# Patient Record
Sex: MALE | Race: OTHER | Employment: FULL TIME | URBAN - METROPOLITAN AREA
[De-identification: names, ages, dates, MRNs, and addresses within clinical notes are randomized per-mention and may not be internally consistent; named-entity substitution may affect disease eponyms.]

---

## 2017-01-06 ENCOUNTER — ANESTHESIA EVENT (OUTPATIENT)
Dept: SURGERY | Age: 57
DRG: 406 | End: 2017-01-06
Payer: COMMERCIAL

## 2017-01-06 ENCOUNTER — ANESTHESIA (OUTPATIENT)
Dept: SURGERY | Age: 57
DRG: 406 | End: 2017-01-06
Payer: COMMERCIAL

## 2017-01-06 ENCOUNTER — SURGERY (OUTPATIENT)
Age: 57
End: 2017-01-06

## 2017-01-06 PROBLEM — R16.0 LIVER MASS: Status: ACTIVE | Noted: 2017-01-06

## 2017-01-06 PROCEDURE — 74011250636 HC RX REV CODE- 250/636: Performed by: SURGERY

## 2017-01-06 PROCEDURE — 77030020061 HC IV BLD WRMR ADMIN SET 3M -B: Performed by: ANESTHESIOLOGY

## 2017-01-06 PROCEDURE — 77030008684 HC TU ET CUF COVD -B: Performed by: ANESTHESIOLOGY

## 2017-01-06 PROCEDURE — 77030008771 HC TU NG SALEM SUMP -A: Performed by: ANESTHESIOLOGY

## 2017-01-06 PROCEDURE — 77030013079 HC BLNKT BAIR HGGR 3M -A: Performed by: ANESTHESIOLOGY

## 2017-01-06 PROCEDURE — 74011250636 HC RX REV CODE- 250/636

## 2017-01-06 PROCEDURE — 77030026438 HC STYL ET INTUB CARD -A: Performed by: ANESTHESIOLOGY

## 2017-01-06 PROCEDURE — P9045 ALBUMIN (HUMAN), 5%, 250 ML: HCPCS

## 2017-01-06 PROCEDURE — 74011000250 HC RX REV CODE- 250

## 2017-01-06 PROCEDURE — 77030019908 HC STETH ESOPH SIMS -A: Performed by: ANESTHESIOLOGY

## 2017-01-06 RX ORDER — SODIUM CHLORIDE 9 MG/ML
INJECTION, SOLUTION INTRAVENOUS
Status: DISCONTINUED | OUTPATIENT
Start: 2017-01-06 | End: 2017-01-06 | Stop reason: HOSPADM

## 2017-01-06 RX ORDER — PHENYLEPHRINE HCL IN 0.9% NACL 0.4MG/10ML
SYRINGE (ML) INTRAVENOUS AS NEEDED
Status: DISCONTINUED | OUTPATIENT
Start: 2017-01-06 | End: 2017-01-06 | Stop reason: HOSPADM

## 2017-01-06 RX ORDER — GLYCOPYRROLATE 0.2 MG/ML
INJECTION INTRAMUSCULAR; INTRAVENOUS AS NEEDED
Status: DISCONTINUED | OUTPATIENT
Start: 2017-01-06 | End: 2017-01-06 | Stop reason: HOSPADM

## 2017-01-06 RX ORDER — BUPIVACAINE HYDROCHLORIDE 2.5 MG/ML
INJECTION, SOLUTION EPIDURAL; INFILTRATION; INTRACAUDAL AS NEEDED
Status: DISCONTINUED | OUTPATIENT
Start: 2017-01-06 | End: 2017-01-06 | Stop reason: HOSPADM

## 2017-01-06 RX ORDER — ONDANSETRON 2 MG/ML
INJECTION INTRAMUSCULAR; INTRAVENOUS AS NEEDED
Status: DISCONTINUED | OUTPATIENT
Start: 2017-01-06 | End: 2017-01-06 | Stop reason: HOSPADM

## 2017-01-06 RX ORDER — ROCURONIUM BROMIDE 10 MG/ML
INJECTION, SOLUTION INTRAVENOUS AS NEEDED
Status: DISCONTINUED | OUTPATIENT
Start: 2017-01-06 | End: 2017-01-06 | Stop reason: HOSPADM

## 2017-01-06 RX ORDER — SODIUM CHLORIDE, SODIUM LACTATE, POTASSIUM CHLORIDE, CALCIUM CHLORIDE 600; 310; 30; 20 MG/100ML; MG/100ML; MG/100ML; MG/100ML
INJECTION, SOLUTION INTRAVENOUS
Status: DISCONTINUED | OUTPATIENT
Start: 2017-01-06 | End: 2017-01-06 | Stop reason: HOSPADM

## 2017-01-06 RX ORDER — MIDAZOLAM HYDROCHLORIDE 1 MG/ML
INJECTION, SOLUTION INTRAMUSCULAR; INTRAVENOUS AS NEEDED
Status: DISCONTINUED | OUTPATIENT
Start: 2017-01-06 | End: 2017-01-06 | Stop reason: HOSPADM

## 2017-01-06 RX ORDER — MIDAZOLAM HYDROCHLORIDE 1 MG/ML
INJECTION, SOLUTION INTRAMUSCULAR; INTRAVENOUS AS NEEDED
Status: DISCONTINUED | OUTPATIENT
Start: 2017-01-06 | End: 2017-01-06

## 2017-01-06 RX ORDER — PROPOFOL 10 MG/ML
INJECTION, EMULSION INTRAVENOUS AS NEEDED
Status: DISCONTINUED | OUTPATIENT
Start: 2017-01-06 | End: 2017-01-06 | Stop reason: HOSPADM

## 2017-01-06 RX ORDER — LIDOCAINE HYDROCHLORIDE 20 MG/ML
INJECTION, SOLUTION EPIDURAL; INFILTRATION; INTRACAUDAL; PERINEURAL AS NEEDED
Status: DISCONTINUED | OUTPATIENT
Start: 2017-01-06 | End: 2017-01-06 | Stop reason: HOSPADM

## 2017-01-06 RX ORDER — FENTANYL CITRATE 50 UG/ML
INJECTION, SOLUTION INTRAMUSCULAR; INTRAVENOUS AS NEEDED
Status: DISCONTINUED | OUTPATIENT
Start: 2017-01-06 | End: 2017-01-06 | Stop reason: HOSPADM

## 2017-01-06 RX ORDER — NEOSTIGMINE METHYLSULFATE 1 MG/ML
INJECTION INTRAVENOUS AS NEEDED
Status: DISCONTINUED | OUTPATIENT
Start: 2017-01-06 | End: 2017-01-06 | Stop reason: HOSPADM

## 2017-01-06 RX ORDER — ALBUMIN HUMAN 50 G/1000ML
SOLUTION INTRAVENOUS AS NEEDED
Status: DISCONTINUED | OUTPATIENT
Start: 2017-01-06 | End: 2017-01-06 | Stop reason: HOSPADM

## 2017-01-06 RX ADMIN — FENTANYL CITRATE 100 MCG: 50 INJECTION, SOLUTION INTRAMUSCULAR; INTRAVENOUS at 08:56

## 2017-01-06 RX ADMIN — ROCURONIUM BROMIDE 45 MG: 10 INJECTION, SOLUTION INTRAVENOUS at 08:57

## 2017-01-06 RX ADMIN — ALBUMIN HUMAN 250 ML: 50 SOLUTION INTRAVENOUS at 09:39

## 2017-01-06 RX ADMIN — THROMBIN, TOPICAL (BOVINE) 20000 UNITS: KIT at 10:00

## 2017-01-06 RX ADMIN — BUPIVACAINE HYDROCHLORIDE 1.5 ML: 2.5 INJECTION, SOLUTION EPIDURAL; INFILTRATION; INTRACAUDAL at 10:33

## 2017-01-06 RX ADMIN — FENTANYL CITRATE 50 MCG: 50 INJECTION, SOLUTION INTRAMUSCULAR; INTRAVENOUS at 08:50

## 2017-01-06 RX ADMIN — MIDAZOLAM HYDROCHLORIDE 2 MG: 1 INJECTION, SOLUTION INTRAMUSCULAR; INTRAVENOUS at 08:50

## 2017-01-06 RX ADMIN — LIDOCAINE HYDROCHLORIDE 60 MG: 20 INJECTION, SOLUTION EPIDURAL; INFILTRATION; INTRACAUDAL; PERINEURAL at 08:56

## 2017-01-06 RX ADMIN — BUPIVACAINE HYDROCHLORIDE 2 ML: 2.5 INJECTION, SOLUTION EPIDURAL; INFILTRATION; INTRACAUDAL at 10:20

## 2017-01-06 RX ADMIN — SODIUM CHLORIDE, SODIUM LACTATE, POTASSIUM CHLORIDE, CALCIUM CHLORIDE: 600; 310; 30; 20 INJECTION, SOLUTION INTRAVENOUS at 08:47

## 2017-01-06 RX ADMIN — ONDANSETRON 4 MG: 2 INJECTION INTRAMUSCULAR; INTRAVENOUS at 10:15

## 2017-01-06 RX ADMIN — Medication 80 MCG: at 09:57

## 2017-01-06 RX ADMIN — ROCURONIUM BROMIDE 10 MG: 10 INJECTION, SOLUTION INTRAVENOUS at 09:50

## 2017-01-06 RX ADMIN — GELATIN ABSORBABLE SPONGE SIZE 100 1 EACH: MISC at 09:58

## 2017-01-06 RX ADMIN — CEFAZOLIN 2 G: 1 INJECTION, POWDER, FOR SOLUTION INTRAMUSCULAR; INTRAVENOUS; PARENTERAL at 09:11

## 2017-01-06 RX ADMIN — SODIUM CHLORIDE: 9 INJECTION, SOLUTION INTRAVENOUS at 09:18

## 2017-01-06 RX ADMIN — PROPOFOL 150 MG: 10 INJECTION, EMULSION INTRAVENOUS at 08:56

## 2017-01-06 RX ADMIN — ROCURONIUM BROMIDE 5 MG: 10 INJECTION, SOLUTION INTRAVENOUS at 08:56

## 2017-01-06 RX ADMIN — PROPOFOL 50 MG: 10 INJECTION, EMULSION INTRAVENOUS at 10:23

## 2017-01-06 RX ADMIN — NEOSTIGMINE METHYLSULFATE 4 MG: 1 INJECTION INTRAVENOUS at 10:30

## 2017-01-06 RX ADMIN — BUPIVACAINE HYDROCHLORIDE 2.5 ML: 2.5 INJECTION, SOLUTION EPIDURAL; INFILTRATION; INTRACAUDAL at 09:02

## 2017-01-06 RX ADMIN — GLYCOPYRROLATE 0.7 MG: 0.2 INJECTION INTRAMUSCULAR; INTRAVENOUS at 10:30

## 2017-01-06 NOTE — ANESTHESIA PROCEDURE NOTES
Epidural Block    Start time: 1/6/2017 8:37 AM  End time: 1/6/2017 8:48 AM  Performed by: BRAEDEN Bella  Authorized by: BRAEDEN Bella     Pre-Procedure  Indication: at surgeon's request, post-op pain management and procedure for pain    Preanesthetic Checklist: patient identified, risks and benefits discussed, anesthesia consent, site marked, patient being monitored, timeout performed and anesthesia consent    Timeout Time: 08:37        Epidural:   Patient position:  Left lateral decubitus  Prep region:  Lumbar  Prep: Chlorhexidine    Location:  T9-10    Needle and Epidural Catheter:   Needle Type:  Tuohy  Needle Gauge:  17 G  Injection Technique:  Loss of resistance using air  Attempts:  1  Catheter Size:  19 G  Events: no blood with aspiration, no cerebrospinal fluid with aspiration, no paresthesia and negative aspiration test    Test Dose:  Bupivacaine 0.25% and negative    Assessment:   Catheter Secured:  Tegaderm and tape  Insertion:  Uncomplicated  Patient tolerance:  Patient tolerated the procedure well with no immediate complications

## 2017-01-06 NOTE — ANESTHESIA PREPROCEDURE EVALUATION
Anesthetic History   No history of anesthetic complications            Review of Systems / Medical History  Patient summary reviewed, nursing notes reviewed and pertinent labs reviewed    Pulmonary  Within defined limits                 Neuro/Psych   Within defined limits           Cardiovascular  Within defined limits  Hypertension                   GI/Hepatic/Renal  Within defined limits         Liver disease     Endo/Other  Within defined limits      Arthritis     Other Findings              Physical Exam    Airway  Mallampati: II  TM Distance: > 6 cm  Neck ROM: normal range of motion   Mouth opening: Normal     Cardiovascular  Regular rate and rhythm,  S1 and S2 normal,  no murmur, click, rub, or gallop             Dental  No notable dental hx       Pulmonary  Breath sounds clear to auscultation               Abdominal  GI exam deferred       Other Findings            Anesthetic Plan    ASA: 2  Anesthesia type: general      Post-op pain plan if not by surgeon: indwelling epidural catheter    Induction: Intravenous  Anesthetic plan and risks discussed with: Patient

## 2017-01-10 NOTE — ANESTHESIA POSTPROCEDURE EVALUATION
Post-Anesthesia Evaluation and Assessment    Patient: Corrie Snider MRN: [id number]  SSN: [id number]    YOB: 1960  Age: 61 y.o. Sex: male       Cardiovascular Function/Vital Signs  Visit Vitals    /75 (BP 1 Location: Right arm, BP Patient Position: At rest)    Pulse 60    Temp 37.1 °C (98.7 °F)    Resp 18    Ht 5' 7\" (1.702 m)    Wt 64 kg (141 lb 1.5 oz)    SpO2 95%    BMI 22.1 kg/m2       Patient is status post general, epidural anesthesia for Procedure(s):  PARTIAL RIGHT HEPATIC LIVER LOBECTOMY. Nausea/Vomiting: None    Postoperative hydration reviewed and adequate. Pain:  Pain Scale 1: Numeric (0 - 10) (01/09/17 1154)  Pain Intensity 1: 0 (01/09/17 1154)   Managed    Neurological Status:   Neuro (WDL): Within Defined Limits (01/06/17 1120)   At baseline    Mental Status and Level of Consciousness: Arousable    Pulmonary Status:   O2 Device: Room air (01/09/17 1154)   Adequate oxygenation and airway patent    Complications related to anesthesia: None    Post-anesthesia assessment completed.  No concerns    Signed By: Tram Shaikh MD     January 10, 2017

## 2017-01-12 PROBLEM — C22.0 HEPATOMA (HCC): Status: ACTIVE | Noted: 2017-01-06

## 2017-01-16 ENCOUNTER — OFFICE VISIT (OUTPATIENT)
Dept: SURGERY | Age: 57
End: 2017-01-16

## 2017-01-16 VITALS
TEMPERATURE: 98.8 F | SYSTOLIC BLOOD PRESSURE: 138 MMHG | WEIGHT: 140 LBS | BODY MASS INDEX: 21.97 KG/M2 | HEIGHT: 67 IN | DIASTOLIC BLOOD PRESSURE: 78 MMHG | RESPIRATION RATE: 16 BRPM | HEART RATE: 58 BPM | OXYGEN SATURATION: 97 %

## 2017-01-16 DIAGNOSIS — Z09 POSTOPERATIVE EXAMINATION: Primary | ICD-10-CM

## 2017-01-16 NOTE — MR AVS SNAPSHOT
Visit Information Date & Time Provider Department Dept. Phone Encounter #  
 1/16/2017  1:50 PM [place]row Nikhil, [address] [facility] [phone] [id number] Upcoming Health Maintenance Date Due Pneumococcal 19-64 Highest Risk (1 of 3 - PCV13) 1/2/1978 DTaP/Tdap/Td series (1 - Tdap) 1/2/1980 FOBT Q 1 YEAR AGE 50-75 1/2/2009 INFLUENZA AGE 9 TO ADULT 8/1/2016 Allergies as of 1/16/2017  Review Complete On: 1/16/2017 By: Venancio Tejeda MD  
  
 Severity Noted Reaction Type Reactions Venom-honey Bee High 04/08/2014    Anaphylaxis Current Immunizations  Reviewed on 1/9/2017 No immunizations on file. Not reviewed this visit You Were Diagnosed With   
  
 Codes Comments Postoperative examination    -  Primary ICD-10-CM: F03 ICD-9-CM: V67.00 Vitals BP Pulse Temp Resp Height(growth percentile) Weight(growth percentile) 138/78 (BP 1 Location: Right arm, BP Patient Position: Sitting) (!) 58 98.8 °F (37.1 °C) (Oral) 16 5' 7\" (1.702 m) 140 lb (63.5 kg) SpO2 BMI Smoking Status 97% 21.93 kg/m2 Current Every Day Smoker BMI and BSA Data Body Mass Index Body Surface Area  
 21.93 kg/m 2 1.73 m 2 Preferred Pharmacy Pharmacy Name Phone [facility] [address] [place] [address], [address] [place] [address] Your Updated Medication List  
  
   
This list is accurate as of: 1/16/17  3:35 PM.  Always use your most recent med list.  
  
  
  
  
 oxyCODONE-acetaminophen 5-325 mg per tablet Commonly known as:  PERCOCET Take 1-2 Tabs by mouth every four (4) hours as needed. Max Daily Amount: 12 Tabs. polyethylene glycol 17 gram packet Commonly known as:  Beola [place]fford Take 1 Packet by mouth daily as needed. For constipation  
  
 tenofovir 300 mg tablet Commonly known as:  Shabbir Vaughan Take 1 Tab by mouth daily. Introducing [facility] HEALTH SERVICES! [facility] introduces avocadostore patient portal. Now you can access parts of your medical record, email your doctor's office, and request medication refills online. 1. In your internet browser, go to [email or web address] 2. Click on the First Time User? Click Here link in the Sign In box. You will see the New Member Sign Up page. 3. Enter your avocadostore Access Code exactly as it appears below. You will not need to use this code after youve completed the sign-up process. If you do not sign up before the expiration date, you must request a new code. · avocadostore Access Code: [email or web address] Expires: 1/23/2017 12:53 PM 
 
4. Enter the last four digits of your Social Security Number (xxxx) and Date of Birth (mm/dd/yyyy) as indicated and click Submit. You will be taken to the next sign-up page. 5. Create a avocadostore ID. This will be your avocadostore login ID and cannot be changed, so think of one that is secure and easy to remember. 6. Create a avoc[employer]ostore password. You can change your password at any time. 7. Enter your Password Reset Question and Answer. This can be used at a later time if you forget your password. 8. Enter your e-mail address. You will receive e-mail notification when new information is available in [address] 9. Click Sign Up. You can now view and download portions of your medical record. 10. Click the Download Summary menu link to download a portable copy of your medical information. If you have questions, please visit the Frequently Asked Questions section of the avocadostore website. Remember, avocadostore is NOT to be used for urgent needs. For medical emergencies, dial 911. Now available from your iPhone and Android! Please provide this summary of care documentation to your next provider. Your primary care clinician is listed as Latesha Sy. If you have any questions after today's visit, please call [phone].

## 2017-01-16 NOTE — PROGRESS NOTES
Surgery History and Physical    Subjective:      Jose Chen is a 61 y.o.  male who presents for postop care following a partial right hepatic lobectomy on 1/6/17. His pathology report revealed hepatocellular carcinoma, surgical margins are free of tumor, and adjacent liver parenchyma with chronic hepatitis, with bridging fibrosis,(Grade 2, Stage 3), consistent with Hepatitis C infection. He reports doing extremely well and notes walking 5 miles the day after surgery. However, he does mention his right outer thigh feels \"half numb\" and an electric sensation. Chief Complaint   Patient presents with    Surgical Follow-up     1-6-17 partial right hepatectomy       Patient Active Problem List    Diagnosis Date Noted    Hepatoma [facility]) 01/06/2017    Pulmonary nodule 06/10/2016    Chronic hepatitis B ([facility] [address] 04/22/2016    Liver mass, right lobe 04/22/2016     Past Medical History   Diagnosis Date    Arthritis     Hepatitis B carrier     Hepatoma ([facility] [address] 1/6/2017    Hypertension      PT DENIES    Liver disease      HEPATITIS B    Liver mass, right lobe 4/22/2016    Pulmonary nodule 06/2016     right middle lobe, recheck 11/16 stable at 2 mm, recheck again in 6 mo    Ulnar nerve compression      right      Past Surgical History   Procedure Laterality Date    Hx prostatectomy  1979    Hx heent  1997     JAW SURGERY-A. [place] Hx orthopaedic Right 2014     ELBOW    Hx other surgical  01/06/2017     partial right [id number]. Limmie Pears      Social History   Substance Use Topics    Smoking status: Current Every Day Smoker     Packs/day: 0.50     Years: 20.00     Types: Cigarettes    Smokeless tobacco: Never Used    Alcohol use No      Comment: quit 11/2016      Family History   Problem Relation Age of Onset    Anesth Problems Neg Hx       Prior to Admission medications    Medication Sig Start Date End Date Taking?  Authorizing Provider   tenofovir (VIREAD) 300 mg tablet Take 1 Tab by mouth daily. 6/28/16  Yes KEY Rudolph   oxyCODONE-acetaminophen (PERCOCET) 5-325 mg per tablet Take 1-2 Tabs by mouth every four (4) hours as needed. Max Daily Amount: 12 Tabs. 1/9/17   Dinorah Vivas NP   polyethylene glycol (MIRALAX) 17 gram packet Take 1 Packet by mouth daily as needed. For constipation 1/9/17   Dinorah Vivas NP     Allergies   Allergen Reactions    Venom-Honey Bee Anaphylaxis         Review of Systems:    A comprehensive review of systems was negative except for that written in the History of Present Illness. Objective:     Visit Vitals    /78 (BP 1 Location: Right arm, BP Patient Position: Sitting)    Pulse (!) 58    Temp 98.8 °F (37.1 °C) (Oral)    Resp 16    Ht 5' 7\" (1.702 m)    Wt 140 lb (63.5 kg)    SpO2 97%    BMI 21.93 kg/m2       Physical Exam:  General appearance: alert, cooperative, no distress, appears stated age  Head: Normocephalic, without obvious abnormality, atraumatic  Abdomen: soft, non-tender. Bowel sounds normal. No masses,  no organomegaly, incision healed well with no evidence of infection   Neurologic: Grossly normal      Assessment:       ICD-10-CM ICD-9-CM    1. Postoperative examination Z09 V67.00        Plan:     Mr. Altagracia Pedraza will be returning back to work tomorrow with the condition of light duty for a week. Follow up prn and call for questions. Will also see Dr. Michelle Acevedo    Written by landon Christian, as dictated by [facility]. Kishan Snowden MD.    Total face to face time with patient: 10 minutes. Greater than 50% of the time was spent in counseling. Signed By: Evangelina Snowden MD    January 16, 2017

## 2017-01-16 NOTE — PROGRESS NOTES
1. Have you been to the ER, urgent care clinic since your last visit? Hospitalized since your last visit? No    2. Have you seen or consulted any other health care providers outside of the [address] [place] [address] since your last visit? Include any pap smears or colon screening.  No

## 2017-02-03 ENCOUNTER — TELEPHONE (OUTPATIENT)
Dept: SURGERY | Age: 57
End: 2017-02-03

## 2017-02-03 NOTE — TELEPHONE ENCOUNTER
Patient called back and said he was still having the pain that he had at his last appt. No better, no worse. Appt.  Has been made for eval.

## 2017-02-03 NOTE — TELEPHONE ENCOUNTER
Patient is in a lot of pain and wants to know if this is normal or does he need to come in to see the doctor. I already scheduled him an appointment.

## 2017-02-10 ENCOUNTER — OFFICE VISIT (OUTPATIENT)
Dept: HEMATOLOGY | Age: 57
End: 2017-02-10

## 2017-02-10 VITALS
OXYGEN SATURATION: 97 % | HEART RATE: 58 BPM | RESPIRATION RATE: 18 BRPM | TEMPERATURE: 98.4 F | WEIGHT: 143.4 LBS | DIASTOLIC BLOOD PRESSURE: 75 MMHG | SYSTOLIC BLOOD PRESSURE: 143 MMHG | BODY MASS INDEX: 22.51 KG/M2 | HEIGHT: 67 IN

## 2017-02-10 DIAGNOSIS — B18.1 CHRONIC HEPATITIS B (HCC): Primary | ICD-10-CM

## 2017-02-10 RX ORDER — OXYCODONE AND ACETAMINOPHEN 5; 325 MG/1; MG/1
1-2 TABLET ORAL
Qty: 40 TAB | Refills: 0 | Status: SHIPPED | OUTPATIENT
Start: 2017-02-10 | End: 2018-03-21

## 2017-02-10 RX ORDER — TENOFOVIR DISOPROXIL FUMARATE 300 MG/1
300 TABLET, FILM COATED ORAL DAILY
Qty: 28 TAB | Refills: 11 | Status: SHIPPED | OUTPATIENT
Start: 2017-02-10 | End: 2018-01-29 | Stop reason: SDUPTHER

## 2017-02-10 NOTE — PROGRESS NOTES
93 Donna Everett MD, [address], [facility]     April Johann Arenas, MORALES Diego MD, [address], MD Evangelina Purcell NP Constancia Gravely, NP        [address] [facility], [address] [address]  [address].     [phone]     FAX: [address] [place] [address], [address] [place] [address], [address]     [phone]     FAX: [phone]     Patient Care Team:  Damaris Wagner MD as PCP - General (Family Practice)  Samira Haney MD (Cardiology)  Tunde Abbott MD (General Surgery)    Patient Active Problem List   Diagnosis Code    Chronic hepatitis B ([facility] [address] B18.1    Liver mass, right lobe R16.0    Pulmonary nodule R91.1    Hepatocellular carcinoma ([address] C22.0       Camden Mcdonald returns [facility] the [facility] for management of [address] due to chronic HBV. The active problem list, all pertinent past medical history, medications, radiologic findings and laboratory findings related to the liver disorder were reviewed with the patient. The patient is a 61 y.o.  male from [place] who was first noted to have abnormalities in liver transaminases in 3/2016. Serologic studies demonstrated he was positive for HBV and ASMA. His ALT and HBV DNA were elevated and Viread 300 mg daily was started (June 2016). He has tolerated this medication well with no new physical complaints and has shown significant suppression of HBV at last office visit. Ultrasound of the liver was performed in 3/2016. The results of the imaging demonstrated a normal appearing liver. There was a 1.8 cm lesion in the right lobe. CT scan of the liver was performed in 5/2016. This demonstrated the liver lesion was a hemangioma. A new pulmonary 2 cm nodule was observed.   Patient had routine interval US of liver in 11/2016 which demonstrated interval increase of size of lesion. CT scan of 11/2016 confirmed increase in size and characteristics of enhancement and washout concerning for malignancy. Given the patient has no underlying cirrhosis, resection of this lesion was thought to be best approach. He underwent resection of the segment 8 lesion in early 1/2017 and tolerated this surgery well. He was up and resuming normal activities within a week. Pathology confirmed moderately differentiated [address] with clear margins in the setting of bridging fibrosis. Patient has had some increased \"pulling sensation\" and pain inferior to the incision site, especially with deep breath or coughing. He has had to continue with pain medication on an intermittent basis at the end of a work day to manage pain in order to sleep. No associated fever, radiation of pain or association with bowel movement. A liver biopsy has not been performed. He has had past Fibroscan. The result was 11.8 kPa which correlates with fibrosis stage 3 out of 4. This suggests that the patient has bridging fibrosis. The patient has no symptoms which could be attributed to the liver disorder. The patient completes all daily activities without any functional limitations. The patient has not experienced fatigue, pain in the right side over the liver, problems concentrating, swelling of the abdomen, swelling of the lower extremities, hematemesis, hematochezia. ALLERGIES  Allergies   Allergen Reactions    Venom-Honey Bee Anaphylaxis       MEDICATIONS  Current Outpatient Prescriptions   Medication Sig    tenofovir (VIREAD) 300 mg tablet Take 1 Tab by mouth daily.  oxyCODONE-acetaminophen (PERCOCET) 5-325 mg per tablet Take 1-2 Tabs by mouth every four (4) hours as needed. Max Daily Amount: 12 Tabs.  polyethylene glycol (MIRALAX) 17 gram packet Take 1 Packet by mouth daily as needed. For constipation     No current facility-administered medications for this visit.         SYSTEM REVIEW NOT RELATED TO LIVER DISEASE OR REVIEWED ABOVE:  Constitution systems: Negative for fever, chills, weight gain, weight loss. Eyes: Negative for visual changes. ENT: Negative for sore throat, painful swallowing. Respiratory: Negative for cough, hemoptysis, SOB. Cardiology: Negative for chest pain, palpitations. GI:  Negative for constipation or diarrhea. Some sharp/pulling pain inferior to incision, persists ~1 month post-op. : Negative for urinary frequency, dysuria, hematuria, nocturia. Skin: Negative for rash. Diminished sensitivity of the skin overlying the right anterior thigh since surgery. Hematology: Negative for easy bruising, blood clots. Musculo-skeletal: Negative for back pain, muscle pain, weakness. Neurologic: Negative for headaches, dizziness, vertigo, memory problems not related to HE. Psychology: Negative for anxiety, depression. FAMILY HISTORY:  The father  of lung cancer. The patient has no knowledge of the mother's medical condition. There is no family history of liver disease. SOCIAL HISTORY:  The patient is . The patient has 5 children, and 3 grandchildren. The patient currently smokes half pack of tobacco daily. The patient has previously consumed alcohol in excess. He now consumes 1-2 alcoholic beverages daily and up to 4 on each weekend day. This represents a significant reduction for him. The patient currently works full time as a . PHYSICAL EXAMINATION:  Visit Vitals    /75 (BP 1 Location: Left arm, BP Patient Position: Sitting)    Pulse (!) 58    Temp 98.4 °F (36.9 °C) (Oral)    Resp 18    Ht 5' 7\" (1.702 m)    Wt 143 lb 6.4 oz (65 kg)    SpO2 97%    BMI 22.46 kg/m2     General: No acute distress. Eyes: Sclera anicteric. ENT: No oral lesions. Thyroid normal.  Nodes: No adenopathy. Skin: No spider angiomata. No jaundice. No palmar erythema. Respiratory: Lungs clear to auscultation.    Cardiovascular: Regular heart rate. No murmurs. No JVD. Abdomen: 9-10 cm RUQ incisional scar, well-approximated without keloid or hernia. No surrounding erythema. Tender with palpation along the inferior border of the liver, which is prominent. Small, reducible umbilical hernia. Spleen not palpable. No obvious ascites. Extremities: No edema. No muscle wasting. No gross arthritic changes. Neurologic: Alert and oriented. Cranial nerves grossly intact. No asterixis.     LABORATORY STUDIES:  [address] & Units 1/8/2017 1/7/2017 1/6/2017   WBC 4.1 - 11.1 K/uL 10.0 12.8 (H) 7.6   ANC 1.8 - 8.0 K/UL  10.4 (H) 5.5   HGB 12.1 - 17.0 g/dL 12.2 12.8 12.1    - 400 K/uL 107 (L) 117 (L) 124 (L)   INR 0.8 - 1.2      AST 15 - 37 U/L 188 (H) 282 (H)    ALT 12 - 78 U/L 130 (H) 126 (H)    Alk Phos 45 - 117 U/L 68 63    Bili, Total 0.2 - 1.0 MG/DL 0.9 1.1 (H)    Bili, Direct 0.00 - 0.40 mg/dL      Albumin 3.5 - 5.0 g/dL 3.0 (L) 2.9 (L)    BUN 6 - 20 MG/DL 9 9    Creat 0.70 - 1.30 MG/DL 0.93 0.86    Na 136 - 145 mmol/L 137 138    K 3.5 - 5.1 mmol/L 3.9 3.8    Cl 97 - 108 mmol/L 102 104    CO2 21 - 32 mmol/L 27 23    Glucose 65 - 100 mg/dL 105 (H) 140 (H)    Magnesium 1.6 - 2.4 mg/dL 2.0       [facility] Latest Ref Rng 10/25/2016 6/10/2016 4/22/2016   WBC 3.4 - 10.8 x10E3/uL   6.9   ANC 1.4 - 7.0 x10E3/uL   4.2   HGB 12.6 - 17.7 g/dL   15.6    - 379 x10E3/uL   190   INR 0.8 - 1.2   1.1   AST 0 - 40 IU/L 54 (H) 47 (H) 192 (H)   ALT 0 - 44 IU/L 35 30 196 (H)   Alk Phos 39 - 117 IU/L 65 91 119 (H)   Bili, Total 0.0 - 1.2 mg/dL 0.4 0.4 1.2   Bili, Direct 0.00 - 0.40 mg/dL 0.18 0.20 0.53 (H)   Albumin 3.5 - 5.5 g/dL 4.5 4.3 3.9   BUN 6 - 24 mg/dL  13 14   Creat 0.76 - 1.27 mg/dL  0.84 0.72 (L)   Na 134 - 144 mmol/L  144 142   K 3.5 - 5.2 mmol/L  4.9 4.5   Cl 97 - 108 mmol/L  106 104   CO2 18 - 29 mmol/L  23 25   Glucose 65 - 99 mg/dL  103 (H) 93   Virology Latest Ref Rng 10/25/2016 6/10/2016    HBV DNA IU/mL 60 52510    Additional lab values drawn at today's office visit are pending at the time of documentation. SEROLOGIES:  Serologies Latest Ref Rng 6/10/2016 4/22/2016   Hep A Ab, Total Negative  Positive (A)   Hep B Surface Ag Negative  Positive (A)   Hep B Core Ab, Total Negative  Positive (A)   Hep B Surface AB QL   Non Reactive   Hep C Ab 0.0 - 0.9 s/co ratio  0.1   Hep D AB Not Detected Not Detected    Ferritin 30 - 400 ng/mL  359   Iron % Saturation 15 - 55 %  76 (HH)   AKIN, IFA   Negative   ASMCA 0 - 19 Units  83 (H)   Alpha-1 antitrypsin level 90 - 200 mg/dL  176   6/2016. HIV Ab is negative. HBeAg is negative, HBeAb is positive. LIVER HISTOLOGY:  6/2016. FibroScan performed at [address] EkPa was 11.8. IQR/med 26%. The results suggested a fibrosis level of F3.  1/2017. Liver resection. 2.2 cm HCC, moderately differentiated with free margins. Adjacent liver shows chronic hepatitis with bridging fibrosis (grade 2, stage 3)    ENDOSCOPIC PROCEDURES:  Not available or performed    RADIOLOGY:  3/2016. Ultrasound of liver. Normal appearing liver. Single hyperecholic mass in the right lobe measuring 1.5 cm. Consistent with hemangioma. 5/2016. CT scan abdomen with and without IV contrast. 1.9 cm hemangioma in the right hepatic lobe. No additional liver lesion. 11/2016. Ultrasound of liver. Increase in size of the right lobe hepatic mass. CT scan or MRI recommended. 11/2016. CT scan abdomen. Interval enlargement of hypervascular segment 8 liverlesion now 2.5 x 1.8 x 3.1 cm. In the setting of hepatitis B, despite lack of CT evidence of cirrhosis, hepatocellular carcinoma is more likely than an unusual presentation of a benign lesion such as atypical hemangioma. OTHER TESTING:  Not available or performed    ASSESSMENT AND PLAN:  Chronic HBV with bridging fibrosis.      Liver transaminases are elevated recently due to resection, these had nearly normalized pre-operatively. Liver function is normal. The platelet count is normal.  Based upon laboratory studies and imaging the patient does not appear to have cirrhosis - recent surgical liver biopsy demonstrates bridging fibosis. Will monitor these values, tumor marker and HBV levels in response to recent surgical resection of the liver. HBV treatment. Currently on Viread 300 mg daily with excellent tolerance. He is responding well to this treatment with a significant decline is his HBV DNA to almost undetected levels at last assessment in 10/2016. He will continue this long term unless patient shows future evidence of HBV Surface Ag loss and gain of surface antibody    The patient has been shown to have a small 2mm pulmonary nodule on the CT scan in 11/2016. Will continue to monitor, this may require dedicated study if increase in size or change in characteristics. [facility] [address] Patient underwent successful resection of the 2.2 cm tumor of segment 8 of the liver ~5 weeks ago. He has done well post-operatively, but continues to have some pain with cough and deep inspiration inferior to surgical margin. Suspect this is adhesional pain, but will obtain CT scan to assess for other source. Patient would ordinarily have this done at the 3 month post-surgical time point, but I will obtain within the next several weeks given his symptoms. I have renewed patient's pain medication as well. The patient was directed to continue all current medications at the current dosages. There are no contraindications for the patient to take any medications that are necessary for treatment of other medical issues. The patient was advised to remain abstinent from alcohol as noted above. Vaccination for viral hepatitis A is not needed. The patient has serologic evidence of prior exposure or vaccination with immunity. All of the above issues were discussed with the patient. All questions were answered.   The patient expressed a clear understanding of the above. Follow-up [address] in 2 months following additional imaging studies.       Ana Ortiz PA-C  [facility] [place] [facility] [address] [place] [address], [address] [address]  [address].  [phone]

## 2017-02-10 NOTE — MR AVS SNAPSHOT
Visit Information Date & Time Provider Department Dept. Phone Encounter #  
 2/10/2017  8:30 AM [facility] [address] [place] [address] [id number] Follow-up Instructions Return in about 2 months (around 4/10/2017). Upcoming Health Maintenance Date Due Pneumococcal 19-64 Highest Risk (1 of 3 - PCV13) 1/2/1978 DTaP/Tdap/Td series (1 - Tdap) 1/2/1980 FOBT Q 1 YEAR AGE 50-75 1/2/2009 INFLUENZA AGE 9 TO ADULT 8/1/2016 Allergies as of 2/10/2017  Review Complete On: 2/10/2017 By: Cem Razo Severity Noted Reaction Type Reactions Venom-honey Bee High 04/08/2014    Anaphylaxis Current Immunizations  Reviewed on 1/9/2017 No immunizations on file. Not reviewed this visit You Were Diagnosed With   
  
 Codes Comments Chronic hepatitis B ([facility]    -  Primary ICD-10-CM: B18.1 ICD-9-CM: 070.32 Vitals BP Pulse Temp Resp Height(growth percentile) Weight(growth percentile) 143/75 (BP 1 Location: Left arm, BP Patient Position: Sitting) (!) 58 98.4 °F (36.9 °C) (Oral) 18 5' 7\" (1.702 m) 143 lb 6.4 oz (65 kg) SpO2 BMI Smoking Status 97% 22.46 kg/m2 Current Every Day Smoker BMI and BSA Data Body Mass Index Body Surface Area  
 22.46 kg/m 2 1.75 m 2 Preferred Pharmacy Pharmacy Name Phone [facility] [address] [place] [address] Your Updated Medication List  
  
   
This list is accurate as of: 2/10/17  8:58 AM.  Always use your most recent med list.  
  
  
  
  
 oxyCODONE-acetaminophen 5-325 mg per tablet Commonly known as:  PERCOCET Take 1-2 Tabs by mouth every four (4) hours as needed. Max Daily Amount: 12 Tabs. polyethylene glycol 17 gram packet Commonly known as:  Ring[place] Armor Take 1 Packet by mouth daily as needed. For constipation  
  
 tenofovir 300 mg tablet Commonly known as:  Vonna Papa Take 1 Tab by mouth daily. Prescriptions Printed Refills  
 oxyCODONE-acetaminophen (PERCOCET) 5-325 mg per tablet 0 Sig: Take 1-2 Tabs by mouth every four (4) hours as needed. Max Daily Amount: 12 Tabs. Class: Print Route: Oral  
  
Prescriptions Sent to Pharmacy Refills  
 tenofovir (VIREAD) 300 mg tablet 11 Sig: Take 1 Tab by mouth daily. Class: Normal  
 Pharmacy: [facility] [address] [place] [address] [address]  #: [phone] Route: Oral  
  
We Performed the Following AFP WITH AFP-L3% [[id number] Custom] CBC W/O DIFF [47229 CPT(R)] HEPATIC FUNCTION PANEL (6) [[id number] Custom] HEPATITIS B, QT, PCR [93769 CPT(R)] METABOLIC PANEL, BASIC [67443 CPT(R)] Follow-up Instructions Return in about 2 months (around 4/10/2017). To-Do List   
 03/06/2017 Imaging:  CT ABD W WO CONT Referral Information Referral ID Referred By Referred To  
  
 [id number] Erich Maciel B Not Available Visits Status Start Date End Date 1 New Request 2/10/17 2/10/18 If your referral has a status of pending review or denied, additional information will be sent to support the outcome of this decision. Introducing \[facility] [place] [facility]\"" & HEALTH SERVICES! Christal Cortez introduces [email or web address] patient portal. Now you can access parts of your medical record, email your doctor's office, and request medication refills online. 1. In your internet browser, go to [email or web address] 2. Click on the First Time User? Click Here link in the Sign In box. You will see the New Member Sign Up page. 3. Enter your [email or web address] Access Code exactly as it appears below. You will not need to use this code after youve completed the sign-up process. If you do not sign up before the expiration date, you must request a new code. · [email or web address] Access Code: [email or web address] Expires: 5/11/2017  8:58 AM 
 
4.  Enter the last four digits of your Social Security Number (xxxx) and Date of Birth (mm/dd/yyyy) as indicated and click Submit. You will be taken to the next sign-up page. 5. Create a 1bib ID. This will be your 1bib login ID and cannot be changed, so think of one that is secure and easy to remember. 6. Create a 1bib password. You can change your password at any time. 7. Enter your Password Reset Question and Answer. This can be used at a later time if you forget your password. 8. Enter your e-mail address. You will receive e-mail notification when new information is available in [address] 9. Click Sign Up. You can now view and download portions of your medical record. 10. Click the Download Summary menu link to download a portable copy of your medical information. If you have questions, please visit the Frequently Asked Questions section of the 1bib website. Remember, 1bib is NOT to be used for urgent needs. For medical emergencies, dial 911. Now available from your iPhone and Android! Please provide this summary of care documentation to your next provider. Your primary care clinician is listed as Latesha Sy. If you have any questions after today's visit, please call [phone].

## 2017-02-11 LAB
ALBUMIN SERPL-MCNC: 4 G/DL (ref 3.5–5.5)
ALP SERPL-CCNC: 84 IU/L (ref 39–117)
ALT SERPL-CCNC: 17 IU/L (ref 0–44)
AST SERPL-CCNC: 23 IU/L (ref 0–40)
BILIRUB DIRECT SERPL-MCNC: 0.11 MG/DL (ref 0–0.4)
BILIRUB SERPL-MCNC: 0.3 MG/DL (ref 0–1.2)
BUN SERPL-MCNC: 12 MG/DL (ref 6–24)
BUN/CREAT SERPL: 14 (ref 9–20)
CALCIUM SERPL-MCNC: 9 MG/DL (ref 8.7–10.2)
CHLORIDE SERPL-SCNC: 103 MMOL/L (ref 96–106)
CO2 SERPL-SCNC: 24 MMOL/L (ref 18–29)
CREAT SERPL-MCNC: 0.87 MG/DL (ref 0.76–1.27)
ERYTHROCYTE [DISTWIDTH] IN BLOOD BY AUTOMATED COUNT: 13.5 % (ref 12.3–15.4)
GLUCOSE SERPL-MCNC: 115 MG/DL (ref 65–99)
HBV DNA SERPL NAA+PROBE-ACNC: <10 IU/ML
HBV DNA SERPL NAA+PROBE-LOG IU: NORMAL LOG10IU/ML
HCT VFR BLD AUTO: 38.4 % (ref 37.5–51)
HGB BLD-MCNC: 13 G/DL (ref 12.6–17.7)
MCH RBC QN AUTO: 32.9 PG (ref 26.6–33)
MCHC RBC AUTO-ENTMCNC: 33.9 G/DL (ref 31.5–35.7)
MCV RBC AUTO: 97 FL (ref 79–97)
PLATELET # BLD AUTO: 176 X10E3/UL (ref 150–379)
POTASSIUM SERPL-SCNC: 3.9 MMOL/L (ref 3.5–5.2)
RBC # BLD AUTO: 3.95 X10E6/UL (ref 4.14–5.8)
REF LAB TEST REF RANGE: NORMAL
SODIUM SERPL-SCNC: 142 MMOL/L (ref 134–144)
WBC # BLD AUTO: 7.8 X10E3/UL (ref 3.4–10.8)

## 2017-02-13 LAB
AFP L3 MFR SERPL: NORMAL % (ref 0–9.9)
AFP SERPL-MCNC: 1.4 NG/ML (ref 0–8)

## 2017-03-06 ENCOUNTER — HOSPITAL ENCOUNTER (OUTPATIENT)
Dept: CT IMAGING | Age: 57
Discharge: HOME | End: 2017-03-06
Attending: PHYSICIAN ASSISTANT
Payer: COMMERCIAL

## 2017-03-06 DIAGNOSIS — B18.1 CHRONIC HEPATITIS B (HCC): ICD-10-CM

## 2017-03-06 PROCEDURE — 74170 CT ABD WO CNTRST FLWD CNTRST: CPT

## 2017-03-06 PROCEDURE — 74011636320 HC RX REV CODE- 636/320: Performed by: PHYSICIAN ASSISTANT

## 2017-03-06 RX ADMIN — IOPAMIDOL 100 ML: 755 INJECTION, SOLUTION INTRAVENOUS at 09:13

## 2017-03-07 ENCOUNTER — TELEPHONE (OUTPATIENT)
Dept: HEMATOLOGY | Age: 57
End: 2017-03-07

## 2017-03-07 NOTE — TELEPHONE ENCOUNTER
Patient called and stated that he would like a call back form the PA to discuss his CT done 3/6/2017 please contact the pt at the phone number listed.

## 2017-03-08 ENCOUNTER — PATIENT OUTREACH (OUTPATIENT)
Dept: HEMATOLOGY | Age: 57
End: 2017-03-08

## 2017-03-08 DIAGNOSIS — T88.8XXA FLUID COLLECTION AT SURGICAL SITE, INITIAL ENCOUNTER: Primary | ICD-10-CM

## 2017-03-08 DIAGNOSIS — Z90.49 H/O RESECTION OF LIVER: ICD-10-CM

## 2017-03-13 ENCOUNTER — HOSPITAL ENCOUNTER (OUTPATIENT)
Dept: CT IMAGING | Age: 57
Discharge: HOME | End: 2017-03-13
Attending: PHYSICIAN ASSISTANT
Payer: COMMERCIAL

## 2017-03-13 ENCOUNTER — HOSPITAL ENCOUNTER (OUTPATIENT)
Dept: GENERAL RADIOLOGY | Age: 57
Discharge: HOME | End: 2017-03-13
Attending: PHYSICIAN ASSISTANT
Payer: COMMERCIAL

## 2017-03-13 VITALS
BODY MASS INDEX: 23.3 KG/M2 | DIASTOLIC BLOOD PRESSURE: 78 MMHG | SYSTOLIC BLOOD PRESSURE: 135 MMHG | TEMPERATURE: 98.2 F | WEIGHT: 145 LBS | HEART RATE: 53 BPM | OXYGEN SATURATION: 100 % | RESPIRATION RATE: 12 BRPM | HEIGHT: 66 IN

## 2017-03-13 DIAGNOSIS — T88.8XXA FLUID COLLECTION AT SURGICAL SITE, INITIAL ENCOUNTER: ICD-10-CM

## 2017-03-13 DIAGNOSIS — Z90.49 H/O RESECTION OF LIVER: ICD-10-CM

## 2017-03-13 PROCEDURE — 87075 CULTR BACTERIA EXCEPT BLOOD: CPT | Performed by: PHYSICIAN ASSISTANT

## 2017-03-13 PROCEDURE — 87205 SMEAR GRAM STAIN: CPT | Performed by: PHYSICIAN ASSISTANT

## 2017-03-13 PROCEDURE — 77030018781

## 2017-03-13 PROCEDURE — 74011000250 HC RX REV CODE- 250: Performed by: PHYSICIAN ASSISTANT

## 2017-03-13 PROCEDURE — 10022 CT FINE NDL ASPIR W IMAGE: CPT

## 2017-03-13 PROCEDURE — 71010 XR CHEST SNGL V: CPT

## 2017-03-13 PROCEDURE — C1729 CATH, DRAINAGE: HCPCS

## 2017-03-13 PROCEDURE — 74011250636 HC RX REV CODE- 250/636: Performed by: RADIOLOGY

## 2017-03-13 RX ORDER — MIDAZOLAM HYDROCHLORIDE 1 MG/ML
1 INJECTION, SOLUTION INTRAMUSCULAR; INTRAVENOUS
Status: DISCONTINUED | OUTPATIENT
Start: 2017-03-13 | End: 2017-03-13 | Stop reason: ALTCHOICE

## 2017-03-13 RX ORDER — CEFAZOLIN SODIUM IN 0.9 % NACL 2 G/50 ML
2 INTRAVENOUS SOLUTION, PIGGYBACK (ML) INTRAVENOUS ONCE
Status: DISCONTINUED | OUTPATIENT
Start: 2017-03-13 | End: 2017-03-13

## 2017-03-13 RX ORDER — FENTANYL CITRATE 50 UG/ML
25 INJECTION, SOLUTION INTRAMUSCULAR; INTRAVENOUS
Status: DISCONTINUED | OUTPATIENT
Start: 2017-03-13 | End: 2017-03-13 | Stop reason: ALTCHOICE

## 2017-03-13 RX ORDER — SODIUM CHLORIDE 9 MG/ML
25 INJECTION, SOLUTION INTRAVENOUS ONCE
Status: COMPLETED | OUTPATIENT
Start: 2017-03-13 | End: 2017-03-13

## 2017-03-13 RX ADMIN — SODIUM BICARBONATE 1 ML: 0.2 INJECTION, SOLUTION INTRAVENOUS at 09:40

## 2017-03-13 RX ADMIN — FENTANYL CITRATE 25 MCG: 50 INJECTION, SOLUTION INTRAMUSCULAR; INTRAVENOUS at 09:40

## 2017-03-13 RX ADMIN — FENTANYL CITRATE 25 MCG: 50 INJECTION, SOLUTION INTRAMUSCULAR; INTRAVENOUS at 09:34

## 2017-03-13 RX ADMIN — SODIUM CHLORIDE 25 ML/HR: 900 INJECTION, SOLUTION INTRAVENOUS at 09:27

## 2017-03-13 RX ADMIN — MIDAZOLAM HYDROCHLORIDE 1 MG: 1 INJECTION, SOLUTION INTRAMUSCULAR; INTRAVENOUS at 09:32

## 2017-03-13 RX ADMIN — FENTANYL CITRATE 25 MCG: 50 INJECTION, SOLUTION INTRAMUSCULAR; INTRAVENOUS at 09:54

## 2017-03-13 RX ADMIN — MIDAZOLAM HYDROCHLORIDE 1 MG: 1 INJECTION, SOLUTION INTRAMUSCULAR; INTRAVENOUS at 09:40

## 2017-03-13 RX ADMIN — FENTANYL CITRATE 25 MCG: 50 INJECTION, SOLUTION INTRAMUSCULAR; INTRAVENOUS at 09:22

## 2017-03-13 NOTE — IP AVS SNAPSHOT
[address] [address] 
[phone] Patient: Jose Cabrera MRN: [id number] VWI:[birth year] You are allergic to the following Allergen Reactions Venom-Honey Bee Anaphylaxis Recent Documentation Height Weight BMI Smoking Status 1.676 m 65.8 kg 23.4 kg/m2 Current Every Day Smoker Emergency Contacts Name Discharge Info Relation Home Work Mobile 213 Andrey Merchant CAREGIVER [3] Child [2] [phone] Gill Whitney  Child [2] [phone] About your hospitalization You were admitted on:  March 13, 2017 You last received care in the:  [facility] RAD CT You were discharged on:  March 13, 2017 Unit phone number:  [phone] Why you were hospitalized Your primary diagnosis was:  Not on File Providers Seen During Your Hospitalizations Provider Role Specialty Primary office phone KEY Clement Attending Provider Physician Assistant [phone] Your Primary Care Physician (PCP) Primary Care Physician Office Phone [facility] [phone] Follow-up Information None Your Appointments Tuesday April 25, 2017  2:30 PM EDT [facility] [address] [place] [address] [facility] [address] [place] [address] [phone]  
[phone] Current Discharge Medication List  
  
ASK your doctor about these medications Dose & Instructions Dispensing Information Comments Morning Noon Evening Bedtime  
 oxyCODONE-acetaminophen 5-325 mg per tablet Commonly known as:  PERCOCET Your last dose was: Your next dose is: Other:  _________ Dose:  1-2 Tab Take 1-2 Tabs by mouth every four (4) hours as needed. Max Daily Amount: 12 Tabs. Quantity:  40 Tab Refills:  0 polyethylene glycol 17 gram packet Commonly known as:  Janny Smith Your last dose was: Your next dose is: Other:  _________ Dose:  17 g Take 1 Packet by mouth daily as needed. For constipation Quantity:  30 Packet Refills:  0  
     
   
   
   
  
 tenofovir 300 mg tablet Commonly known as:  Jocelyneelleantionette Canavan Your last dose was: Your next dose is: Other:  _________ Dose:  300 mg Take 1 Tab by mouth daily. Quantity:  28 Tab Refills:  11 Discharge Instructions Side effects of sedation medications and other medications used today have been reviewed. Notify us of nausea, itching, hives, dizziness, or anything else out of the ordinary. Should you experience any of these significant changes, please call [phone] between the hours of 7:30 am and 10 pm or [phone] after hours. After hours, ask the  to page the [address] [place] [address] Technologist, and describe the problem to the technologist.  
 
Sedation for a Medical Procedure: After Your Visit  Instructions. Sedatives are used to relax you for a procedure. You may or may not be awake during the procedure. Common side effects of sedation medications include:    
 
            Drowsiness, dizziness, euphoria, sleepiness or confusion. I 
            Unsteady gait. Loss of fine muscle control and delayed reaction time. Visual disturbances, difficulty focusing and blurred vision. Impaired memory recall. Follow-up care is a key component for your health and safety. Be sure to make and go to all medical appointments. Call your doctor if you are having problems. It's also a good idea to keep a list of your allergies, medicines with doses and test results on hand Home care following your sedation procedure:  
You may experience some of these side effects or you may not be aware of subtle changes in your behavior or reaction time. Because you received these medications, we are giving you the following instructions: Activity For your safety, you should not drive until the medicine wears off and you can think clearly and react easily. Do not drive for 24 hours. Rest when you feel tired. Getting enough sleep will help you recover. Diet You can eat your normal diet. If your stomach is upset, try bland, low-fat foods like plain rice, broiled chicken, toast, and yogurt. Drink plenty of fluids unless your doctor advised you to limit your fluids. Do not consume alcoholic beverages for 24 hours. Instructions Do not make important personal, business, or legal decisions for 24 hours. Move slowly and carefully, do not make sudden position changes. Be alert for dizziness or lightheadedness and move accordingly. Have a responsible person assist you. Do not drive for 24 hours. Do not operate equipment for 24 hours. [employer] mowers, power tools, kitchen appliances, etc.) Discharge medications: 
Resume prior to test medications as prescribed by your personal physician. Monitor the needle stick site on your right abdomen for signs of infection, redness, swelling, bleeding or drainage and fever. Call you physician immediately if you notice these symptoms. Rest today. Limit your activities until the soreness has resolved. There is a bandaid at the site. Keep this clean and dry. You may shower tomorrow. Call 911 any time you think you may need emergency care. For example:  
             Call if you passed out (lost consciousness). The medicine is not wearing off and you cannot think clearly. Watch closely for changes in your health, and be sure to contact your doctor if you have any problems or concerns. Where can you learn more? Go to DealExplorer.be Enter B602 in the search box to learn more about \"Sedation for a Medical Procedure: After Your Visit. \" Discharge Orders None General Information Please provide this summary of care documentation to your next provider. Patient Signature:  ____________________________________________________________ Date:  ____________________________________________________________  
  
[facility] Provider Signature:  ____________________________________________________________ Date:  ____________________________________________________________

## 2017-03-13 NOTE — DISCHARGE INSTRUCTIONS
Side effects of sedation medications and other medications used today have been reviewed. Notify us of nausea, itching, hives, dizziness, or anything else out of the ordinary. Should you experience any of these significant changes, please call [phone] between the hours of 7:30 am and 10 pm or [phone] after hours. After hours, ask the  to page the [address] Technologist, and describe the problem to the technologist.     Sedation for a Medical Procedure: After Your Visit  Instructions. Sedatives are used to relax you for a procedure. You may or may not be awake during the procedure. Common side effects of sedation medications include:                   Drowsiness, dizziness, euphoria, sleepiness or confusion. I              Unsteady gait. Loss of fine muscle control and delayed reaction time. Visual disturbances, difficulty focusing and blurred vision. Impaired memory recall. Follow-up care is a key component for your health and safety. Be sure to make and go to all medical appointments. Call your doctor if you are having problems. It's also a good idea to keep a list of your allergies, medicines with doses and test results on hand    Home care following your sedation procedure: You may experience some of these side effects or you may not be aware of subtle changes in your behavior or reaction time. Because you received these medications, we are giving you the following instructions: Activity   For your safety, you should not drive until the medicine wears off and you can think clearly and react easily. Do not drive for 24 hours. Rest when you feel tired. Getting enough sleep will help you recover. Diet    You can eat your normal diet. If your stomach is upset, try bland, low-fat foods like plain rice, broiled chicken, toast, and yogurt. Drink plenty of fluids unless your doctor advised you to limit your fluids.   Do not consume alcoholic beverages for 24 hours. Instructions  Do not make important personal, business, or legal decisions for 24 hours. Move slowly and carefully, do not make sudden position changes. Be alert for dizziness or lightheadedness and move accordingly. Have a responsible person assist you. Do not drive for 24 hours. Do not operate equipment for 24 hours. [employer] mowers, power tools, kitchen appliances, etc.)    Discharge medications:  Resume prior to test medications as prescribed by your personal physician. Monitor the needle stick site on your right abdomen for signs of infection, redness, swelling, bleeding or drainage and fever. Call you physician immediately if you notice these symptoms. Rest today. Limit your activities until the soreness has resolved. There is a bandaid at the site. Keep this clean and dry. You may shower tomorrow. Call 911 any time you think you may need emergency care. For example:                Call if you passed out (lost consciousness). The medicine is not wearing off and you cannot think clearly. Watch closely for changes in your health, and be sure to contact your doctor if you have any problems or concerns. Where can you learn more? Go to Medigus.be   Enter G817 in the search box to learn more about \"Sedation for a Medical Procedure: After Your Visit. \"

## 2017-03-13 NOTE — H&P
Radiology History and Physical    Patient: Naye Castillo 61 y.o. male     Referring Physician: Conchita Karimi. Chief Complaint: No chief complaint on file. History of Present Illness: Post liver resection fluid collection. History:    Past Medical History:   Diagnosis Date    Arthritis     Hepatitis B carrier     Hepatoma ([address] 1/6/2017    Hypertension     PT DENIES    Liver disease     HEPATITIS B    Liver mass, right lobe 4/22/2016    Pulmonary nodule 06/2016    right middle lobe, recheck 11/16 stable at 2 mm, recheck again in 6 mo    Ulnar nerve compression     right     Family History   Problem Relation Age of Onset    Anesth Problems Neg Hx      Social History     Social History    Marital status:      Spouse name: N/A    Number of children: N/A    Years of education: N/A     Occupational History    Not on file. Social History Main Topics    Smoking status: Current Every Day Smoker     Packs/day: 0.50     Years: 20.00     Types: Cigarettes    Smokeless tobacco: Never Used    Alcohol use No      Comment: quit 11/2016    Drug use: No    Sexual activity: Yes     Partners: Female     Other Topics Concern    Not on file     Social History Narrative       Allergies: Allergies   Allergen Reactions    Venom-Honey Bee Anaphylaxis       Current Medications:  Current Outpatient Prescriptions   Medication Sig    tenofovir (VIREAD) 300 mg tablet Take 1 Tab by mouth daily.  oxyCODONE-acetaminophen (PERCOCET) 5-325 mg per tablet Take 1-2 Tabs by mouth every four (4) hours as needed. Max Daily Amount: 12 Tabs.  polyethylene glycol (MIRALAX) 17 gram packet Take 1 Packet by mouth daily as needed.  For constipation     Current Facility-Administered Medications   Medication Dose Route Frequency    midazolam (VERSED) injection 1 mg  1 mg IntraVENous RAD PRN    fentaNYL citrate (PF) injection 25 mcg  25 mcg IntraVENous RAD PRN    ceFAZolin in 0.9% NS (ANCEF) IVPB soln 2 g  2 g IntraVENous ONCE    0.9% sodium chloride infusion  25 mL/hr IntraVENous ONCE        Physical Exam:  Blood pressure 157/69, pulse (!) 47, temperature 98.2 °F (36.8 °C), resp. rate 13, height 5' 6\" (1.676 m), weight 65.8 kg (145 lb), SpO2 98 %. GENERAL: alert, cooperative, no distress, appears stated age, LUNG: clear to auscultation bilaterally, HEART: regular rate and rhythm      Alerts:    Hospital Problems  Date Reviewed: 3/13/2017    None          Laboratory:    No results for input(s): HGB, HCT, WBC, PLT, INR, BUN, CREA, K, CRCLT, HGBEXT, HCTEXT, PLTEXT in the last 72 hours. No lab exists for component: PTT, PT, INREXT      Plan of Care/Planned Procedure:  Risks, benefits, and alternatives reviewed with patient and he agrees to proceed with the procedure. Full dictated report to follow.

## 2017-03-13 NOTE — IP AVS SNAPSHOT
Current Discharge Medication List  
  
ASK your doctor about these medications Dose & Instructions Dispensing Information Comments Morning Noon Evening Bedtime  
 oxyCODONE-acetaminophen 5-325 mg per tablet Commonly known as:  PERCOCET Your next dose is: Today Tomorrow Comments:  __________ Dose:  1-2 Tab Take 1-2 Tabs by mouth every four (4) hours as needed. Max Daily Amount: 12 Tabs. Quantity:  40 Tab Refills:  0  
     
   
   
   
  
 polyethylene glycol 17 gram packet Commonly known as:  Kian Krishna Your next dose is: Today Tomorrow Comments:  __________ Dose:  17 g Take 1 Packet by mouth daily as needed. For constipation Quantity:  30 Packet Refills:  0  
     
   
   
   
  
 tenofovir 300 mg tablet Commonly known as:  Shantel Loveless Your next dose is: Today Tomorrow Comments:  __________ Dose:  300 mg Take 1 Tab by mouth daily. Quantity:  28 Tab Refills:  11

## 2017-03-13 NOTE — PROGRESS NOTES
Rcvd. In angio holding prior to abdominal (liver) fluid collection drainage. Assessment as noted. Son at bedside and speaks [place] and [place]. Patient speaks both also. States he understands English better than he can speak it. Both deny the need for . Dr. Shannon Reynoso in to evaluate patient and obtain consent for procedure. Patient and son verbalize understanding of procedure and consent signed by patient. Drainage completed in CT. Approx 1ml red fluid obtained for cultures which were taken to lab. Returned to angio holding. Taking po fluids. Denies any discomfort. Bandaid at site. Discharge instructions reviewed with patient and son. Both verbalize understanding of same. Copy given to patient. Post procedure chest x-ray ordered by Dr. Shannon Reynoso completed immediately following procedure and results reviewed. Saline lock removed with cath tip intact. Able to dress self. To auto via wheelchair.

## 2017-03-14 ENCOUNTER — TELEPHONE (OUTPATIENT)
Dept: SURGERY | Age: 57
End: 2017-03-14

## 2017-03-14 ENCOUNTER — PATIENT OUTREACH (OUTPATIENT)
Dept: HEMATOLOGY | Age: 57
End: 2017-03-14

## 2017-03-14 NOTE — PROGRESS NOTES
Spoke to patient's son, Janae Akers, regarding results of CT aspiration yesterday and plan to f/u with Dr. Luis Thompson per Tree Goode PA-C. Notified of appointment scheduled on 3/27 at 1:20 pm. Janae Akers expressed frustration regarding inability to drain fluid collection and another appointment with Dr. Luis Thompson. Explained to Janae Akers that the CT indicated a fluid collection was present although results from aspiration indicate hematoma. Further explained that this is a surgical complication which is why f/u with Dr. Luis Thompson is necessary. Janae Akers asked if Dr. Luis Thompson could call him to discuss so he doesn't \"take another day off of work for nothing. \" Encouraged Gautamkiran Hausersulaiman to contact Dr. Osmany Estrada office and discuss.

## 2017-03-14 NOTE — TELEPHONE ENCOUNTER
Had ct guided aspiration which showed what looked to be congealed blood. Cultures negative so far. Still has a lot of pain, and might even be a litle worse. Suggested a heating pad and pain pills for now. They will call in several weeks to let me know how he is doing.  Will call sooner if any problems

## 2017-03-16 ENCOUNTER — TELEPHONE (OUTPATIENT)
Dept: SURGERY | Age: 57
End: 2017-03-16

## 2017-03-16 PROBLEM — S36.112A LIVER HEMATOMA: Status: ACTIVE | Noted: 2017-03-16

## 2017-03-16 LAB
BACTERIA SPEC CULT: ABNORMAL
SERVICE CMNT-IMP: ABNORMAL

## 2017-03-16 RX ORDER — AMOXICILLIN AND CLAVULANATE POTASSIUM 875; 125 MG/1; MG/1
1 TABLET, FILM COATED ORAL EVERY 12 HOURS
Qty: 28 TAB | Refills: 0 | Status: SHIPPED | OUTPATIENT
Start: 2017-03-16 | End: 2017-03-30

## 2017-03-16 NOTE — TELEPHONE ENCOUNTER
Left message with son and also asked him to call me. Cultures are growing an anaerobic diphtheroid. Will treat with amoxacillin.

## 2017-03-17 LAB
BACTERIA SPEC CULT: NORMAL
GRAM STN SPEC: NORMAL
GRAM STN SPEC: NORMAL
SERVICE CMNT-IMP: NORMAL

## 2017-03-28 ENCOUNTER — TELEPHONE (OUTPATIENT)
Dept: SURGERY | Age: 57
End: 2017-03-28

## 2017-03-28 NOTE — TELEPHONE ENCOUNTER
Budd Bourdon called back and stated patient has two days left of the two week antibiotics. He states patient is no better, still in pain 'around the liver surgery site' pain level 7, and no real change since the antibiotics. I will route this to Dr. Siri Vargas for him to call and discuss further.

## 2017-03-29 ENCOUNTER — TELEPHONE (OUTPATIENT)
Dept: SURGERY | Age: 57
End: 2017-03-29

## 2017-03-29 NOTE — TELEPHONE ENCOUNTER
Spoke with son Aishwarya Rosario who tells me his father's pain is no better. I told him I would suggest another CT to see if the hematoma in the operative site is bigger, suggesting a drainage might be beneficial.  He will speak with this dad and let me know.

## 2017-04-10 ENCOUNTER — TELEPHONE (OUTPATIENT)
Dept: SURGERY | Age: 57
End: 2017-04-10

## 2017-04-10 DIAGNOSIS — Z85.05: Primary | ICD-10-CM

## 2017-04-11 NOTE — TELEPHONE ENCOUNTER
Spoke with son Aishwarya Rosario. Father is still having pain, sometimes worse than others. No fevers, but is losing weight. I think another scan is in order as blanc he and his father. Maybe the old blood has liquefied to the point it could be aspirated or drained,\. Will arrnge CT of the liver.

## 2017-04-17 ENCOUNTER — TELEPHONE (OUTPATIENT)
Dept: SURGERY | Age: 57
End: 2017-04-17

## 2017-04-17 NOTE — TELEPHONE ENCOUNTER
Dr. Luma Balderas spoke with Dr. Shadi Tineo with [employer] peer to peer for CT. Dr. Luma Balderas got [facility] #[phone].

## 2017-04-18 ENCOUNTER — HOSPITAL ENCOUNTER (OUTPATIENT)
Dept: CT IMAGING | Age: 57
Discharge: HOME | End: 2017-04-18
Attending: SURGERY
Payer: COMMERCIAL

## 2017-04-18 DIAGNOSIS — Z85.05: ICD-10-CM

## 2017-04-18 PROCEDURE — 74170 CT ABD WO CNTRST FLWD CNTRST: CPT

## 2017-04-18 PROCEDURE — 74011636320 HC RX REV CODE- 636/320: Performed by: SURGERY

## 2017-04-18 RX ADMIN — IOPAMIDOL 100 ML: 755 INJECTION, SOLUTION INTRAVENOUS at 08:24

## 2017-04-19 ENCOUNTER — TELEPHONE (OUTPATIENT)
Dept: SURGERY | Age: 57
End: 2017-04-19

## 2017-04-21 NOTE — PROGRESS NOTES
Patient still having pain, although fluid collection resolved. Pt to see Dr Carolyn Grande again next week per son.

## 2017-04-21 NOTE — TELEPHONE ENCOUNTER
Told son the fluid collection is gone.   Will see  Andrei Corinnes in the office to see if there is any other explanation for his abdominal pain

## 2017-05-02 ENCOUNTER — TELEPHONE (OUTPATIENT)
Dept: SURGERY | Age: 57
End: 2017-05-02

## 2017-05-02 ENCOUNTER — OFFICE VISIT (OUTPATIENT)
Dept: HEMATOLOGY | Age: 57
End: 2017-05-02

## 2017-05-02 VITALS
WEIGHT: 142.2 LBS | SYSTOLIC BLOOD PRESSURE: 158 MMHG | BODY MASS INDEX: 22.95 KG/M2 | OXYGEN SATURATION: 97 % | HEART RATE: 51 BPM | TEMPERATURE: 98.7 F | DIASTOLIC BLOOD PRESSURE: 78 MMHG

## 2017-05-02 DIAGNOSIS — C22.0 HEPATOCELLULAR CARCINOMA (HCC): Primary | ICD-10-CM

## 2017-05-02 DIAGNOSIS — B18.1 CHRONIC HEPATITIS B (HCC): ICD-10-CM

## 2017-05-02 NOTE — MR AVS SNAPSHOT
Visit Information Date & Time Provider Department Dept. Phone Encounter #  
 5/2/2017  2:30 PM Donal [facility] [id number] Your Appointments 8/14/2017  2:30 PM  
Follow Up with KEY Cabrera [facility] ([address]) Appt Note: follow up [address] [facility] [phone] [facility] [address]  
59 [facility] [address] Upcoming Health Maintenance Date Due Pneumococcal 19-64 Highest Risk (1 of 3 - PCV13) 1/2/1978 DTaP/Tdap/Td series (1 - Tdap) 1/2/1980 FOBT Q 1 YEAR AGE 50-75 1/2/2009 INFLUENZA AGE 9 TO ADULT 8/1/2017 Allergies as of 5/2/2017  Review Complete On: 5/2/2017 By: Za Steele Severity Noted Reaction Type Reactions Venom-honey Bee High 04/08/2014    Anaphylaxis Current Immunizations  Reviewed on 1/9/2017 No immunizations on file. Not reviewed this visit You Were Diagnosed With   
  
 Codes Comments Hepatocellular carcinoma ([facility] [address]    -  Primary ICD-10-CM: C22.0 ICD-9-CM: 155.0 Chronic hepatitis B ([facility] [address]     ICD-10-CM: B18.1 ICD-9-CM: 070.32 Vitals BP Pulse Temp Weight(growth percentile) SpO2 BMI  
 158/78 (!) 51 98.7 °F (37.1 °C) (Tympanic) 142 lb 3.2 oz (64.5 kg) 97% 22.95 kg/m2 Smoking Status Current Every Day Smoker BMI and BSA Data Body Mass Index Body Surface Area  
 22.95 kg/m 2 1.73 m 2 Preferred Pharmacy Pharmacy Name Phone [facility] [address] [place], [address] [facility] [address] Your Updated Medication List  
  
   
This list is accurate as of: 5/2/17  2:55 PM.  Always use your most recent med list.  
  
  
  
  
 oxyCODONE-acetaminophen 5-325 mg per tablet Commonly known as:  PERCOCET Take 1-2 Tabs by mouth every four (4) hours as needed. Max Daily Amount: 12 Tabs. polyethylene glycol 17 gram packet Commonly known as:  Poli Collins Take 1 Packet by mouth daily as needed. For constipation  
  
 tenofovir 300 mg tablet Commonly known as:  Demario Morales Take 1 Tab by mouth daily. We Performed the Following AFP WITH AFP-L3% [[id number] Custom] CBC W/O DIFF [52395 CPT(R)] HEP B SURFACE AB [id number] CPT(R)] HEP B SURFACE AG [id number] CPT(R)] HEPATIC FUNCTION PANEL (6) [[id number] Custom] HEPATITIS B, QT, PCR [36095 CPT(R)] METABOLIC PANEL, BASIC [19839 CPT(R)] To-Do List   
 08/02/2017 Imaging:  CT ABD W WO CONT Referral Information Referral ID Referred By Referred To  
  
 [id number] Danilo Bryce RAMIREZ Not Available Visits Status Start Date End Date 1 New Request 5/2/17 5/2/18 If your referral has a status of pending review or denied, additional information will be sent to support the outcome of this decision. Introducing [facility] & HEALTH SERVICES! [facility] introduces [employer] patient portal. Now you can access parts of your medical record, email your doctor's office, and request medication refills online. 1. In your internet browser, go to [email or web address] 2. Click on the First Time User? Click Here link in the Sign In box. You will see the New Member Sign Up page. 3. Enter your 12Society Access Code exactly as it appears below. You will not need to use this code after youve completed the sign-up process. If you do not sign up before the expiration date, you must request a new code. · 12Society Access Code: [email or web address] Expires: 5/11/2017  9:58 AM 
 
4. Enter the last four digits of your Social Security Number (xxxx) and Date of Birth (mm/dd/yyyy) as indicated and click Submit. You will be taken to the next sign-up page. 5. Create a 12Society ID. This will be your 12Society login ID and cannot be changed, so think of one that is secure and easy to remember. 6. Create a ServiceMax password. You can change your password at any time. 7. Enter your Password Reset Question and Answer. This can be used at a later time if you forget your password. 8. Enter your e-mail address. You will receive e-mail notification when new information is available in [address] 9. Click Sign Up. You can now view and download portions of your medical record. 10. Click the Download Summary menu link to download a portable copy of your medical information. If you have questions, please visit the Frequently Asked Questions section of the ServiceMax website. Remember, ServiceMax is NOT to be used for urgent needs. For medical emergencies, dial 911. Now available from your iPhone and Android! Please provide this summary of care documentation to your next provider. Your primary care clinician is listed as Latesha Sy. If you have any questions after today's visit, please call [phone].

## 2017-05-02 NOTE — TELEPHONE ENCOUNTER
Herminia Ledesma called about the patient's condition of surgical complications and was trying to reach Dr. Rajesh Montero.

## 2017-05-02 NOTE — TELEPHONE ENCOUNTER
Spoke with MAICOL Noyola on hospital floor. Stated patient and son wanted to speak to Dr. Laura Kirk related to plan of care. Patient continues to have increase pain. Nurse called Dr. Laura Kirk left message on patient for him to contact.

## 2017-05-02 NOTE — PROGRESS NOTES
93 Donna Everett MD, MAICOL Olson PA-C Ramona Chihuahua, MD, [address], MD Evangelina Kilgore NP Cathaleen Borer, NP        [address] [facility], [address] [address]     [phone]     FAX: [address] [place] [address], [address] [facility], [address]     [phone]     FAX: [phone]     Patient Care Team:  Viviana Smiley MD as PCP - General (Family Practice)  Pamala Bumpers, MD (Cardiology)  Marifer Monzon MD (General Surgery)  Dearbijan Gonzalez RN as Nurse Navigator    Patient Active Problem List   Diagnosis Code    Chronic hepatitis B ([facility] [address] B18.1    Liver mass, right lobe R16.0    Pulmonary nodule R91.1    Hepatocellular carcinoma ([address] C22.0    Liver hematoma, infected S36.112A       Simin Ralph returns to the [address] for management of [address] due to chronic HBV. The active problem list, all pertinent past medical history, medications, radiologic findings and laboratory findings related to the liver disorder were reviewed with the patient. The patient is a 61 y.o.  male from [place] who was first noted to have abnormalities in liver transaminases in 3/2016. Serologic studies demonstrated he was positive for HBV and ASMA. His ALT and HBV DNA were elevated and Viread 300 mg daily was started (June 2016). He has tolerated this medication well without new physical complaints and has shown significant suppression of HBV at last office visit. Ultrasound of the liver was performed in 3/2016. The results of the imaging demonstrated a normal appearing liver. There was a 1.8 cm lesion in the right lobe. CT scan of the liver was performed in 5/2016. This demonstrated the liver lesion was a hemangioma. A new pulmonary 2 cm nodule was observed.   Patient had routine interval US of liver in 11/2016 which demonstrated interval increase of size of lesion. CT scan of 11/2016 confirmed increase in size and characteristics of enhancement and washout concerning for malignancy. Given the patient has no underlying cirrhosis, resection of this lesion was thought to be best approach. He underwent resection of the segment 8 lesion in early 1/2017 and tolerated this surgery well. He was up and resuming normal activities within a week. Pathology confirmed moderately differentiated [address] with clear margins in the setting of bridging fibrosis. Patient has continued to have \"pulling sensation\" and pain inferior to the incision site, especially with deep breath/coughing and in particular at rest.  He has had to continue with pain medication on a daily basis in order to sleep. No associated fever, radiation of pain or association with bowel movement. Repeat imaging had shown a small fluid collection at the site of reported pain and an aspiration of this fluid had revealed anaerobic bacteria. Patient was treated by Dr Nikhil Zapata with a 2 week course of antibiotic and reports essentially no change in his pain symptoms. He has had recent repeat imaging with CT scan showing no evidence of recurrent liver lesion and resolution of the fluid collection. Patient is voicing frustration at the duration of the pain symptoms and remains concerned something else is going on to cause the pain. Despite ongoing abdominal pain, the patient completes all daily activities without any functional limitations. The patient has not experienced fatigue, problems concentrating, swelling of the abdomen, swelling of the lower extremities, hematemesis, hematochezia. ALLERGIES  Allergies   Allergen Reactions    Venom-Honey Bee Anaphylaxis       MEDICATIONS  Current Outpatient Prescriptions   Medication Sig    tenofovir (VIREAD) 300 mg tablet Take 1 Tab by mouth daily.     oxyCODONE-acetaminophen (PERCOCET) 5-325 mg per tablet Take 1-2 Tabs by mouth every four (4) hours as needed. Max Daily Amount: 12 Tabs.  polyethylene glycol (MIRALAX) 17 gram packet Take 1 Packet by mouth daily as needed. For constipation     No current facility-administered medications for this visit. SYSTEM REVIEW NOT RELATED TO LIVER DISEASE OR REVIEWED ABOVE:  Constitution systems: Negative for fever, chills, weight gain, weight loss. Eyes: Negative for visual changes. ENT: Negative for sore throat, painful swallowing. Respiratory: Negative for cough, hemoptysis, SOB. Cardiology: Negative for chest pain, palpitations. GI:  Negative for constipation or diarrhea. Some sharp/pulling pain inferior to incision, persists ~3 month post-op. : Negative for urinary frequency, dysuria, hematuria, nocturia. Skin: Negative for rash. Diminished sensitivity of the skin overlying the right anterior thigh since surgery. Hematology: Negative for easy bruising, blood clots. Musculo-skeletal: Negative for back pain, muscle pain, weakness. Neurologic: Negative for headaches, dizziness, vertigo, memory problems not related to HE. Psychology: Negative for anxiety, depression. FAMILY HISTORY:  The father  of lung cancer. The patient has no knowledge of the mother's medical condition. There is no family history of liver disease. SOCIAL HISTORY:  The patient is . The patient has 5 children, and 3 grandchildren. The patient currently smokes half pack of tobacco daily. The patient has previously consumed alcohol in excess. He now consumes 1-2 alcoholic beverages daily and up to 4 on each weekend day. This represents a significant reduction for him. The patient currently works full time as a .       PHYSICAL EXAMINATION:  Visit Vitals    /78    Pulse (!) 51    Temp 98.7 °F (37.1 °C) (Tympanic)    Wt 142 lb 3.2 oz (64.5 kg)    SpO2 97%    BMI 22.95 kg/m2     General: No acute distress. Eyes: Sclera anicteric. ENT: No oral lesions. Thyroid normal.  Nodes: No adenopathy. Skin: No spider angiomata. No jaundice. No palmar erythema. Respiratory: Lungs clear to auscultation. Cardiovascular: Regular heart rate. No murmurs. No JVD. Abdomen: 9-10 cm RUQ incisional scar, well-approximated without keloid or hernia. No surrounding erythema. Tender with palpation along the inferior border of the liver, which is prominent. Small, reducible umbilical hernia. Spleen not palpable. No obvious ascites. Extremities: No edema. No muscle wasting. No gross arthritic changes. Neurologic: Alert and oriented. Cranial nerves grossly intact. No asterixis. LABORATORY STUDIES:  Liver [facility] Ref Rng & Units 2/10/2017 1/8/2017 1/7/2017   WBC 3.4 - 10.8 x10E3/uL 7.8 10.0 12.8 (H)   ANC 1.8 - 8.0 K/UL   10.4 (H)   HGB 12.6 - 17.7 g/dL 13.0 12.2 12.8    - 379 x10E3/uL 176 107 (L) 117 (L)   INR 0.8 - 1.2      AST 0 - 40 IU/L 23 188 (H) 282 (H)   ALT 0 - 44 IU/L 17 130 (H) 126 (H)   Alk Phos 39 - 117 IU/L 84 68 63   Bili, Total 0.0 - 1.2 mg/dL 0.3 0.9 1.1 (H)   Bili, Direct 0.00 - 0.40 mg/dL 0.11     Albumin 3.5 - 5.5 g/dL 4.0 3.0 (L) 2.9 (L)   BUN 6 - 24 mg/dL 12 9 9   Creat 0.76 - 1.27 mg/dL 0.87 0.93 0.86   Na 134 - 144 mmol/L 142 137 138   K 3.5 - 5.2 mmol/L 3.9 3.9 3.8   Cl 96 - 106 mmol/L 103 102 104   CO2 18 - 29 mmol/L 24 27 23   Glucose 65 - 99 mg/dL 115 (H) 105 (H) 140 (H)   Magnesium 1.6 - 2.4 mg/dL  2.0      Virology Latest Ref Rng & Units 2/10/2017   HBV DNA IU/mL <10   Additional lab values drawn at today's office visit are pending at the time of documentation.     SEROLOGIES:  Serologies Latest Ref Rng 6/10/2016 4/22/2016   Hep A Ab, Total Negative  Positive (A)   Hep B Surface Ag Negative  Positive (A)   Hep B Core Ab, Total Negative  Positive (A)   Hep B Surface AB QL   Non Reactive   Hep C Ab 0.0 - 0.9 s/co ratio  0.1   Hep D AB Not Detected Not Detected    Ferritin 30 - 400 ng/mL  359   Iron % Saturation 15 - 55 %  76 (HH)   AKIN, IFA   Negative   ASMCA 0 - 19 Units  83 (H)   Alpha-1 antitrypsin level 90 - 200 mg/dL  176   6/2016. HIV Ab is negative. HBeAg is negative, HBeAb is positive. LIVER HISTOLOGY:  6/2016. FibroScan performed at [address] EkPa was 11.8. IQR/med 26%. The results suggested a fibrosis level of F3.  1/2017. Liver resection. 2.2 cm HCC, moderately differentiated with free margins. Adjacent liver shows chronic hepatitis with bridging fibrosis (grade 2, stage 3)    ENDOSCOPIC PROCEDURES:  Not available or performed    RADIOLOGY:  3/2016. Ultrasound of liver. Normal appearing liver. Single hyperecholic mass in the right lobe measuring 1.5 cm. Consistent with hemangioma. 5/2016. CT scan abdomen with and without IV contrast. 1.9 cm hemangioma in the right hepatic lobe. No additional liver lesion. 11/2016. Ultrasound of liver. Increase in size of the right lobe hepatic mass. CT scan or MRI recommended. 11/2016. CT scan abdomen. Interval enlargement of hypervascular segment 8 liverlesion now 2.5 x 1.8 x 3.1 cm. In the setting of hepatitis B, despite lack of CT evidence of cirrhosis, hepatocellular carcinoma is more likely than an unusual presentation of a benign lesion such as atypical hemangioma. 3/2017. CT of abdomen. No residual enhancing tumor  Gas within the right lobe resection site is unexpected 2 months postoperatively. Surgical packing material, abscess, and less likely retained postoperative gas, are possible. 4/2017. CT abdomen. No CT evidence of locally recurrent hepatocellular carcinoma, metastatic disease, or new lesions within cirrhotic appearing liver. Resolution of fluid collection. OTHER TESTING:  Not available or performed    ASSESSMENT AND PLAN:  Chronic HBV with bridging fibrosis. Liver transaminases normalized following resection for [address]  No evidence of local recurrence at the site of resection on 4/2017 imaging. Will plan to continue with imaging studies every 3 months through one year post-resection. Continued post-operative pain. Apparent resolution of fluid collection. I have contacted Dr Peggy Pablo office for additional direction. Exam is positive for point tenderness at the inferior margin of the incision. Patient is currently continuing on Percocet on a daily basis. Based upon laboratory studies and imaging the patient does not appear to have cirrhosis - recent surgical liver biopsy demonstrates bridging fibosis. Will monitor these values, tumor marker and HBV levels in response to recent surgical resection of the liver. HBV treatment. Currently on Viread 300 mg daily with excellent tolerance. He is responding well to this treatment with a significant decline is his HBV DNA to undetected levels at last assessment in 2/2017. He will continue this long term unless patient shows future evidence of HBV Surface Ag loss and gain of surface antibody, this will be assessed today. The patient has been shown to have a small 2mm pulmonary nodule on the CT scan in 11/2016. Will continue to monitor, this may require dedicated study if increase in size or change in characteristics. The patient was directed to continue all current medications at the current dosages. There are no contraindications for the patient to take any medications that are necessary for treatment of other medical issues. The patient was advised to remain abstinent from alcohol as noted above. Vaccination for viral hepatitis A is not needed. The patient has serologic evidence of prior exposure or vaccination with immunity. All of the above issues were discussed with the patient. All questions were answered. The patient expressed a clear understanding of the above. [facility] 87 in 3 months following additional imaging studies.       Jairo Cuadra PA-C  Liver [address], [address] [facility], [address]  [phone]

## 2017-05-03 LAB
AFP L3 MFR SERPL: NORMAL % (ref 0–9.9)
AFP SERPL-MCNC: 1.4 NG/ML (ref 0–8)
ALBUMIN SERPL-MCNC: 4.4 G/DL (ref 3.5–5.5)
ALP SERPL-CCNC: 84 IU/L (ref 39–117)
ALT SERPL-CCNC: 16 IU/L (ref 0–44)
AST SERPL-CCNC: 26 IU/L (ref 0–40)
BILIRUB DIRECT SERPL-MCNC: 0.07 MG/DL (ref 0–0.4)
BILIRUB SERPL-MCNC: 0.2 MG/DL (ref 0–1.2)
BUN SERPL-MCNC: 12 MG/DL (ref 6–24)
BUN/CREAT SERPL: 13 (ref 9–20)
CALCIUM SERPL-MCNC: 9.1 MG/DL (ref 8.7–10.2)
CHLORIDE SERPL-SCNC: 101 MMOL/L (ref 96–106)
CO2 SERPL-SCNC: 23 MMOL/L (ref 18–29)
CREAT SERPL-MCNC: 0.91 MG/DL (ref 0.76–1.27)
ERYTHROCYTE [DISTWIDTH] IN BLOOD BY AUTOMATED COUNT: 14.6 % (ref 12.3–15.4)
GLUCOSE SERPL-MCNC: 90 MG/DL (ref 65–99)
HBV DNA SERPL NAA+PROBE-ACNC: <10 IU/ML
HBV DNA SERPL NAA+PROBE-LOG IU: NORMAL LOG10IU/ML
HBV SURFACE AB SER QL: NON REACTIVE
HBV SURFACE AG SERPL QL IA: POSITIVE
HCT VFR BLD AUTO: 40.6 % (ref 37.5–51)
HGB BLD-MCNC: 13.4 G/DL (ref 12.6–17.7)
MCH RBC QN AUTO: 30.7 PG (ref 26.6–33)
MCHC RBC AUTO-ENTMCNC: 33 G/DL (ref 31.5–35.7)
MCV RBC AUTO: 93 FL (ref 79–97)
PLATELET # BLD AUTO: 174 X10E3/UL (ref 150–379)
POTASSIUM SERPL-SCNC: 4.4 MMOL/L (ref 3.5–5.2)
RBC # BLD AUTO: 4.37 X10E6/UL (ref 4.14–5.8)
REF LAB TEST REF RANGE: NORMAL
SODIUM SERPL-SCNC: 139 MMOL/L (ref 134–144)
WBC # BLD AUTO: 5.3 X10E3/UL (ref 3.4–10.8)

## 2017-05-04 NOTE — PROGRESS NOTES
Pt notified of stable lab findings and continued negative HBV with suppressive medication. Continue with medications as prescribed.

## 2017-05-08 ENCOUNTER — TELEPHONE (OUTPATIENT)
Dept: SURGERY | Age: 57
End: 2017-05-08

## 2017-05-10 ENCOUNTER — OFFICE VISIT (OUTPATIENT)
Dept: SURGERY | Age: 57
End: 2017-05-10

## 2017-05-10 VITALS
OXYGEN SATURATION: 98 % | BODY MASS INDEX: 22.82 KG/M2 | HEART RATE: 52 BPM | RESPIRATION RATE: 14 BRPM | HEIGHT: 66 IN | WEIGHT: 142 LBS | TEMPERATURE: 98.4 F | SYSTOLIC BLOOD PRESSURE: 134 MMHG | DIASTOLIC BLOOD PRESSURE: 70 MMHG

## 2017-05-10 DIAGNOSIS — L76.82 INCISIONAL PAIN: Primary | ICD-10-CM

## 2017-05-10 RX ORDER — HYDROMORPHONE HYDROCHLORIDE 2 MG/1
TABLET ORAL
Qty: 30 TAB | Refills: 0 | Status: SHIPPED | OUTPATIENT
Start: 2017-05-10 | End: 2018-03-21

## 2017-05-10 NOTE — MR AVS SNAPSHOT
Visit Information Date & Time Provider Department Dept. Phone Encounter #  
 5/10/2017  1:50 PM Nilton Kilpatrick, [address] [phone] [id number] Your Appointments 8/14/2017  2:30 PM  
Follow Up with [facility] [address] ([facility]) Appt Note: follow up [address] [facility] [phone] [facility] [address]  
59 [facility] [address] Upcoming Health Maintenance Date Due Pneumococcal 19-64 Highest Risk (1 of 3 - PCV13) 1/2/1978 DTaP/Tdap/Td series (1 - Tdap) 1/2/1980 FOBT Q 1 YEAR AGE 50-75 1/2/2009 INFLUENZA AGE 9 TO ADULT 8/1/2017 Allergies as of 5/10/2017  Review Complete On: 5/10/2017 By: Nilton Kilpatrick MD  
  
 Severity Noted Reaction Type Reactions Venom-honey Bee High 04/08/2014    Anaphylaxis Current Immunizations  Reviewed on 1/9/2017 No immunizations on file. Not reviewed this visit You Were Diagnosed With   
  
 Codes Comments Incisional pain    -  Primary ICD-10-CM: R20.8 ICD-9-CM: 902. 0 Vitals BP Pulse Temp Resp Height(growth percentile) Weight(growth percentile) 134/70 (BP 1 Location: Right arm, BP Patient Position: Sitting) (!) 52 98.4 °F (36.9 °C) (Oral) 14 5' 6\" (1.676 m) 142 lb (64.4 kg) SpO2 BMI Smoking Status 98% 22.92 kg/m2 Current Every Day Smoker BMI and BSA Data Body Mass Index Body Surface Area  
 22.92 kg/m 2 1.73 m 2 Preferred Pharmacy Pharmacy Name Phone [facility] [address] [place], [address] [facility] [address] Your Updated Medication List  
  
   
This list is accurate as of: 5/10/17  2:46 PM.  Always use your most recent med list.  
  
  
  
  
 HYDROmorphone 2 mg tablet Commonly known as:  DILAUDID One every 6 to 8 hours as needed for pain  
  
 oxyCODONE-acetaminophen 5-325 mg per tablet Commonly known as:  PERCOCET Take 1-2 Tabs by mouth every four (4) hours as needed. Max Daily Amount: 12 Tabs. polyethylene glycol 17 gram packet Commonly known as:  Joelle Limb Take 1 Packet by mouth daily as needed. For constipation  
  
 tenofovir 300 mg tablet Commonly known as:  Rosemary Gimenezos Take 1 Tab by mouth daily. Prescriptions Printed Refills HYDROmorphone (DILAUDID) 2 mg tablet 0 Sig: One every 6 to 8 hours as needed for pain  
 Class: Print To-Do List   
 07/03/2017 8:00 AM  
(Arrive by 7:45 AM) Appointment with [facility] 1 at [facility] [address] ([phone]) CONTRAST STUDY: 1. The patient should not eat solid food four hours before the appointment but should be encouraged to drink clear liquids. 2.  If you have to drink oral contrast, please pick it up any weekday prior to your appointment, if you cannot please check in 2 hrs before appt time. 3.  The patient will require IV access for contrast administration. 4.  The patient should not take Ibuprofen (Advil, Motrin, etc.) and Naproxen Sodium (Aleve, etc.)  on the day of the exam. Stopping non-steroidal anti-inflammatory agents (NSAIDs) like Ibuprofen decreases the risk of kidney damage from the x-ray contrast (dye). 5.  Bring any non [facility] facility films/images pertaining to the area of interest with you on the day of appointment. 6.  Bring current lab work if available (within last 90 days CMP) ***If scheduled at [facility] Simmons is not available, labs will need to be done before appointment*** 7. Check in at registration at least 30 minutes before appt time unless you were instructed to do otherwise. Please arrive 15 minutes prior to appointment to register Introducing [facility] & HEALTH SERVICES! Jun Mo introduces [employer] patient portal. Now you can access parts of your medical record, email your doctor's office, and request medication refills online. 1. In your internet browser, go to [email or web address] 2. Click on the First Time User? Click Here link in the Sign In box. You will see the New Member Sign Up page. 3. Enter your [employer] Access Code exactly as it appears below. You will not need to use this code after youve completed the sign-up process. If you do not sign up before the expiration date, you must request a new code. · [employer] Access Code: [email or web address] Expires: 5/11/2017  9:58 AM 
 
4. Enter the last four digits of your Social Security Number (xxxx) and Date of Birth (mm/dd/yyyy) as indicated and click Submit. You will be taken to the next sign-up page. 5. Create a Silico Corpt ID. This will be your [employer] login ID and cannot be changed, so think of one that is secure and easy to remember. 6. Create a [employer] password. You can change your password at any time. 7. Enter your Password Reset Question and Answer. This can be used at a later time if you forget your password. 8. Enter your e-mail address. You will receive e-mail notification when new information is available in [address] 9. Click Sign Up. You can now view and download portions of your medical record. 10. Click the Download Summary menu link to download a portable copy of your medical information. If you have questions, please visit the Frequently Asked Questions section of the [employer] website. Remember, [employer] is NOT to be used for urgent needs. For medical emergencies, dial 911. Now available from your iPhone and Android! Please provide this summary of care documentation to your next provider. Your primary care clinician is listed as Latesha Sy. If you have any questions after today's visit, please call [phone].

## 2017-05-10 NOTE — PROGRESS NOTES
Surgery History and Physical    Subjective:      Aditi Engel is a 61 y.o.  male who presents with incisional pain following PARTIAL RIGHT HEPATIC LIVER LOBECTOMY on 01/09/2017. The pt's son reports that the pt is experiencing pain and a burning sensation when the incision is rubbed or palpated. While at work, the pt denies experiencing any pain however while driving back home from [facility] takes about an hour--he will feel pain 9-10/10. He especially feels pain at night as he is going to bed and will take a PERCOCET to help manage things but it is not working as well as it originally did. The son also reports that the pt has been breaking out with a diffuse and inflamed, dorsal rash when he gets out of the shower; they have tried to change shampoos/body washes to curtail this but to no avail. The pt also complains of being temperature sensitive and as a result, does not take hot showers--they are mostly warm. PMHx includes leg numbness which has improved now. 04/18/2017 ABD CT IMPRESSION  No CT evidence of locally recurrent hepatocellular carcinoma,  metastatic disease, or new lesions within cirrhotic appearing liver.     Chief Complaint   Patient presents with    Abdominal Pain     'at surgery site'       Patient Active Problem List    Diagnosis Date Noted    Liver hematoma, infected 03/16/2017    Hepatocellular carcinoma ([address] 01/06/2017    Pulmonary nodule 06/10/2016    Chronic hepatitis B ([address] 04/22/2016    Liver mass, right lobe 04/22/2016     Past Medical History:   Diagnosis Date    Arthritis     Hepatitis B carrier ([address]     Hepatoma ([address] 1/6/2017    Hypertension     PT DENIES    Liver disease     HEPATITIS B    Liver mass, right lobe 4/22/2016    Pulmonary nodule 06/2016    right middle lobe, recheck 11/16 stable at 2 mm, recheck again in 6 mo    Ulnar nerve compression     right      Past Surgical History:   Procedure Laterality Date    HX [address] SURGERY-A. A.    HX ORTHOPAEDIC Right 2014    ELBOW    HX OTHER SURGICAL  01/06/2017    partial right [id number]. [facility]    HX PROSTATECTOMY  1979      Social History   Substance Use Topics    Smoking status: Current Every Day Smoker     Packs/day: 0.50     Years: 20.00     Types: Cigarettes    Smokeless tobacco: Never Used    Alcohol use No      Comment: quit 11/2016      Family History   Problem Relation Age of Onset    Anesth Problems Neg Hx       Prior to Admission medications    Medication Sig Start Date End Date Taking? Authorizing Provider   HYDROmorphone (DILAUDID) 2 mg tablet One every 6 to 8 hours as needed for pain 5/10/17  Yes Nikita Salas MD   tenofovir (VIREAD) 300 mg tablet Take 1 Tab by mouth daily. 2/10/17  Yes KEY Cho   oxyCODONE-acetaminophen (PERCOCET) 5-325 mg per tablet Take 1-2 Tabs by mouth every four (4) hours as needed. Max Daily Amount: 12 Tabs. 2/10/17  Yes KEY Cho   polyethylene glycol (MIRALAX) 17 gram packet Take 1 Packet by mouth daily as needed. For constipation 1/9/17   Jama Bashir NP     Allergies   Allergen Reactions    Venom-Honey Bee Anaphylaxis         Review of Systems:    A comprehensive review of systems was negative except for that written in the History of Present Illness. Objective:     Visit Vitals    /70 (BP 1 Location: Right arm, BP Patient Position: Sitting)    Pulse (!) 52    Temp 98.4 °F (36.9 °C) (Oral)    Resp 14    Ht 5' 6\" (1.676 m)    Wt 142 lb (64.4 kg)    SpO2 98%    BMI 22.92 kg/m2       Physical Exam:  General appearance: alert, cooperative, no distress, appears stated age  Head: Normocephalic, without obvious abnormality, atraumatic  Skin: Skin color, texture, turgor normal. No rashes or lesions  Abdomen: Incision has healed well; point tenderness in the midpoint of the incision located at the RUQ. Neurologic: Grossly normal      Assessment:       ICD-10-CM ICD-9-CM    1.  Incisional pain R20.8 782.0        Plan:     Incision are was locally numbed with 15mL of 1% xylocaine--this procedure was performed in the office. The pt will call tomorrow to apprise us with any changes to the area following the localized numbing. His pain Rx will also be changed from Percocet to Dilaudid to see if pain management is improved. Written by landon Palma, as dictated by Anisha Sampson MD.    Total face to face time with patient: 15 minutes. Greater than 50% of the time was spent in counseling. Signed By: Anisha Sampson MD    May 10, 2017

## 2017-05-10 NOTE — PROGRESS NOTES
1. Have you been to the ER, urgent care clinic since your last visit? Hospitalized since your last visit? No    2. Have you seen or consulted any other health care providers outside of the [address] [place] [address] since your last visit? Include any pap smears or colon screening.  No    Patient had recent CT

## 2017-05-11 ENCOUNTER — TELEPHONE (OUTPATIENT)
Dept: SURGERY | Age: 57
End: 2017-05-11

## 2017-05-11 NOTE — TELEPHONE ENCOUNTER
Area of the incision I injected yesterday is a little swollen and very tender. Arley Dixon tells me it is not red. Will try a heating pad for now. Will try the Dilaudid tonight to see if he can get some relief to be able to sleep.

## 2017-05-11 NOTE — TELEPHONE ENCOUNTER
Please call patient back he said that where dr Aristeo Lofton at is now in pain. Said that he wants Dr. Jil Espino to call back.

## 2017-07-31 ENCOUNTER — HOSPITAL ENCOUNTER (EMERGENCY)
Age: 57
Discharge: HOME | End: 2017-07-31
Attending: EMERGENCY MEDICINE
Payer: COMMERCIAL

## 2017-07-31 ENCOUNTER — APPOINTMENT (OUTPATIENT)
Dept: CT IMAGING | Age: 57
End: 2017-07-31
Attending: EMERGENCY MEDICINE
Payer: COMMERCIAL

## 2017-07-31 VITALS
TEMPERATURE: 98.2 F | BODY MASS INDEX: 21.21 KG/M2 | OXYGEN SATURATION: 97 % | HEIGHT: 66 IN | SYSTOLIC BLOOD PRESSURE: 161 MMHG | WEIGHT: 132 LBS | RESPIRATION RATE: 14 BRPM | DIASTOLIC BLOOD PRESSURE: 73 MMHG | HEART RATE: 56 BPM

## 2017-07-31 DIAGNOSIS — H53.8 BLURRED VISION: Primary | ICD-10-CM

## 2017-07-31 LAB
ALBUMIN SERPL BCP-MCNC: 4.3 G/DL (ref 3.5–5)
ALBUMIN/GLOB SERPL: 0.9 {RATIO} (ref 1.1–2.2)
ALP SERPL-CCNC: 101 U/L (ref 45–117)
ALT SERPL-CCNC: 32 U/L (ref 12–78)
ANION GAP BLD CALC-SCNC: 6 MMOL/L (ref 5–15)
AST SERPL W P-5'-P-CCNC: 41 U/L (ref 15–37)
BASOPHILS # BLD AUTO: 0 K/UL (ref 0–0.1)
BASOPHILS # BLD: 1 % (ref 0–1)
BILIRUB SERPL-MCNC: 0.5 MG/DL (ref 0.2–1)
BUN SERPL-MCNC: 6 MG/DL (ref 6–20)
BUN/CREAT SERPL: 6 (ref 12–20)
CALCIUM SERPL-MCNC: 9.1 MG/DL (ref 8.5–10.1)
CHLORIDE SERPL-SCNC: 97 MMOL/L (ref 97–108)
CK SERPL-CCNC: 203 U/L (ref 39–308)
CO2 SERPL-SCNC: 28 MMOL/L (ref 21–32)
CREAT SERPL-MCNC: 0.94 MG/DL (ref 0.7–1.3)
EOSINOPHIL # BLD: 0.1 K/UL (ref 0–0.4)
EOSINOPHIL NFR BLD: 1 % (ref 0–7)
ERYTHROCYTE [DISTWIDTH] IN BLOOD BY AUTOMATED COUNT: 15.4 % (ref 11.5–14.5)
GLOBULIN SER CALC-MCNC: 5 G/DL (ref 2–4)
GLUCOSE BLD STRIP.AUTO-MCNC: 106 MG/DL (ref 65–100)
GLUCOSE SERPL-MCNC: 98 MG/DL (ref 65–100)
HCT VFR BLD AUTO: 48.9 % (ref 36.6–50.3)
HGB BLD-MCNC: 17 G/DL (ref 12.1–17)
INR BLD: 1 (ref 0.9–1.2)
LYMPHOCYTES # BLD AUTO: 45 % (ref 12–49)
LYMPHOCYTES # BLD: 2.6 K/UL (ref 0.8–3.5)
MCH RBC QN AUTO: 32.5 PG (ref 26–34)
MCHC RBC AUTO-ENTMCNC: 34.8 G/DL (ref 30–36.5)
MCV RBC AUTO: 93.5 FL (ref 80–99)
MONOCYTES # BLD: 0.4 K/UL (ref 0–1)
MONOCYTES NFR BLD AUTO: 7 % (ref 5–13)
NEUTS SEG # BLD: 2.7 K/UL (ref 1.8–8)
NEUTS SEG NFR BLD AUTO: 46 % (ref 32–75)
PLATELET # BLD AUTO: 211 K/UL (ref 150–400)
POTASSIUM SERPL-SCNC: 3.9 MMOL/L (ref 3.5–5.1)
PROT SERPL-MCNC: 9.3 G/DL (ref 6.4–8.2)
RBC # BLD AUTO: 5.23 M/UL (ref 4.1–5.7)
SERVICE CMNT-IMP: ABNORMAL
SODIUM SERPL-SCNC: 131 MMOL/L (ref 136–145)
TROPONIN I SERPL-MCNC: <0.04 NG/ML
WBC # BLD AUTO: 5.7 K/UL (ref 4.1–11.1)

## 2017-07-31 PROCEDURE — 80053 COMPREHEN METABOLIC PANEL: CPT | Performed by: EMERGENCY MEDICINE

## 2017-07-31 PROCEDURE — 82550 ASSAY OF CK (CPK): CPT | Performed by: EMERGENCY MEDICINE

## 2017-07-31 PROCEDURE — 85025 COMPLETE CBC W/AUTO DIFF WBC: CPT | Performed by: EMERGENCY MEDICINE

## 2017-07-31 PROCEDURE — 85610 PROTHROMBIN TIME: CPT

## 2017-07-31 PROCEDURE — 99285 EMERGENCY DEPT VISIT HI MDM: CPT

## 2017-07-31 PROCEDURE — 82962 GLUCOSE BLOOD TEST: CPT

## 2017-07-31 PROCEDURE — 36415 COLL VENOUS BLD VENIPUNCTURE: CPT | Performed by: EMERGENCY MEDICINE

## 2017-07-31 PROCEDURE — 84484 ASSAY OF TROPONIN QUANT: CPT | Performed by: EMERGENCY MEDICINE

## 2017-07-31 PROCEDURE — 70450 CT HEAD/BRAIN W/O DYE: CPT

## 2017-07-31 NOTE — ED PROVIDER NOTES
HPI Comments: 61 y.o. male with past medical history significant for HTN(denies), Hep B, liver mass, and hepatoma  who presents from home with chief complaint of blurred vision. Pt does not speak english but son is able to translate from [place]. Son states that 7 hours ago they were driving to work when Pt had to pull over because Pt was complaining of blurred vision in both eyes. Son started driving and Pt kept having on and off episodes of the blurred vision. Son denies Pt having any change in speech or gait. He only wears glasses to read. Pt had a tumor removed from his liver 6 months ago and has not drank alcohol since then until the last couple days. There are no other acute medical concerns at this time. Social hx: Smokes tobacco. Drinks alcohol  PCP: Taryn Multani MD    Note written by Sherita Pierce, as dictated by Lupe Damon MD 10:39 AM      The history is provided by the patient. No  was used. Past Medical History:   Diagnosis Date    Arthritis     [facility]     Hepatoma ([facility] 1/6/2017    Hypertension     PT DENIES    Liver disease     HEPATITIS B    Liver mass, right lobe 4/22/2016    Pulmonary nodule 06/2016    right middle lobe, recheck 11/16 stable at 2 mm, recheck again in 6 mo    Ulnar nerve compression     right       Past Surgical History:   Procedure Laterality Date     [address]    HX ORTHOPAEDIC Right 2014    ELBOW    HX OTHER SURGICAL  01/06/2017    partial right [id number]. [place] [facility]    HX PROSTATECTOMY  1979         Family History:   Problem Relation Age of Onset    Anesth Problems Neg Hx        Social History     Social History    Marital status:      Spouse name: N/A    Number of children: N/A    Years of education: N/A     Occupational History    Not on file.      Social History Main Topics    Smoking status: Current Every Day Smoker     Packs/day: 0.50     Years: 20. 00     Types: Cigarettes    Smokeless tobacco: Never Used    Alcohol use No      Comment: quit 11/2016    Drug use: No    Sexual activity: Yes     Partners: Female     Other Topics Concern    Not on file     Social History Narrative         ALLERGIES: Venom-honey bee    Review of Systems   Constitutional: Negative for appetite change, chills and fever. HENT: Negative for rhinorrhea, sore throat and trouble swallowing. Eyes: Positive for pain and visual disturbance. Negative for photophobia. Respiratory: Negative for cough and shortness of breath. Cardiovascular: Negative for chest pain and palpitations. Gastrointestinal: Negative for abdominal pain, nausea and vomiting. Genitourinary: Negative for dysuria, frequency and hematuria. Musculoskeletal: Negative for arthralgias and gait problem. Neurological: Negative for dizziness, syncope, speech difficulty and weakness. Psychiatric/Behavioral: Negative for behavioral problems. The patient is not nervous/anxious. All other systems reviewed and are negative. Vitals:    07/31/17 1004   BP: 158/88   Pulse: 62   Resp: 16   Temp: 98 °F (36.7 °C)   SpO2: 98%   Weight: 59.9 kg (132 lb)   Height: 5' 6\" (1.676 m)            Physical Exam   Constitutional: He appears well-developed and well-nourished. HENT:   Head: Normocephalic and atraumatic. Mouth/Throat: Oropharynx is clear and moist.   Eyes: EOM are normal. Pupils are equal, round, and reactive to light. Neck: Normal range of motion. Neck supple. Cardiovascular: Normal rate, regular rhythm, normal heart sounds and intact distal pulses. Exam reveals no gallop and no friction rub. No murmur heard. Pulmonary/Chest: Effort normal. No respiratory distress. He has no wheezes. He has no rales. Abdominal: Soft. There is no tenderness. There is no rebound. Musculoskeletal: Normal range of motion. He exhibits no tenderness. Neurological: He is alert. No cranial nerve deficit. Motor; symmetric. No asterixis   Skin: No erythema. Psychiatric: He has a normal mood and affect. His behavior is normal.   Nursing note and vitals reviewed. Note written by Sherita Wolf, as dictated by Obdulia Menard MD 10:40 AM      [facility]  ED Course       Procedures    PROGRESS NOTE:  11:37 AM  Pt's vision is better now and it is only slightly blurry now.  Sees Dr Diana Ferraro for Hep B

## 2017-07-31 NOTE — PROGRESS NOTES
Spiritual Care Assessment/Progress Notes    Virginia Concepcion [id number]  [id number]    1960  61 y.o.  male    Patient Telephone Number: [phone] (home)   Taoist Affiliation: No preference   Language: English   Extended Emergency Contact Information  Primary Emergency Contact: Oh Lockhart  Address: [address], [address] [place] [address] Phone: [phone]  Mobile Phone: [phone]  Relation: Child  Secondary Emergency Contact: [address] [place] [address] Street Phone: [phone]  Relation: Child   Patient Active Problem List    Diagnosis Date Noted    Liver hematoma, infected 03/16/2017    Hepatocellular carcinoma ([facility] 01/06/2017    Pulmonary nodule 06/10/2016    Chronic hepatitis B ([facility] 04/22/2016    Liver mass, right lobe 04/22/2016        Date: 7/31/2017       Level of Taoist/Spiritual Activity:  []         Involved in darrell tradition/spiritual practice    []         Not involved in darrell tradition/spiritual practice  []         Spiritually oriented    []         Claims no spiritual orientation    []         seeking spiritual identity  []         Feels alienated from Latter day practice/tradition  []         Feels angry about Latter day practice/tradition  [x]         Spirituality/Latter day tradition is a resource for coping at this time.   []         Not able to assess due to medical condition    Services Provided Today:  []         crisis intervention    []         reading Scriptures  [x]         spiritual assessment    []         prayer  []         empathic listening/emotional support  []         rites and rituals (cite in comments)  []         life review     []         Latter day support  []         theological development   []         advocacy  []         ethical dialog     []         blessing  []         bereavement support    [x]         support to family  []         anticipatory grief support   []         help with AMD  []         spiritual guidance    [] meditation      Spiritual Care Needs  []         Emotional Support  []         Spiritual/Gnosticist Care  []         Loss/Adjustment  []         Advocacy/Referral                /Ethics  [x]         No needs expressed at               this time  []         Other: (note in               comments)  [address]  []         Follow up visits with               pt/family  []         Provide materials  []         Schedule sacraments  []         Contact Community               Clergy  [x]         Follow up as needed  []         Other: (note in               comments)     Initial spiritual assessment in ER29 in response to code stroke. Son Marvin Mo was present in room and explained the circumstances of his hospitalization. Father is from [place] [facility] and is not fluent in [email or web address]. Marvin Mo was gracious in demeanor---politely declined spiritual care at this time. Assured of  availability as needed. [facility]'s Staff  (Armond Dumont Patient Care Specialist)   Paging Service [phone])

## 2017-07-31 NOTE — DISCHARGE INSTRUCTIONS
We hope that we have addressed all of your medical concerns. The examination and treatment you received in the Emergency Department were for an emergent problem and were not intended as complete care. It is important that you follow up with your healthcare provider(s) for ongoing care. If your symptoms worsen or do not improve as expected, and you are unable to reach your usual health care provider(s), you should return to the Emergency Department. Today's healthcare is undergoing tremendous change, and patient satisfaction surveys are one of the many tools to assess the quality of medical care. You may receive a survey from the [facility] regarding your experience in the Emergency Department. I hope that your experience has been completely positive, particularly the medical care that I provided. As such, please participate in the survey; anything less than excellent does not meet my expectations or intentions. [facility] and [facility] participate in nationally recognized quality of care measures. If your blood pressure is greater than 120/80, as reported below, we urge that you seek medical care to address the potential of high blood pressure, commonly known as hypertension. Hypertension can be hereditary or can be caused by certain medical conditions, pain, stress, or \"white coat syndrome. \"       Please make an appointment with your health care provider(s) for follow up of your Emergency Department visit. VITALS:   Patient Vitals for the past 8 hrs:   Temp Pulse Resp BP SpO2   07/31/17 1200 - 64 15 149/79 97 %   07/31/17 1115 - (!) 49 15 146/77 93 %   07/31/17 1045 - (!) 55 18 165/81 95 %   07/31/17 1004 98 °F (36.7 °C) 62 16 158/88 98 %          Thank you for allowing us to provide you with medical care today. We realize that you have many choices for your emergency care needs.   Please choose us in the future for any continued health care needs. Milly Jenkins MD    [address] [facility].   Office: [phone]            Recent Results (from the past 24 hour(s))   GLUCOSE, POC    Collection Time: 07/31/17 10:13 AM   Result Value Ref Range    Glucose (POC) 106 (H) 65 - 100 mg/dL    Performed by Allie Foreman    CBC WITH AUTOMATED DIFF    Collection Time: 07/31/17 10:20 AM   Result Value Ref Range    WBC 5.7 4.1 - 11.1 K/uL    RBC 5.23 4. 10 - 5.70 M/uL    HGB 17.0 12.1 - 17.0 g/dL    HCT 48.9 36.6 - 50.3 %    MCV 93.5 80.0 - 99.0 FL    MCH 32.5 26.0 - 34.0 PG    MCHC 34.8 30.0 - 36.5 g/dL    RDW 15.4 (H) 11.5 - 14.5 %    PLATELET 164 557 - 324 K/uL    NEUTROPHILS 46 32 - 75 %    LYMPHOCYTES 45 12 - 49 %    MONOCYTES 7 5 - 13 %    EOSINOPHILS 1 0 - 7 %    BASOPHILS 1 0 - 1 %    ABS. NEUTROPHILS 2.7 1.8 - 8.0 K/UL    ABS. LYMPHOCYTES 2.6 0.8 - 3.5 K/UL    ABS. MONOCYTES 0.4 0.0 - 1.0 K/UL    ABS. EOSINOPHILS 0.1 0.0 - 0.4 K/UL    ABS. BASOPHILS 0.0 0.0 - 0.1 K/UL   METABOLIC PANEL, COMPREHENSIVE    Collection Time: 07/31/17 10:20 AM   Result Value Ref Range    Sodium 131 (L) 136 - 145 mmol/L    Potassium 3.9 3.5 - 5.1 mmol/L    Chloride 97 97 - 108 mmol/L    CO2 28 21 - 32 mmol/L    Anion gap 6 5 - 15 mmol/L    Glucose 98 65 - 100 mg/dL    BUN 6 6 - 20 MG/DL    Creatinine 0.94 0.70 - 1.30 MG/DL    BUN/Creatinine ratio 6 (L) 12 - 20      GFR est AA >60 >60 ml/min/1.73m2    GFR est non-AA >60 >60 ml/min/1.73m2    Calcium 9.1 8.5 - 10.1 MG/DL    Bilirubin, total 0.5 0.2 - 1.0 MG/DL    ALT (SGPT) 32 12 - 78 U/L    AST (SGOT) 41 (H) 15 - 37 U/L    Alk.  phosphatase 101 45 - 117 U/L    Protein, total 9.3 (H) 6.4 - 8.2 g/dL    Albumin 4.3 3.5 - 5.0 g/dL    Globulin 5.0 (H) 2.0 - 4.0 g/dL    A-G Ratio 0.9 (L) 1.1 - 2.2     TROPONIN I    Collection Time: 07/31/17 10:20 AM   Result Value Ref Range    Troponin-I, Qt. <0.04 <0.05 ng/mL   CK W/ REFLX CKMB    Collection Time: 07/31/17 10:20 AM   Result Value Ref Range     39 - 308 U/L       Ct Code Neuro Head Wo Contrast    Result Date: 7/31/2017  EXAM:  CT CODE NEURO HEAD WO CONTRAST INDICATION:   Blurred vision since this morning. COMPARISON: None. CONTRAST:  None. TECHNIQUE:  Unenhanced CT of the head was performed using 5 mm images. Brain and bone windows were generated. Coronal and sagittal reformatted images were then obtained. CT dose reduction was achieved through the use of a standardized protocol tailored for this examination and automatic exposure control for dose modulation. FINDINGS: The ventricles and sulci are normal in size, shape and configuration and midline. There is no intracranial hemorrhage, extra-axial collection, mass, mass effect or midline shift. The basilar cisterns are open. No acute infarct is identified. The bone windows demonstrate no abnormalities. The visualized portions of the paranasal sinuses and mastoid air cells are clear. IMPRESSION: Normal ventricular size. No intracranial bleed or shift of the midline.

## 2017-09-28 ENCOUNTER — OFFICE VISIT (OUTPATIENT)
Dept: HEMATOLOGY | Age: 57
End: 2017-09-28

## 2017-09-28 VITALS
TEMPERATURE: 97.5 F | SYSTOLIC BLOOD PRESSURE: 156 MMHG | DIASTOLIC BLOOD PRESSURE: 79 MMHG | HEART RATE: 58 BPM | OXYGEN SATURATION: 96 % | WEIGHT: 140 LBS | BODY MASS INDEX: 22.6 KG/M2

## 2017-09-28 DIAGNOSIS — B18.1 CHRONIC HEPATITIS B (HCC): Primary | ICD-10-CM

## 2017-09-28 DIAGNOSIS — C22.0 HEPATOCELLULAR CARCINOMA (HCC): ICD-10-CM

## 2017-09-28 NOTE — PROGRESS NOTES
93 Donna Everett MD, Say Arthur, MORALES Monteiro MD, [address], MD Evangelina Mora NP Willie Grip, NP        [address] [facility], [address] [address]     [phone]     FAX: [address] [place] [address] Drive, [address] [facility], [address]     [phone]     FAX: [phone]     Patient Care Team:  Aimee Reed MD as PCP - General (Family Practice)  Shaka Sultana MD (Cardiology)  Stevo Teran MD (General Surgery)  Ree Drummond RN as Nurse Navigator    Patient Active Problem List   Diagnosis Code    Chronic hepatitis B ([facility] [address] B18.1    Liver mass, right lobe R16.0    Pulmonary nodule R91.1    Hepatocellular carcinoma ([address] C22.0    Liver hematoma, infected S36.112A       Homero Lott returns to the [address] for management of [address] due to chronic HBV. The active problem list, all pertinent past medical history, medications, radiologic findings and laboratory findings related to the liver disorder were reviewed with the patient. The patient is a 61 y.o.  male from [place] who was first noted to have abnormalities in liver transaminases in 3/2016. Serologic studies demonstrated he was positive for HBV and ASMA. His ALT and HBV DNA were elevated and Viread 300 mg daily was started (June 2016). He has tolerated this medication well without new physical complaints and has shown significant suppression of HBV at last office visit. Ultrasound of the liver was performed in 3/2016. The results of the imaging demonstrated a normal appearing liver. There was a 1.8 cm lesion in the right lobe. CT scan of the liver was performed in 5/2016. This demonstrated the liver lesion was a hemangioma. A new pulmonary 2 cm nodule was observed.   Patient had routine interval US of liver in 11/2016 which demonstrated interval increase of size of lesion. CT scan of 11/2016 confirmed increase in size and characteristics of enhancement and washout concerning for malignancy. Given the patient has no underlying cirrhosis, resection of this lesion was thought to be best approach. He underwent resection of the segment 8 lesion in early 1/2017 and tolerated this surgery well. Pathology confirmed moderately differentiated [address] with clear margins in the setting of bridging fibrosis. Patient has continued to have \"pulling sensation\" and pain inferior to the incision site, especially with deep breath/coughing and in particular at rest.  He was seen in follow-up with surgery and no new intervention was initiated. Patient had been taking pain medication on a daily basis in order to sleep but discontinued this as of several months ago. He has built up a tolerance and is doing reasonably well at this time. He continues to take his Viread without issues and no new physical complaints at this time. Despite ongoing abdominal pain, the patient completes all daily activities without any functional limitations. The patient has not experienced fatigue, problems concentrating, swelling of the abdomen, swelling of the lower extremities, hematemesis, hematochezia. ALLERGIES  Allergies   Allergen Reactions    Venom-Honey Bee Anaphylaxis       MEDICATIONS  Current Outpatient Prescriptions   Medication Sig    HYDROmorphone (DILAUDID) 2 mg tablet One every 6 to 8 hours as needed for pain    tenofovir (VIREAD) 300 mg tablet Take 1 Tab by mouth daily.  oxyCODONE-acetaminophen (PERCOCET) 5-325 mg per tablet Take 1-2 Tabs by mouth every four (4) hours as needed. Max Daily Amount: 12 Tabs.  polyethylene glycol (MIRALAX) 17 gram packet Take 1 Packet by mouth daily as needed. For constipation     No current facility-administered medications for this visit.         SYSTEM REVIEW NOT RELATED TO LIVER DISEASE OR REVIEWED ABOVE:  Constitution systems: Negative for fever, chills, weight gain, weight loss. Eyes: Negative for visual changes. ENT: Negative for sore throat, painful swallowing. Respiratory: Negative for cough, hemoptysis, SOB. Cardiology: Negative for chest pain, palpitations. GI:  Negative for constipation or diarrhea. Some sharp/pulling pain inferior to incision, persists ~9 month post-op. : Negative for urinary frequency, dysuria, hematuria, nocturia. Skin: Negative for rash. Diminished sensitivity of the skin overlying the right anterior thigh since surgery. Hematology: Negative for easy bruising, blood clots. Musculo-skeletal: Negative for back pain, muscle pain, weakness. Neurologic: Negative for headaches, dizziness, vertigo, memory problems not related to HE. Psychology: Negative for anxiety, depression. FAMILY HISTORY:  The father  of lung cancer. The patient has no knowledge of the mother's medical condition. There is no family history of liver disease. SOCIAL HISTORY:  The patient is . The patient has 5 children, and 3 grandchildren. The patient currently smokes half pack of tobacco daily. The patient has previously consumed alcohol in excess. He now consumes 1-2 alcoholic beverages daily and up to 4 on each weekend day. This represents a significant reduction for him. The patient currently works full time as a . PHYSICAL EXAMINATION:  Visit Vitals    /79    Pulse (!) 58    Temp 97.5 °F (36.4 °C) (Tympanic)    Wt 140 lb (63.5 kg)    SpO2 96%    BMI 22.6 kg/m2     General: No acute distress. Eyes: Sclera anicteric. ENT: No oral lesions. Thyroid normal.  Nodes: No adenopathy. Skin: No spider angiomata. No jaundice. No palmar erythema. Respiratory: Lungs clear to auscultation. Cardiovascular: Regular heart rate. No murmurs. No JVD.   Abdomen: 9-10 cm RUQ incisional scar, well-approximated without keloid or hernia. No surrounding erythema. Tender with palpation along the inferior border of the liver, which is prominent. Small, reducible umbilical hernia. Spleen not palpable. No obvious ascites. Extremities: No edema. No muscle wasting. No gross arthritic changes. Neurologic: Alert and oriented. Cranial nerves grossly intact. No asterixis. LABORATORY STUDIES:  [facility] Ref Rng & Units 7/31/2017 5/2/2017 2/10/2017   WBC 4.1 - 11.1 K/uL 5.7 5.3 7.8   ANC 1.8 - 8.0 K/UL 2.7     HGB 12.1 - 17.0 g/dL 17.0 13.4 13.0    - 400 K/uL 211 174 176   INR <1.2   1.0     AST 15 - 37 U/L 41 (H) 26 23   ALT 12 - 78 U/L 32 16 17   Alk Phos 45 - 117 U/L 101 84 84   Bili, Total 0.2 - 1.0 MG/DL 0.5 0.2 0.3   Bili, Direct 0.00 - 0.40 mg/dL  0.07 0.11   Albumin 3.5 - 5.0 g/dL 4.3 4.4 4.0   BUN 6 - 20 MG/DL 6 12 12   Creat 0.70 - 1.30 MG/DL 0.94 0.91 0.87   Na 136 - 145 mmol/L 131 (L) 139 142   K 3.5 - 5.1 mmol/L 3.9 4.4 3.9   Cl 97 - 108 mmol/L 97 101 103   CO2 21 - 32 mmol/L 28 23 24   Glucose 65 - 100 mg/dL 98 90 115 (H)   Magnesium 1.6 - 2.4 mg/dL        Cancer Screening Latest Ref Rng & Units 5/2/2017 2/10/2017 4/22/2016   AFP, Serum 0.0 - 8.0 ng/mL 1.4 1.4 17.2 (H)   AFP-L3% 0.0 - 9.9 % Comment Comment 8.9     Virology Latest Ref Rng & Units 5/2/2017 2/10/2017   HBV DNA IU/mL <10 <10   Additional lab values drawn at today's office visit are pending at the time of documentation. SEROLOGIES:  Serologies Latest Ref Rng 6/10/2016 4/22/2016   Hep A Ab, Total Negative  Positive (A)   Hep B Surface Ag Negative  Positive (A)   Hep B Core Ab, Total Negative  Positive (A)   Hep B Surface AB QL   Non Reactive   Hep C Ab 0.0 - 0.9 s/co ratio  0.1   Hep D AB Not Detected Not Detected    Ferritin 30 - 400 ng/mL  359   Iron % Saturation 15 - 55 %  76 (HH)   AKIN, IFA   Negative   ASMCA 0 - 19 Units  83 (H)   Alpha-1 antitrypsin level 90 - 200 mg/dL  176   6/2016.   HIV Ab is negative. HBeAg is negative, HBeAb is positive. LIVER HISTOLOGY:  6/2016. [facility]. EkPa was 11.8. IQR/med 26%. The results suggested a fibrosis level of F3.  1/2017. Liver resection. 2.2 cm HCC, moderately differentiated with free margins. Adjacent liver shows chronic hepatitis with bridging fibrosis (grade 2, stage 3)    ENDOSCOPIC PROCEDURES:  Not available or performed    RADIOLOGY:  3/2016. Ultrasound of liver. Normal appearing liver. Single hyperecholic mass in the right lobe measuring 1.5 cm. Consistent with hemangioma. 5/2016. CT scan abdomen with and without IV contrast. 1.9 cm hemangioma in the right hepatic lobe. No additional liver lesion. 11/2016. Ultrasound of liver. Increase in size of the right lobe hepatic mass. CT scan or MRI recommended. 11/2016. CT scan abdomen. Interval enlargement of hypervascular segment 8 liverlesion now 2.5 x 1.8 x 3.1 cm. In the setting of hepatitis B, despite lack of CT evidence of cirrhosis, hepatocellular carcinoma is more likely than an unusual presentation of a benign lesion such as atypical hemangioma. 3/2017. CT of abdomen. No residual enhancing tumor  Gas within the right lobe resection site is unexpected 2 months postoperatively. Surgical packing material, abscess, and less likely retained postoperative gas, are possible. 4/2017. CT abdomen. No CT evidence of locally recurrent hepatocellular carcinoma, metastatic disease, or new lesions within cirrhotic appearing liver. Resolution of fluid collection. OTHER TESTING:  Not available or performed    ASSESSMENT AND PLAN:  Chronic HBV with bridging fibrosis. Liver transaminases normalized following resection for [address] No evidence of local recurrence at the site of resection on 4/2017 imaging. Will plan to continue with imaging studies every 3 months through one year post-resection.   He is overdue for imaging and this will be scheduled at this time. Patient is also due for repeat imaging of the chest to follow-up on a pulmonary nodule. Continued post-operative pain. Apparent resolution of fluid collection. Patient reports no change in pattern and is able to tolerate the current level. Based upon laboratory studies and imaging the patient does not appear to have cirrhosis - recent surgical liver biopsy demonstrates bridging fibosis. Will monitor these values, tumor marker and HBV levels in response to recent surgical resection of the liver. HBV treatment. Currently on Viread 300 mg daily with excellent tolerance. He is responding well to this treatment with a significant decline is his HBV DNA to undetected levels as of 2/2017. He will continue this long term unless patient shows future evidence of HBV Surface Ag loss and gain of surface antibody. The patient has been shown to have a small 2 mm pulmonary nodule on the CT scan in 11/2016. Will continue to monitor, this may require dedicated study if increase in size or change in characteristics. The patient was directed to continue all current medications at the current dosages. There are no contraindications for the patient to take any medications that are necessary for treatment of other medical issues. The patient was advised to remain abstinent from alcohol as noted above. Vaccination for viral hepatitis A is not needed. The patient has serologic evidence of prior exposure or vaccination with immunity. All of the above issues were discussed with the patient. All questions were answered. The patient expressed a clear understanding of the above.     [address] [address] in 3 months following additional imaging studies in the meantime      Qi Genao PA-C  [facility] [place] of [address] [place] [address] [address], [address] [address]  [phone]

## 2017-09-28 NOTE — MR AVS SNAPSHOT
Visit Information Date & Time Provider Department Dept. Phone Encounter #  
 9/28/2017  1:30 PM Edgard Sales, [address] [address] [id number] Your Appointments 1/29/2018  2:00 PM  
Follow Up with KEY [address] ([address]) Appt Note: Follow up [facility] [phone] [facility] [address]  
[facility] Isai [address] Upcoming Health Maintenance Date Due Pneumococcal 19-64 Highest Risk (1 of 3 - PCV13) 1/2/1978 DTaP/Tdap/Td series (1 - Tdap) 1/2/1980 FOBT Q 1 YEAR AGE 50-75 1/2/2009 INFLUENZA AGE 9 TO ADULT 8/1/2017 Allergies as of 9/28/2017  Review Complete On: 9/28/2017 By: Deb Schroeder Severity Noted Reaction Type Reactions Venom-honey Bee High 04/08/2014    Anaphylaxis Current Immunizations  Reviewed on 1/9/2017 No immunizations on file. Not reviewed this visit You Were Diagnosed With   
  
 Codes Comments Chronic hepatitis B ([facility]    -  Primary ICD-10-CM: B18.1 ICD-9-CM: 070.32 Vitals BP Pulse Temp Weight(growth percentile) SpO2 BMI  
 156/79 (!) 58 97.5 °F (36.4 °C) (Tympanic) 140 lb (63.5 kg) 96% 22.6 kg/m2 Smoking Status Current Every Day Smoker Vitals History BMI and BSA Data Body Mass Index Body Surface Area  
 22.6 kg/m 2 1.72 m 2 Preferred Pharmacy Pharmacy Name Phone [facility] [address], [address] [place] [address] Street  Your Updated Medication List  
  
   
This list is accurate as of: 9/28/17  1:37 PM.  Always use your most recent med list.  
  
  
  
  
 HYDROmorphone 2 mg tablet Commonly known as:  DILAUDID One every 6 to 8 hours as needed for pain  
  
 oxyCODONE-acetaminophen 5-325 mg per tablet Commonly known as:  PERCOCET Take 1-2 Tabs by mouth every four (4) hours as needed. Max Daily Amount: 12 Tabs. polyethylene glycol 17 gram packet Commonly known as:  Lawerance Amas Take 1 Packet by mouth daily as needed. For constipation  
  
 tenofovir DISOPROXIL FUMARATE 300 mg tablet Commonly known as:  Mp Rangel Take 1 Tab by mouth daily. We Performed the Following AFP WITH AFP-L3% [[id number] Custom] CBC W/O DIFF [09578 CPT(R)] HEPATIC FUNCTION PANEL (6) [[id number] Custom] HEPATITIS B, QT, PCR [42150 CPT(R)] METABOLIC PANEL, BASIC [67397 CPT(R)] PROTHROMBIN TIME + INR [96222 CPT(R)] Introducing [facility]! Pamela Nunez introduces [employer] patient portal. Now you can access parts of your medical record, email your doctor's office, and request medication refills online. 1. In your internet browser, go to [email or web address] 2. Click on the First Time User? Click Here link in the Sign In box. You will see the New Member Sign Up page. 3. Enter your [employer] Access Code exactly as it appears below. You will not need to use this code after youve completed the sign-up process. If you do not sign up before the expiration date, you must request a new code. · [employer] Access Code: [email or web address] Expires: 10/29/2017 12:11 PM 
 
4. Enter the last four digits of your Social Security Number (xxxx) and Date of Birth (mm/dd/yyyy) as indicated and click Submit. You will be taken to the next sign-up page. 5. Create a [email or web address] ID. This will be your Inst[employer] login ID and cannot be changed, so think of one that is secure and easy to remember. 6. Create a [employer] password. You can change your password at any time. 7. Enter your Password Reset Question and Answer. This can be used at a later time if you forget your password. 8. Enter your e-mail address. You will receive e-mail notification when new information is available in [address] 9. Click Sign Up. You can now view and download portions of your medical record. 10. Click the Download Summary menu link to download a portable copy of your medical information. If you have questions, please visit the Frequently Asked Questions section of the [employer] website. Remember, [employer] is NOT to be used for urgent needs. For medical emergencies, dial 911. Now available from your iPhone and Android! Please provide this summary of care documentation to your next provider. Your primary care clinician is listed as Latesha Sy. If you have any questions after today's visit, please call [phone].

## 2017-09-29 LAB
AFP L3 MFR SERPL: NORMAL % (ref 0–9.9)
AFP SERPL-MCNC: 2 NG/ML (ref 0–8)
ALBUMIN SERPL-MCNC: 4.8 G/DL (ref 3.5–5.5)
ALP SERPL-CCNC: 71 IU/L (ref 39–117)
ALT SERPL-CCNC: 24 IU/L (ref 0–44)
AST SERPL-CCNC: 39 IU/L (ref 0–40)
BILIRUB DIRECT SERPL-MCNC: 0.14 MG/DL (ref 0–0.4)
BILIRUB SERPL-MCNC: 0.3 MG/DL (ref 0–1.2)
BUN SERPL-MCNC: 10 MG/DL (ref 6–24)
BUN/CREAT SERPL: 8 (ref 9–20)
CALCIUM SERPL-MCNC: 9.7 MG/DL (ref 8.7–10.2)
CHLORIDE SERPL-SCNC: 99 MMOL/L (ref 96–106)
CO2 SERPL-SCNC: 28 MMOL/L (ref 18–29)
CREAT SERPL-MCNC: 1.18 MG/DL (ref 0.76–1.27)
ERYTHROCYTE [DISTWIDTH] IN BLOOD BY AUTOMATED COUNT: 14.8 % (ref 12.3–15.4)
GLUCOSE SERPL-MCNC: 91 MG/DL (ref 65–99)
HBV DNA SERPL NAA+PROBE-ACNC: <10 IU/ML
HBV DNA SERPL NAA+PROBE-LOG IU: NORMAL LOG10IU/ML
HCT VFR BLD AUTO: 45.1 % (ref 37.5–51)
HGB BLD-MCNC: 15.5 G/DL (ref 12.6–17.7)
MCH RBC QN AUTO: 33.5 PG (ref 26.6–33)
MCHC RBC AUTO-ENTMCNC: 34.4 G/DL (ref 31.5–35.7)
MCV RBC AUTO: 97 FL (ref 79–97)
PLATELET # BLD AUTO: 213 X10E3/UL (ref 150–379)
POTASSIUM SERPL-SCNC: 5.6 MMOL/L (ref 3.5–5.2)
RBC # BLD AUTO: 4.63 X10E6/UL (ref 4.14–5.8)
REF LAB TEST REF RANGE: NORMAL
SODIUM SERPL-SCNC: 141 MMOL/L (ref 134–144)
WBC # BLD AUTO: 7.7 X10E3/UL (ref 3.4–10.8)

## 2017-10-02 NOTE — PROGRESS NOTES
Pt notified of stable findings and continued negative HBV level. AFP also within normal limits. Follow-up in 1/2018 with CT of the abdomen in the meantime.

## 2018-01-29 ENCOUNTER — OFFICE VISIT (OUTPATIENT)
Dept: HEMATOLOGY | Age: 58
End: 2018-01-29

## 2018-01-29 VITALS
SYSTOLIC BLOOD PRESSURE: 181 MMHG | DIASTOLIC BLOOD PRESSURE: 98 MMHG | WEIGHT: 147 LBS | TEMPERATURE: 98 F | HEART RATE: 62 BPM | BODY MASS INDEX: 23.73 KG/M2 | OXYGEN SATURATION: 96 %

## 2018-01-29 DIAGNOSIS — R16.0 LIVER MASS, RIGHT LOBE: ICD-10-CM

## 2018-01-29 DIAGNOSIS — C22.0 HEPATOCELLULAR CARCINOMA (HCC): Primary | ICD-10-CM

## 2018-01-29 DIAGNOSIS — B18.1 CHRONIC HEPATITIS B (HCC): ICD-10-CM

## 2018-01-29 RX ORDER — TENOFOVIR DISOPROXIL FUMARATE 300 MG/1
300 TABLET, FILM COATED ORAL DAILY
Qty: 28 TAB | Refills: 11 | Status: SHIPPED | OUTPATIENT
Start: 2018-01-29 | End: 2019-02-22 | Stop reason: SDUPTHER

## 2018-01-29 NOTE — PROGRESS NOTES
93 Donna Everett MD, MAICOL Conway PA-C Renard Amos, MD, Dung villaseñor, MD Evangelina Shipman NP Justin Feller, NP        [address] [facility], [address] [address]  22.     [phone]     FAX: [address] [place] [address], [address]     [phone]     FAX: [phone]     Patient Care Team:  Bridget Nunez MD as PCP - General (Family Practice)  Hunter Swartz MD (Cardiology)  Maulik Gutierrez MD (General Surgery)  Prashant Luna RN as Nurse Navigator    Patient Active Problem List   Diagnosis Code    Chronic hepatitis B ([facility] B18.1    Liver mass, right lobe R16.0    Pulmonary nodule R91.1    Hepatocellular carcinoma ([address] C22.0    Liver hematoma, infected S36.112A       Eliseo Wang returns to the [address] for management of [address] due to chronic HBV. The active problem list, all pertinent past medical history, medications, radiologic findings and laboratory findings related to the liver disorder were reviewed with the patient. The patient is a 60 y.o.  male from [place] who was first noted to have abnormalities in liver transaminases in 3/2016. Serologic studies demonstrated he was positive for HBV and ASMA. His ALT and HBV DNA were elevated and Viread 300 mg daily was started (June 2016). He has tolerated this medication well without new physical complaints and has shown significant suppression of HBV at last office visit. Ultrasound of the liver was performed in 3/2016. The results of the imaging demonstrated a normal appearing liver. There was a 1.8 cm lesion in the right lobe. CT scan of the liver was performed in 5/2016. This demonstrated the liver lesion was a hemangioma. A new pulmonary 2 cm nodule was observed.   Patient had routine interval US of liver in 11/2016 which demonstrated interval increase of size of lesion. CT scan of 11/2016 confirmed increase in size and characteristics of enhancement and washout concerning for malignancy. Given the patient has no underlying cirrhosis, resection of this lesion was thought to be best approach. He underwent resection of the segment 8 lesion in early 1/2017 and tolerated this surgery well. Pathology confirmed moderately differentiated [address] with clear margins in the setting of bridging fibrosis. Patient has continued to have \"pulling sensation\" and pain inferior to the incision site, especially with deep breath/coughing and in particular at rest.  He was seen in follow-up with surgery and no new intervention was initiated. Patient had been taking pain medication on a daily basis in order to sleep but discontinued this as of approximately 6 months ago. He has built up a tolerance to this discomfort and is doing reasonably well at this time. He has no new complaints at this time. He continues to take his Viread without issues and no new physical complaints at this time. Despite ongoing abdominal pain, the patient completes all daily activities without any functional limitations. The patient has not experienced fatigue, problems concentrating, swelling of the abdomen, swelling of the lower extremities, hematemesis, hematochezia. ALLERGIES  Allergies   Allergen Reactions    Venom-Honey Bee Anaphylaxis       MEDICATIONS  Current Outpatient Prescriptions   Medication Sig    HYDROmorphone (DILAUDID) 2 mg tablet One every 6 to 8 hours as needed for pain    tenofovir (VIREAD) 300 mg tablet Take 1 Tab by mouth daily.  oxyCODONE-acetaminophen (PERCOCET) 5-325 mg per tablet Take 1-2 Tabs by mouth every four (4) hours as needed. Max Daily Amount: 12 Tabs.  polyethylene glycol (MIRALAX) 17 gram packet Take 1 Packet by mouth daily as needed.  For constipation     No current facility-administered medications for this visit. SYSTEM REVIEW NOT RELATED TO LIVER DISEASE OR REVIEWED ABOVE:  Constitution systems: Negative for fever, chills, weight gain, weight loss. Eyes: Negative for visual changes. ENT: Negative for sore throat, painful swallowing. Respiratory: Negative for cough, hemoptysis, SOB. Cardiology: Negative for chest pain, palpitations. GI:  Negative for constipation or diarrhea. Some sharp/pulling pain inferior to incision, persists ~12 month post-op. : Negative for urinary frequency, dysuria, hematuria, nocturia. Skin: Negative for rash. Diminished sensitivity of the skin overlying the right anterior thigh since surgery. Hematology: Negative for easy bruising, blood clots. Musculo-skeletal: Negative for back pain, muscle pain, weakness. Neurologic: Negative for headaches, dizziness, vertigo, memory problems not related to HE. Psychology: Negative for anxiety, depression. FAMILY HISTORY:  The father  of lung cancer. The patient has no knowledge of the mother's medical condition. There is no family history of liver disease. SOCIAL HISTORY:  The patient is . The patient has 5 children, and 3 grandchildren. The patient currently smokes less than half pack of tobacco daily. He is trying to cut back on cigarettes in general.  The patient has previously consumed alcohol in excess. He now consumes alcoholic beverages on weekends. This represents a significant reduction for him. The patient currently works full time as a . PHYSICAL EXAMINATION:  Visit Vitals    BP (!) 181/98 (BP 1 Location: Left arm, BP Patient Position: Sitting)    Pulse 62    Temp 98 °F (36.7 °C) (Tympanic)    Wt 147 lb (66.7 kg)    SpO2 96%    BMI 23.73 kg/m2   Manual recheck of /88  General: No acute distress. Eyes: Sclera anicteric. ENT: No oral lesions. Thyroid normal.  Nodes: No adenopathy.    Skin: No spider angiomata. No jaundice. No palmar erythema. Respiratory: Lungs clear to auscultation. Cardiovascular: Regular heart rate. No murmurs. No JVD. Abdomen: 9-10 cm RUQ incisional scar, well-approximated without keloid or hernia. No surrounding erythema. Tender with palpation along the inferior border of the liver, which is prominent. Small, reducible umbilical hernia. Spleen not palpable. No obvious ascites. Extremities: No edema. No muscle wasting. No gross arthritic changes. Neurologic: Alert and oriented. Cranial nerves grossly intact. No asterixis. LABORATORY STUDIES:  [place] [address] Units 9/28/2017 7/31/2017 5/2/2017   WBC 3.4 - 10.8 x10E3/uL 7.7 5.7 5.3   ANC 1.8 - 8.0 K/UL  2.7    HGB 12.6 - 17.7 g/dL 15.5 17.0 13.4    - 379 x10E3/uL 213 211 174   INR <1.2    1.0    AST 0 - 40 IU/L 39 41 (H) 26   ALT 0 - 44 IU/L 24 32 16   Alk Phos 39 - 117 IU/L 71 101 84   Bili, Total 0.0 - 1.2 mg/dL 0.3 0.5 0.2   Bili, Direct 0.00 - 0.40 mg/dL 0.14  0.07   Albumin 3.5 - 5.5 g/dL 4.8 4.3 4.4   BUN 6 - 24 mg/dL 10 6 12   Creat 0.76 - 1.27 mg/dL 1.18 0.94 0.91   Na 134 - 144 mmol/L 141 131 (L) 139   K 3.5 - 5.2 mmol/L 5.6 (H) 3.9 4.4   Cl 96 - 106 mmol/L 99 97 101   CO2 18 - 29 mmol/L 28 28 23   Glucose 65 - 99 mg/dL 91 98 90   Magnesium 1.6 - 2.4 mg/dL        Cancer Screening Latest Ref Rng & Units 9/28/2017 5/2/2017 2/10/2017   AFP, Serum 0.0 - 8.0 ng/mL 2.0 1.4 1.4   AFP-L3% 0.0 - 9.9 % Comment Comment Comment     Virology Latest Ref Rng & Units 9/28/2017 5/2/2017 2/10/2017   HBV DNA IU/mL <10 <10 <10   Additional lab values drawn at today's office visit are pending at the time of documentation.     SEROLOGIES:  Serologies Latest Ref Rng 6/10/2016 4/22/2016   Hep A Ab, Total Negative  Positive (A)   Hep B Surface Ag Negative  Positive (A)   Hep B Core Ab, Total Negative  Positive (A)   Hep B Surface AB QL   Non Reactive   Hep C Ab 0.0 - 0.9 s/co ratio  0.1   Hep D AB Not Detected Not Detected    Ferritin 30 - 400 ng/mL  359   Iron % Saturation 15 - 55 %  76 (HH)   AKIN, IFA   Negative   ASMCA 0 - 19 Units  83 (H)   Alpha-1 antitrypsin level 90 - 200 mg/dL  176   6/2016. HIV Ab is negative. HBeAg is negative, HBeAb is positive. LIVER HISTOLOGY:  6/2016. [facility] at [facility]. EkPa was 11.8. IQR/med 26%. The results suggested a fibrosis level of F3.  1/2017. Liver resection. 2.2 cm HCC, moderately differentiated with free margins. Adjacent liver shows chronic hepatitis with bridging fibrosis (grade 2, stage 3)    ENDOSCOPIC PROCEDURES:  Not available or performed    RADIOLOGY:  3/2016. Ultrasound of liver. Normal appearing liver. Single hyperecholic mass in the right lobe measuring 1.5 cm. Consistent with hemangioma. 5/2016. CT scan abdomen with and without IV contrast. 1.9 cm hemangioma in the right hepatic lobe. No additional liver lesion. 11/2016. Ultrasound of liver. Increase in size of the right lobe hepatic mass. CT scan or MRI recommended. 11/2016. CT scan abdomen. Interval enlargement of hypervascular segment 8 liverlesion now 2.5 x 1.8 x 3.1 cm. In the setting of hepatitis B, despite lack of CT evidence of cirrhosis, hepatocellular carcinoma is more likely than an unusual presentation of a benign lesion such as atypical hemangioma. 3/2017. CT of abdomen. No residual enhancing tumor  Gas within the right lobe resection site is unexpected 2 months postoperatively. Surgical packing material, abscess, and less likely retained postoperative gas, are possible. 4/2017. CT abdomen. No CT evidence of locally recurrent hepatocellular carcinoma, metastatic disease, or new lesions within cirrhotic appearing liver. Resolution of fluid collection. OTHER TESTING:  Not available or performed    ASSESSMENT AND PLAN:  Chronic HBV with bridging fibrosis. Liver transaminases normalized following resection for [address]  No evidence of local recurrence at the site of resection on 4/2017 imaging. Our plan has been to continue with imaging studies every 3 months through one year post-resection. He is overdue for imaging and this has been ordered again. Prior requests had not been scheduled. Patient has phone number to contact radiology scheduling if he doesn't hear from scheduling in the next week. Patient is also due for repeat imaging of the chest to follow-up on a pulmonary nodule. After this assessment, will continue with imaging studies every 6 months for the next 5 years. Continued post-operative pain. Apparent resolution of fluid collection. Patient reports no change in pattern and is able to tolerate the current level. Will reassess on upcoming imaging studies. Based upon laboratory studies and imaging the patient does not appear to have cirrhosis - recent surgical liver biopsy demonstrates bridging fibosis. Will monitor these values, tumor marker and HBV levels in response to recent surgical resection of the liver. HBV treatment. Currently on Viread 300 mg daily with excellent tolerance. He is responding well to this treatment with a significant decline is his HBV DNA to undetected levels as of 2/2017. He will continue this long term unless patient shows future evidence of HBV Surface Ag loss and gain of surface antibody. The patient has been shown to have a small 2 mm pulmonary nodule on the CT scan in 11/2016. Will continue to monitor, this may require dedicated study if increase in size or change in characteristics. Will include CT chest on upcoming study to evaluated pulmonary nodule and rule out liver mets. The patient was directed to continue all current medications at the current dosages. There are no contraindications for the patient to take any medications that are necessary for treatment of other medical issues. The patient was advised to remain abstinent from alcohol as noted above.     Vaccination for viral hepatitis A is not needed. The patient has serologic evidence of prior exposure or vaccination with immunity. All of the above issues were discussed with the patient. All questions were answered. The patient expressed a clear understanding of the above. [address] [address] in 6 months, CT of the abdomen and chest in the meantime.     Rere Cortes PA-C  [place] [address], [address] [address]  22.  [phone]

## 2018-01-29 NOTE — MR AVS SNAPSHOT
[address] [facility] [phone] [address] [place] [address] 
[phone] Patient: Paige Guzmán MRN: [id number] KXQ:[id number]/[id number] Visit Information Date & Time Provider Department Dept. Phone Encounter #  
 1/29/2018  2:00 PM Rosabla Whitmore, [address] [facility] [address] [place] [address] [id number] Follow-up Instructions Return in about 6 months (around 7/29/2018) for Kati Finley. Upcoming Health Maintenance Date Due Pneumococcal 19-64 Highest Risk (1 of 3 - PCV13) 1/2/1978 DTaP/Tdap/Td series (1 - Tdap) 1/2/1980 FOBT Q 1 YEAR AGE 50-75 1/2/2009 Influenza Age 5 to Adult 8/1/2017 Allergies as of 1/29/2018  Review Complete On: 1/29/2018 By: Pat Heart Severity Noted Reaction Type Reactions Venom-honey Bee High 04/08/2014    Anaphylaxis Current Immunizations  Reviewed on 1/9/2017 No immunizations on file. Not reviewed this visit You Were Diagnosed With   
  
 Codes Comments Hepatocellular carcinoma ([facility]    -  Primary ICD-10-CM: C22.0 ICD-9-CM: 155.0 Chronic hepatitis B ([facility]     ICD-10-CM: B18.1 ICD-9-CM: 070.32 Liver mass, right lobe     ICD-10-CM: R16.0 ICD-9-CM: 573.9 Vitals BP Pulse Temp Weight(growth percentile) SpO2 BMI  
 (!) 181/98 (BP 1 Location: Left arm, BP Patient Position: Sitting) 62 98 °F (36.7 °C) (Tympanic) 147 lb (66.7 kg) 96% 23.73 kg/m2 Smoking Status Current Every Day Smoker BMI and BSA Data Body Mass Index Body Surface Area  
 23.73 kg/m 2 1.76 m 2 Preferred Pharmacy Pharmacy Name Phone [facility], [address] [place] [address] Street  Your Updated Medication List  
  
   
This list is accurate as of: 1/29/18  2:33 PM.  Always use your most recent med list.  
  
  
  
  
 HYDROmorphone 2 mg tablet Commonly known as:  DILAUDID One every 6 to 8 hours as needed for pain oxyCODONE-acetaminophen 5-325 mg per tablet Commonly known as:  PERCOCET Take 1-2 Tabs by mouth every four (4) hours as needed. Max Daily Amount: 12 Tabs. polyethylene glycol 17 gram packet Commonly known as:  Liz Session Take 1 Packet by mouth daily as needed. For constipation  
  
 tenofovir DISOPROXIL FUMARATE 300 mg tablet Commonly known as:  Chilo Nath Take 1 Tab by mouth daily. Prescriptions Sent to Pharmacy Refills  
 tenofovir DISOPROXIL FUMARATE (VIREAD) 300 mg tablet 11 Sig: Take 1 Tab by mouth daily. Class: Normal  
 Pharmacy: [facility] [address] Ph #: [phone] Route: Oral  
  
We Performed the Following AFP WITH AFP-L3% [[id number] Custom] CBC W/O DIFF [20999 CPT(R)] HEPATIC FUNCTION PANEL (6) [[id number] Custom] HEPATITIS B, QT, PCR [71854 CPT(R)] METABOLIC PANEL, BASIC [40487 CPT(R)] PROTHROMBIN TIME + INR [31191 CPT(R)] Follow-up Instructions Return in about 6 months (around 7/29/2018) for Paolo Obrien. To-Do List   
 02/08/2018 Imaging:  CT CHEST W CONT AND ABD W WO CONT Referral Information Referral ID Referred By Referred To  
  
 [id number] Marbella RAMIREZ Not Available Visits Status Start Date End Date 1 New Request 1/29/18 1/29/19 If your referral has a status of pending review or denied, additional information will be sent to support the outcome of this decision. Introducing [facility]! [employer] introduces Ark patient portal. Now you can access parts of your medical record, email your doctor's office, and request medication refills online. 1. In your internet browser, go to [email or web address] 2. Click on the First Time User? Click Here link in the Sign In box. You will see the New Member Sign Up page. 3. Enter your Ark Access Code exactly as it appears below.  You will not need to use this code after youve completed the sign-up process. If you do not sign up before the expiration date, you must request a new code. · Nexvet Access Code: [email or web address] Expires: 4/29/2018  2:33 PM 
 
4. Enter the last four digits of your Social Security Number (xxxx) and Date of Birth (mm/dd/yyyy) as indicated and click Submit. You will be taken to the next sign-up page. 5. Create a Nexvet ID. This will be your Nexvet login ID and cannot be changed, so think of one that is secure and easy to remember. 6. Create a Nexvet password. You can change your password at any time. 7. Enter your Password Reset Question and Answer. This can be used at a later time if you forget your password. 8. Enter your e-mail address. You will receive e-mail notification when new information is available in [address] 9. Click Sign Up. You can now view and download portions of your medical record. 10. Click the Download Summary menu link to download a portable copy of your medical information. If you have questions, please visit the Frequently Asked Questions section of the Nexvet website. Remember, Nexvet is NOT to be used for urgent needs. For medical emergencies, dial 911. Now available from your iPhone and Android! Please provide this summary of care documentation to your next provider. Your primary care clinician is listed as Latesha Sy. If you have any questions after today's visit, please call [phone].

## 2018-01-29 NOTE — PROGRESS NOTES
1. Have you been to the ER, urgent care clinic since your last visit? Hospitalized since your last visit? No    2. Have you seen or consulted any other health care providers outside of the [address] [place] [address] since your last visit? Include any pap smears or colon screening.  No   Chief Complaint   Patient presents with    Follow-up     Visit Vitals    BP (!) 181/98 (BP 1 Location: Left arm, BP Patient Position: Sitting)    Pulse 62    Temp 98 °F (36.7 °C) (Tympanic)    Wt 147 lb (66.7 kg)    SpO2 96%    BMI 23.73 kg/m2     PHQ over the last two weeks 1/29/2018   Little interest or pleasure in doing things Not at all   Feeling down, depressed or hopeless Not at all   Total Score PHQ 2 0     Learning Assessment 1/29/2018   PRIMARY LEARNER Patient   HIGHEST LEVEL OF EDUCATION - PRIMARY LEARNER  -   BARRIERS PRIMARY LEARNER -   Marta [address] LEVEL OF EDUCATION -   [address] -    NEED -   LEARNER PREFERENCE PRIMARY LISTENING   LEARNER Mehdi 11 -   ANSWERED BY patient    RELATIONSHIP SELF   ASSESSMENT COMMENT -

## 2018-01-30 LAB
AFP L3 MFR SERPL: 58.6 % (ref 0–9.9)
AFP SERPL-MCNC: 6.4 NG/ML (ref 0–8)
ALBUMIN SERPL-MCNC: 4.6 G/DL (ref 3.5–5.5)
ALP SERPL-CCNC: 68 IU/L (ref 39–117)
ALT SERPL-CCNC: 21 IU/L (ref 0–44)
AST SERPL-CCNC: 43 IU/L (ref 0–40)
BILIRUB DIRECT SERPL-MCNC: 0.14 MG/DL (ref 0–0.4)
BILIRUB SERPL-MCNC: 0.3 MG/DL (ref 0–1.2)
BUN SERPL-MCNC: 10 MG/DL (ref 6–24)
BUN/CREAT SERPL: 11 (ref 9–20)
CALCIUM SERPL-MCNC: 9.1 MG/DL (ref 8.7–10.2)
CHLORIDE SERPL-SCNC: 95 MMOL/L (ref 96–106)
CO2 SERPL-SCNC: 24 MMOL/L (ref 18–29)
CREAT SERPL-MCNC: 0.89 MG/DL (ref 0.76–1.27)
ERYTHROCYTE [DISTWIDTH] IN BLOOD BY AUTOMATED COUNT: 14.4 % (ref 12.3–15.4)
GFR SERPLBLD CREATININE-BSD FMLA CKD-EPI: 108 ML/MIN/1.73
GFR SERPLBLD CREATININE-BSD FMLA CKD-EPI: 94 ML/MIN/1.73
GLUCOSE SERPL-MCNC: 95 MG/DL (ref 65–99)
HBV DNA SERPL NAA+PROBE-ACNC: <10 IU/ML
HBV DNA SERPL NAA+PROBE-LOG IU: NORMAL LOG10IU/ML
HCT VFR BLD AUTO: 43.6 % (ref 37.5–51)
HGB BLD-MCNC: 15 G/DL (ref 13–17.7)
INR PPP: 1.1 (ref 0.8–1.2)
MCH RBC QN AUTO: 34 PG (ref 26.6–33)
MCHC RBC AUTO-ENTMCNC: 34.4 G/DL (ref 31.5–35.7)
MCV RBC AUTO: 99 FL (ref 79–97)
PLATELET # BLD AUTO: 237 X10E3/UL (ref 150–379)
POTASSIUM SERPL-SCNC: 4.8 MMOL/L (ref 3.5–5.2)
PROTHROMBIN TIME: 11.2 SEC (ref 9.1–12)
RBC # BLD AUTO: 4.41 X10E6/UL (ref 4.14–5.8)
REF LAB TEST REF RANGE: NORMAL
SODIUM SERPL-SCNC: 137 MMOL/L (ref 134–144)
WBC # BLD AUTO: 7.1 X10E3/UL (ref 3.4–10.8)

## 2018-01-31 DIAGNOSIS — C22.0 HEPATOCELLULAR CARCINOMA (HCC): Primary | ICD-10-CM

## 2018-02-12 ENCOUNTER — HOSPITAL ENCOUNTER (OUTPATIENT)
Dept: CT IMAGING | Age: 58
Discharge: HOME | End: 2018-02-12
Attending: PHYSICIAN ASSISTANT
Payer: COMMERCIAL

## 2018-02-12 DIAGNOSIS — C22.0 HEPATOCELLULAR CARCINOMA (HCC): ICD-10-CM

## 2018-02-12 PROCEDURE — 74011000258 HC RX REV CODE- 258: Performed by: PHYSICIAN ASSISTANT

## 2018-02-12 PROCEDURE — 71260 CT THORAX DX C+: CPT

## 2018-02-12 PROCEDURE — 74170 CT ABD WO CNTRST FLWD CNTRST: CPT

## 2018-02-12 PROCEDURE — 74011636320 HC RX REV CODE- 636/320: Performed by: PHYSICIAN ASSISTANT

## 2018-02-12 RX ORDER — SODIUM CHLORIDE 9 MG/ML
50 INJECTION, SOLUTION INTRAVENOUS
Status: COMPLETED | OUTPATIENT
Start: 2018-02-12 | End: 2018-02-12

## 2018-02-12 RX ADMIN — SODIUM CHLORIDE 50 ML/HR: 900 INJECTION, SOLUTION INTRAVENOUS at 09:28

## 2018-02-12 RX ADMIN — IOPAMIDOL 100 ML: 612 INJECTION, SOLUTION INTRAVENOUS at 09:28

## 2018-02-16 ENCOUNTER — OFFICE VISIT (OUTPATIENT)
Dept: HEMATOLOGY | Age: 58
End: 2018-02-16

## 2018-02-16 VITALS
HEART RATE: 59 BPM | DIASTOLIC BLOOD PRESSURE: 87 MMHG | OXYGEN SATURATION: 97 % | WEIGHT: 148.8 LBS | TEMPERATURE: 98.7 F | SYSTOLIC BLOOD PRESSURE: 188 MMHG | HEIGHT: 66 IN | BODY MASS INDEX: 23.91 KG/M2

## 2018-02-16 DIAGNOSIS — Z90.49 H/O RESECTION OF LIVER: ICD-10-CM

## 2018-02-16 NOTE — PROGRESS NOTES
Chief Complaint   Patient presents with    Follow-up     Visit Vitals    /87 (BP 1 Location: Left arm, BP Patient Position: Sitting)    Pulse (!) 59    Temp 98.7 °F (37.1 °C) (Tympanic)    Ht 5' 6\" (1.676 m)    Wt 148 lb 12.8 oz (67.5 kg)    SpO2 97%    BMI 24.02 kg/m2     PHQ over the last two weeks 2/16/2018   Little interest or pleasure in doing things Not at all   Feeling down, depressed or hopeless Not at all   Total Score PHQ 2 0

## 2018-02-17 NOTE — PROGRESS NOTES
70 Jamil Hanna MD, [address], [facility], [place]       Gabriella Marie, MORALES Jack, [facility]   MAICOL Baxter NP [address] [facility] 136    at [address]    [address], [address] [facility], [address]    [phone]    FAX: [address] [place] [address] Avenue    [address], [address], [address]    [phone]    FAX: [phone]         Patient Care Team:  Esther Barrow MD as PCP - General (Family Practice)  Mary Saba MD (Cardiology)  Edilma Chong MD (General Surgery)  Renee Campos, DUTCH as Nurse Navigator      Patient Active Problem List   Diagnosis Code    Chronic hepatitis B ([address] B18.1    Liver mass, right lobe R16.0    Pulmonary nodule R91.1    Hepatocellular carcinoma ([address] C22.0    Liver hematoma, infected G00.553D    H/O resection of liver Z90.49       Donnie Albarran returns to the [address] for management of [address] and chronic HBV. The active problem list, all pertinent past medical history, medications, radiologic findings and laboratory findings related to the liver disorder were reviewed with the patient. The patient is a 60 y.o.  male from [place] who was first noted to have abnormalities in liver transaminases in 3/2016. Serologic studies demonstrated he was positive for HBV and ASMA. Assessment of liver fibrosis with Fibroscan and surgical resection suggested stage 3 bridging fibrosis. He was started on Viread in 6/2016. The last HCV RNA was undetectable. He developed [address] in segment 8, right lobe in 11/2016. This was treated with surgical resection in 1/2017.     He has undergone regular  survalance for [address] following surgical resection. A CT scan in 2/2018 demonstrated a new 1.1 cm lesion in segment 7, right lobe not adjacent to the previous resection site. The patient comes into the office today with 2 of his sons to discuss options for treatment. The patient notes persistent pain in the right side over the liver since the liver resction,     The patient has not experienced fatigue,     The patient completes all daily activities without any functional limitations. ALLERGIES  Allergies   Allergen Reactions    Venom-Honey Bee Anaphylaxis       MEDICATIONS  Current Outpatient Prescriptions   Medication Sig    tenofovir DISOPROXIL FUMARATE (VIREAD) 300 mg tablet Take 1 Tab by mouth daily.  HYDROmorphone (DILAUDID) 2 mg tablet One every 6 to 8 hours as needed for pain    oxyCODONE-acetaminophen (PERCOCET) 5-325 mg per tablet Take 1-2 Tabs by mouth every four (4) hours as needed. Max Daily Amount: 12 Tabs.  polyethylene glycol (MIRALAX) 17 gram packet Take 1 Packet by mouth daily as needed. For constipation     No current facility-administered medications for this visit. SYSTEM REVIEW NOT RELATED TO LIVER DISEASE OR REVIEWED ABOVE:  Constitution systems: Negative for fever, chills, weight gain, weight loss. Eyes: Negative for visual changes. ENT: Negative for sore throat, painful swallowing. Respiratory: Negative for cough, hemoptysis, SOB. Cardiology: Negative for chest pain, palpitations. GI:  Negative for constipation or diarrhea. Some sharp/pulling pain inferior to incision, persists ~12 month post-op. : Negative for urinary frequency, dysuria, hematuria, nocturia. Skin: Negative for rash. Diminished sensitivity of the skin overlying the right anterior thigh since surgery. Hematology: Negative for easy bruising, blood clots. Musculo-skeletal: Negative for back pain, muscle pain, weakness.   Neurologic: Negative for headaches, dizziness, vertigo, memory problems not related to HE.  Psychology: Negative for anxiety, depression. FAMILY HISTORY:  The father  of lung cancer. The patient has no knowledge of the mother's medical condition. There is no family history of liver disease. SOCIAL HISTORY:  The patient is . The patient has 5 children, and 3 grandchildren. The patient currently smokes less than half pack of tobacco daily. He is trying to cut back on cigarettes in general.  The patient has previously consumed alcohol in excess. He now consumes alcoholic beverages on weekends. This represents a significant reduction for him. The patient currently works full time as a . PHYSICAL EXAMINATION:  Visit Vitals    /87 (BP 1 Location: Left arm, BP Patient Position: Sitting)    Pulse (!) 59    Temp 98.7 °F (37.1 °C) (Tympanic)    Ht 5' 6\" (1.676 m)    Wt 148 lb 12.8 oz (67.5 kg)    SpO2 97%    BMI 24.02 kg/m2     General: No acute distress. Eyes: Sclera anicteric. ENT: No oral lesions. Thyroid normal.  Nodes: No adenopathy. Skin: No spider angiomata. No jaundice. No palmar erythema. Respiratory: Lungs clear to auscultation. Cardiovascular: Regular heart rate. No murmurs. No JVD. Abdomen: 9-10 cm RUQ incisional scar, well-approximated without keloid or hernia. No surrounding erythema. Tender with palpation along the inferior border of the liver, which is prominent. Small, reducible umbilical hernia. Spleen not palpable. No obvious ascites. Extremities: No edema. No muscle wasting. No gross arthritic changes. Neurologic: Alert and oriented. Cranial nerves grossly intact. No asterixis.     LABORATORY STUDIES:  Liver [place] of [address] Units 2018   WBC 3.4 - 10.8 x10E3/uL 7.1 7.7   ANC 1.8 - 8.0 K/UL     HGB 13.0 - 17.7 g/dL 15.0 15.5    - 379 x10E3/uL 237 213   INR 0.8 - 1.2 1.1    AST 0 - 40 IU/L 43 (H) 39   ALT 0 - 44 IU/L 21 24   Alk Phos 39 - 117 IU/L 68 71   Bili, Total 0.0 - 1.2 mg/dL 0.3 0.3   Bili, Direct 0.00 - 0.40 mg/dL 0.14 0.14   Albumin 3.5 - 5.5 g/dL 4.6 4.8   BUN 6 - 24 mg/dL 10 10   Creat 0.76 - 1.27 mg/dL 0.89 1.18   Na 134 - 144 mmol/L 137 141   K 3.5 - 5.2 mmol/L 4.8 5.6 (H)   Cl 96 - 106 mmol/L 95 (L) 99   CO2 18 - 29 mmol/L 24 28   Glucose 65 - 99 mg/dL 95 91   Magnesium 1.6 - 2.4 mg/dL       Cancer Screening AFP, Serum AFP-L3%   Latest Ref Rng & Units 0.0 - 8.0 ng/mL 0.0 - 9.9 %   1/29/2018 6.4 58.6 (H)   9/28/2017 2.0 Comment   5/2/2017 1.4 Comment   2/10/2017 1.4 Comment   4/22/2016 17.2 (H) 8.9     Virology HBV DNA   Latest Ref Rng & Units IU/mL   1/29/2018 <10   9/28/2017 <10   5/2/2017 <10   2/10/2017 <10     SEROLOGIES:  Serologies Latest Ref Rng 6/10/2016 4/22/2016   Hep A Ab, Total Negative  Positive (A)   Hep B Surface Ag Negative  Positive (A)   Hep B Core Ab, Total Negative  Positive (A)   Hep B Surface AB QL   Non Reactive   Hep C Ab 0.0 - 0.9 s/co ratio  0.1   Hep D AB Not Detected Not Detected    Ferritin 30 - 400 ng/mL  359   Iron % Saturation 15 - 55 %  76 (HH)   AKIN, IFA   Negative   ASMCA 0 - 19 Units  83 (H)   Alpha-1 antitrypsin level 90 - 200 mg/dL  176     6/2016. HIV Ab negative. HBeAg negative, HBeAb positive. LIVER HISTOLOGY:  6[facility]2016. [facility] EkPa was 11.8. IQR/med 26%. The results suggested a fibrosis level of F3.  1/2017. Liver resection. 2.2 cm HCC, moderately differentiated with free margins. Adjacent liver shows chronic hepatitis with bridging fibrosis (grade 2, stage 3)    ENDOSCOPIC PROCEDURES:  Not available or performed    RADIOLOGY:  3/2016. Ultrasound of liver. Normal appearing liver. Single hyperecholic mass in the right lobe measuring 1.5 cm. Consistent with hemangioma. 5/2016. CT scan abdomen with and without IV contrast. 1.9 cm hemangioma in the right hepatic lobe. No additional liver lesion. 11/2016. Ultrasound of liver.   Increase in size of the right lobe hepatic mass. CT scan or MRI recommended. 11/2016. CT scan abdomen. Interval enlargement of hypervascular segment 8 liverlesion now 2.5 x 1.8 x 3.1 cm. In the setting of hepatitis B, despite lack of CT evidence of cirrhosis, hepatocellular carcinoma is more likely than an unusual presentation of a benign lesion such as atypical hemangioma. 3/2017. CT of abdomen. No residual enhancing tumor  Gas within the right lobe resection site is unexpected 2 months postoperatively. Surgical packing material, abscess, and less likely retained postoperative gas, are possible. 4/2017. CT abdomen. No CT evidence of locally recurrent hepatocellular carcinoma, metastatic disease, or new lesions within cirrhotic appearing liver. Resolution of fluid collection. 2/2018. CT scan abdomen with and without IV contrast.  Changes consistent with chronic liver disease. New 1.1 cm enhancing liver mass with washout and rim enhancement in segment 7, right lobe. No dilated bile ducts. No ascites. OTHER TESTING:  Not available or performed    ASSESSMENT AND PLAN:  Chronic HBV with bridging fibrosis. Liver function is normal.  The platelet count is normal.    Chronic E-antigen negative HBV with undetectable HBV DNA on Viread. Will continue Viread. [address] treated with surgical resection in 1/2017. Recurrence of [address] in 2/2018. Options for recurrence are ablation or liver transplant. I personally favor liver transplant and believe that he will continue to get new/additional [address] and eventually will have multifocal disease and could not undergo LT. If he agrees to undergo LT we would need to let the [address] grow to greater than 2 cm prior to ablation and LT listing. The possibility of living donor liver transplant was discussed. If he wishes to consider LT he must stop smoking tobacco.    Pulmonary nodules on chest CT have not changed since 11/2016.      The patient was directed to continue all current medications at the current dosages. There are no contraindications for the patient to take any medications that are necessary for treatment of other medical issues. The patient was advised to remain abstinent from alcohol. Vaccination for viral hepatitis A is not needed. The patient has serologic evidence of prior exposure or vaccination with immunity. All of the above issues were discussed with the patient. All questions were answered. The patient expressed a clear understanding of the above. The family will call us next week and let us know in which direction they would like to proceed. [facility] 87 depending upon how they would like to proceed.       Van Harkins MD  Liver [place] [address]  [phone]

## 2018-02-26 ENCOUNTER — PATIENT OUTREACH (OUTPATIENT)
Dept: HEMATOLOGY | Age: 58
End: 2018-02-26

## 2018-02-26 DIAGNOSIS — C22.0 HEPATOCELLULAR CARCINOMA (HCC): Primary | ICD-10-CM

## 2018-02-26 NOTE — PROGRESS NOTES
Scheduled consult with Dr. Anisa Sena. Phone call placed to patient's son, Misti Joyce, to make him aware. Received VM. Left message asking for a return call.

## 2018-02-26 NOTE — PROGRESS NOTES
Received return call from patients son, Daniel Duque. Notified of consult with Dr. Mike Nick scheduled on 3/1. Advised to report to hospital admitting at 11:15 am. Offered to schedule f/u with Marcos Roman PA-C for ~2 weeks after anticipated treatment date. Son reports he will contact NN when procedure date is determined and schedule f/u.

## 2018-03-01 ENCOUNTER — HOSPITAL ENCOUNTER (OUTPATIENT)
Dept: INTERVENTIONAL RADIOLOGY/VASCULAR | Age: 58
Discharge: HOME | End: 2018-03-01
Attending: RADIOLOGY | Admitting: RADIOLOGY

## 2018-03-01 DIAGNOSIS — C22.0 HEPATOCELLULAR CARCINOMA (HCC): ICD-10-CM

## 2018-03-02 DIAGNOSIS — C22.0 HEPATOCELLULAR CARCINOMA (HCC): Primary | ICD-10-CM

## 2018-03-21 ENCOUNTER — HOSPITAL ENCOUNTER (OUTPATIENT)
Dept: PREADMISSION TESTING | Age: 58
Discharge: HOME | End: 2018-03-21
Payer: COMMERCIAL

## 2018-03-21 VITALS
HEART RATE: 55 BPM | WEIGHT: 147.49 LBS | RESPIRATION RATE: 18 BRPM | SYSTOLIC BLOOD PRESSURE: 182 MMHG | OXYGEN SATURATION: 98 % | TEMPERATURE: 98.2 F | BODY MASS INDEX: 23.15 KG/M2 | DIASTOLIC BLOOD PRESSURE: 75 MMHG | HEIGHT: 67 IN

## 2018-03-21 LAB
ANION GAP SERPL CALC-SCNC: 3 MMOL/L (ref 5–15)
APTT PPP: 27.1 SEC (ref 22.1–32)
BUN SERPL-MCNC: 18 MG/DL (ref 6–20)
BUN/CREAT SERPL: 14 (ref 12–20)
CALCIUM SERPL-MCNC: 8.7 MG/DL (ref 8.5–10.1)
CHLORIDE SERPL-SCNC: 106 MMOL/L (ref 97–108)
CO2 SERPL-SCNC: 30 MMOL/L (ref 21–32)
CREAT SERPL-MCNC: 1.26 MG/DL (ref 0.7–1.3)
ERYTHROCYTE [DISTWIDTH] IN BLOOD BY AUTOMATED COUNT: 12.8 % (ref 11.5–14.5)
GLUCOSE SERPL-MCNC: 95 MG/DL (ref 65–100)
HCT VFR BLD AUTO: 43.5 % (ref 36.6–50.3)
HGB BLD-MCNC: 14.8 G/DL (ref 12.1–17)
INR PPP: 1 (ref 0.9–1.1)
MCH RBC QN AUTO: 34 PG (ref 26–34)
MCHC RBC AUTO-ENTMCNC: 34 G/DL (ref 30–36.5)
MCV RBC AUTO: 100 FL (ref 80–99)
NRBC # BLD: 0 K/UL (ref 0–0.01)
NRBC BLD-RTO: 0 PER 100 WBC
PLATELET # BLD AUTO: 197 K/UL (ref 150–400)
PMV BLD AUTO: 9.5 FL (ref 8.9–12.9)
POTASSIUM SERPL-SCNC: 4.8 MMOL/L (ref 3.5–5.1)
PROTHROMBIN TIME: 10.5 SEC (ref 9–11.1)
RBC # BLD AUTO: 4.35 M/UL (ref 4.1–5.7)
SODIUM SERPL-SCNC: 139 MMOL/L (ref 136–145)
THERAPEUTIC RANGE,PTTT: NORMAL SECS (ref 58–77)
WBC # BLD AUTO: 8.3 K/UL (ref 4.1–11.1)

## 2018-03-21 PROCEDURE — 36415 COLL VENOUS BLD VENIPUNCTURE: CPT | Performed by: ANESTHESIOLOGY

## 2018-03-21 PROCEDURE — 85610 PROTHROMBIN TIME: CPT | Performed by: ANESTHESIOLOGY

## 2018-03-21 PROCEDURE — 85027 COMPLETE CBC AUTOMATED: CPT | Performed by: ANESTHESIOLOGY

## 2018-03-21 PROCEDURE — 80048 BASIC METABOLIC PNL TOTAL CA: CPT | Performed by: ANESTHESIOLOGY

## 2018-03-21 PROCEDURE — 93005 ELECTROCARDIOGRAM TRACING: CPT

## 2018-03-21 PROCEDURE — 85730 THROMBOPLASTIN TIME PARTIAL: CPT | Performed by: ANESTHESIOLOGY

## 2018-03-22 LAB
ATRIAL RATE: 53 BPM
CALCULATED P AXIS, ECG09: 63 DEGREES
CALCULATED R AXIS, ECG10: 0 DEGREES
CALCULATED T AXIS, ECG11: 57 DEGREES
DIAGNOSIS, 93000: NORMAL
P-R INTERVAL, ECG05: 170 MS
Q-T INTERVAL, ECG07: 434 MS
QRS DURATION, ECG06: 86 MS
QTC CALCULATION (BEZET), ECG08: 407 MS
VENTRICULAR RATE, ECG03: 53 BPM

## 2018-03-29 ENCOUNTER — ANESTHESIA EVENT (OUTPATIENT)
Dept: CT IMAGING | Age: 58
End: 2018-03-29
Payer: COMMERCIAL

## 2018-03-30 ENCOUNTER — HOSPITAL ENCOUNTER (OUTPATIENT)
Dept: CT IMAGING | Age: 58
Discharge: HOME | End: 2018-03-30
Attending: INTERNAL MEDICINE
Payer: COMMERCIAL

## 2018-03-30 ENCOUNTER — ANESTHESIA (OUTPATIENT)
Dept: CT IMAGING | Age: 58
End: 2018-03-30
Payer: COMMERCIAL

## 2018-03-30 VITALS
HEART RATE: 66 BPM | WEIGHT: 147.49 LBS | OXYGEN SATURATION: 97 % | SYSTOLIC BLOOD PRESSURE: 175 MMHG | HEIGHT: 67 IN | DIASTOLIC BLOOD PRESSURE: 89 MMHG | RESPIRATION RATE: 14 BRPM | TEMPERATURE: 97.9 F | BODY MASS INDEX: 23.15 KG/M2

## 2018-03-30 DIAGNOSIS — C22.0 HEPATOCELLULAR CARCINOMA (HCC): ICD-10-CM

## 2018-03-30 PROCEDURE — 74011250636 HC RX REV CODE- 250/636

## 2018-03-30 PROCEDURE — 74011000250 HC RX REV CODE- 250

## 2018-03-30 PROCEDURE — 74011250636 HC RX REV CODE- 250/636: Performed by: ANESTHESIOLOGY

## 2018-03-30 PROCEDURE — 77013 CT GUIDE FOR TISSUE ABLATION: CPT

## 2018-03-30 PROCEDURE — 77030008684 HC TU ET CUF COVD -B: Performed by: ANESTHESIOLOGY

## 2018-03-30 PROCEDURE — 77030020268 CT ABLATE LIV TUMOR PERC W RF

## 2018-03-30 PROCEDURE — 77030018781

## 2018-03-30 PROCEDURE — 74011250636 HC RX REV CODE- 250/636: Performed by: RADIOLOGY

## 2018-03-30 PROCEDURE — 77030019908 HC STETH ESOPH SIMS -A: Performed by: ANESTHESIOLOGY

## 2018-03-30 PROCEDURE — 77030026438 HC STYL ET INTUB CARD -A: Performed by: ANESTHESIOLOGY

## 2018-03-30 PROCEDURE — 76210000006 HC OR PH I REC 0.5 TO 1 HR

## 2018-03-30 PROCEDURE — 76060000034 HC ANESTHESIA 1.5 TO 2 HR

## 2018-03-30 RX ORDER — DIPHENHYDRAMINE HYDROCHLORIDE 50 MG/ML
12.5 INJECTION, SOLUTION INTRAMUSCULAR; INTRAVENOUS
Status: DISCONTINUED | OUTPATIENT
Start: 2018-03-30 | End: 2018-04-03 | Stop reason: HOSPADM

## 2018-03-30 RX ORDER — SODIUM CHLORIDE, SODIUM LACTATE, POTASSIUM CHLORIDE, CALCIUM CHLORIDE 600; 310; 30; 20 MG/100ML; MG/100ML; MG/100ML; MG/100ML
25 INJECTION, SOLUTION INTRAVENOUS CONTINUOUS
Status: CANCELLED | OUTPATIENT
Start: 2018-03-30 | End: 2018-03-31

## 2018-03-30 RX ORDER — PHENYLEPHRINE HCL IN 0.9% NACL 0.4MG/10ML
SYRINGE (ML) INTRAVENOUS AS NEEDED
Status: DISCONTINUED | OUTPATIENT
Start: 2018-03-30 | End: 2018-03-30 | Stop reason: HOSPADM

## 2018-03-30 RX ORDER — PROPOFOL 10 MG/ML
INJECTION, EMULSION INTRAVENOUS AS NEEDED
Status: DISCONTINUED | OUTPATIENT
Start: 2018-03-30 | End: 2018-03-30 | Stop reason: HOSPADM

## 2018-03-30 RX ORDER — SODIUM CHLORIDE 0.9 % (FLUSH) 0.9 %
5-10 SYRINGE (ML) INJECTION EVERY 8 HOURS
Status: CANCELLED | OUTPATIENT
Start: 2018-03-30

## 2018-03-30 RX ORDER — DEXAMETHASONE SODIUM PHOSPHATE 4 MG/ML
INJECTION, SOLUTION INTRA-ARTICULAR; INTRALESIONAL; INTRAMUSCULAR; INTRAVENOUS; SOFT TISSUE AS NEEDED
Status: DISCONTINUED | OUTPATIENT
Start: 2018-03-30 | End: 2018-03-30 | Stop reason: HOSPADM

## 2018-03-30 RX ORDER — ROCURONIUM BROMIDE 10 MG/ML
INJECTION, SOLUTION INTRAVENOUS AS NEEDED
Status: DISCONTINUED | OUTPATIENT
Start: 2018-03-30 | End: 2018-03-30 | Stop reason: HOSPADM

## 2018-03-30 RX ORDER — SODIUM CHLORIDE 0.9 % (FLUSH) 0.9 %
5-10 SYRINGE (ML) INJECTION AS NEEDED
Status: CANCELLED | OUTPATIENT
Start: 2018-03-30

## 2018-03-30 RX ORDER — LIDOCAINE HYDROCHLORIDE 10 MG/ML
0.1 INJECTION, SOLUTION EPIDURAL; INFILTRATION; INTRACAUDAL; PERINEURAL AS NEEDED
Status: CANCELLED | OUTPATIENT
Start: 2018-03-30

## 2018-03-30 RX ORDER — SODIUM CHLORIDE, SODIUM LACTATE, POTASSIUM CHLORIDE, CALCIUM CHLORIDE 600; 310; 30; 20 MG/100ML; MG/100ML; MG/100ML; MG/100ML
25 INJECTION, SOLUTION INTRAVENOUS CONTINUOUS
Status: DISCONTINUED | OUTPATIENT
Start: 2018-03-30 | End: 2018-04-03 | Stop reason: HOSPADM

## 2018-03-30 RX ORDER — LIDOCAINE HYDROCHLORIDE 10 MG/ML
5 INJECTION, SOLUTION EPIDURAL; INFILTRATION; INTRACAUDAL; PERINEURAL
Status: DISPENSED | OUTPATIENT
Start: 2018-03-30 | End: 2018-03-30

## 2018-03-30 RX ORDER — FENTANYL CITRATE 50 UG/ML
INJECTION, SOLUTION INTRAMUSCULAR; INTRAVENOUS AS NEEDED
Status: DISCONTINUED | OUTPATIENT
Start: 2018-03-30 | End: 2018-03-30 | Stop reason: HOSPADM

## 2018-03-30 RX ORDER — MORPHINE SULFATE 2 MG/ML
2 INJECTION, SOLUTION INTRAMUSCULAR; INTRAVENOUS
Status: DISCONTINUED | OUTPATIENT
Start: 2018-03-30 | End: 2018-04-03 | Stop reason: HOSPADM

## 2018-03-30 RX ORDER — SODIUM CHLORIDE 9 MG/ML
25 INJECTION, SOLUTION INTRAVENOUS CONTINUOUS
Status: DISCONTINUED | OUTPATIENT
Start: 2018-03-30 | End: 2018-04-03 | Stop reason: HOSPADM

## 2018-03-30 RX ORDER — SUCCINYLCHOLINE CHLORIDE 20 MG/ML
INJECTION INTRAMUSCULAR; INTRAVENOUS AS NEEDED
Status: DISCONTINUED | OUTPATIENT
Start: 2018-03-30 | End: 2018-03-30 | Stop reason: HOSPADM

## 2018-03-30 RX ORDER — FENTANYL CITRATE 50 UG/ML
25 INJECTION, SOLUTION INTRAMUSCULAR; INTRAVENOUS
Status: DISCONTINUED | OUTPATIENT
Start: 2018-03-30 | End: 2018-04-03 | Stop reason: HOSPADM

## 2018-03-30 RX ORDER — MIDAZOLAM HYDROCHLORIDE 1 MG/ML
INJECTION, SOLUTION INTRAMUSCULAR; INTRAVENOUS AS NEEDED
Status: DISCONTINUED | OUTPATIENT
Start: 2018-03-30 | End: 2018-03-30 | Stop reason: HOSPADM

## 2018-03-30 RX ORDER — LIDOCAINE HYDROCHLORIDE 20 MG/ML
INJECTION, SOLUTION EPIDURAL; INFILTRATION; INTRACAUDAL; PERINEURAL AS NEEDED
Status: DISCONTINUED | OUTPATIENT
Start: 2018-03-30 | End: 2018-03-30 | Stop reason: HOSPADM

## 2018-03-30 RX ORDER — HYDRALAZINE HYDROCHLORIDE 20 MG/ML
INJECTION INTRAMUSCULAR; INTRAVENOUS
Status: COMPLETED
Start: 2018-03-30 | End: 2018-03-30

## 2018-03-30 RX ORDER — HYDRALAZINE HYDROCHLORIDE 20 MG/ML
5 INJECTION INTRAMUSCULAR; INTRAVENOUS ONCE
Status: COMPLETED | OUTPATIENT
Start: 2018-03-30 | End: 2018-03-30

## 2018-03-30 RX ORDER — ONDANSETRON 2 MG/ML
4 INJECTION INTRAMUSCULAR; INTRAVENOUS AS NEEDED
Status: DISCONTINUED | OUTPATIENT
Start: 2018-03-30 | End: 2018-04-03 | Stop reason: HOSPADM

## 2018-03-30 RX ORDER — EPHEDRINE SULFATE 50 MG/ML
INJECTION, SOLUTION INTRAVENOUS AS NEEDED
Status: DISCONTINUED | OUTPATIENT
Start: 2018-03-30 | End: 2018-03-30 | Stop reason: HOSPADM

## 2018-03-30 RX ORDER — SODIUM CHLORIDE 0.9 % (FLUSH) 0.9 %
5-10 SYRINGE (ML) INJECTION AS NEEDED
Status: DISCONTINUED | OUTPATIENT
Start: 2018-03-30 | End: 2018-04-03 | Stop reason: HOSPADM

## 2018-03-30 RX ORDER — ONDANSETRON 2 MG/ML
INJECTION INTRAMUSCULAR; INTRAVENOUS AS NEEDED
Status: DISCONTINUED | OUTPATIENT
Start: 2018-03-30 | End: 2018-03-30 | Stop reason: HOSPADM

## 2018-03-30 RX ADMIN — HYDRALAZINE HYDROCHLORIDE 5 MG: 20 INJECTION INTRAMUSCULAR; INTRAVENOUS at 10:16

## 2018-03-30 RX ADMIN — Medication 80 MCG: at 09:20

## 2018-03-30 RX ADMIN — HYDRALAZINE HYDROCHLORIDE 5 MG: 20 INJECTION INTRAMUSCULAR; INTRAVENOUS at 10:37

## 2018-03-30 RX ADMIN — EPHEDRINE SULFATE 5 MG: 50 INJECTION, SOLUTION INTRAVENOUS at 09:20

## 2018-03-30 RX ADMIN — ONDANSETRON 4 MG: 2 INJECTION INTRAMUSCULAR; INTRAVENOUS at 09:20

## 2018-03-30 RX ADMIN — LIDOCAINE HYDROCHLORIDE 60 MG: 20 INJECTION, SOLUTION EPIDURAL; INFILTRATION; INTRACAUDAL; PERINEURAL at 08:37

## 2018-03-30 RX ADMIN — DEXAMETHASONE SODIUM PHOSPHATE 6 MG: 4 INJECTION, SOLUTION INTRA-ARTICULAR; INTRALESIONAL; INTRAMUSCULAR; INTRAVENOUS; SOFT TISSUE at 08:45

## 2018-03-30 RX ADMIN — ROCURONIUM BROMIDE 30 MG: 10 INJECTION, SOLUTION INTRAVENOUS at 08:45

## 2018-03-30 RX ADMIN — SODIUM CHLORIDE: 900 INJECTION, SOLUTION INTRAVENOUS at 09:39

## 2018-03-30 RX ADMIN — Medication 80 MCG: at 08:58

## 2018-03-30 RX ADMIN — FENTANYL CITRATE 50 MCG: 50 INJECTION, SOLUTION INTRAMUSCULAR; INTRAVENOUS at 09:15

## 2018-03-30 RX ADMIN — EPHEDRINE SULFATE 5 MG: 50 INJECTION, SOLUTION INTRAVENOUS at 09:07

## 2018-03-30 RX ADMIN — SODIUM CHLORIDE: 900 INJECTION, SOLUTION INTRAVENOUS at 08:45

## 2018-03-30 RX ADMIN — SODIUM CHLORIDE: 900 INJECTION, SOLUTION INTRAVENOUS at 08:08

## 2018-03-30 RX ADMIN — MIDAZOLAM HYDROCHLORIDE 2 MG: 1 INJECTION, SOLUTION INTRAMUSCULAR; INTRAVENOUS at 08:37

## 2018-03-30 RX ADMIN — Medication 80 MCG: at 09:30

## 2018-03-30 RX ADMIN — Medication 80 MCG: at 09:04

## 2018-03-30 RX ADMIN — ROCURONIUM BROMIDE 10 MG: 10 INJECTION, SOLUTION INTRAVENOUS at 08:37

## 2018-03-30 RX ADMIN — SODIUM CHLORIDE 25 ML/HR: 900 INJECTION, SOLUTION INTRAVENOUS at 07:31

## 2018-03-30 RX ADMIN — PROPOFOL 120 MG: 10 INJECTION, EMULSION INTRAVENOUS at 08:37

## 2018-03-30 RX ADMIN — FENTANYL CITRATE 50 MCG: 50 INJECTION, SOLUTION INTRAMUSCULAR; INTRAVENOUS at 08:37

## 2018-03-30 RX ADMIN — SUCCINYLCHOLINE CHLORIDE 140 MG: 20 INJECTION INTRAMUSCULAR; INTRAVENOUS at 08:37

## 2018-03-30 NOTE — DISCHARGE INSTRUCTIONS
[address]  Special Procedures/Radiology Department       Radiologist:  MD Vidal Colmenares    Date: March 30, 2018    Liver Radio Frequency Ablation Discharge Instructions      You may have an aching pain in the ablation site tonight. Take Tylenol, as directed on the label, for pain. Avoid ibuprofen and aspirin for the next 48 hours as these drugs may cause you to bleed. Resume your previous diet. Rest today. Do not lift anything heavier than a small grocery bag (10 pounds) for the next 5 days. If you experience severe sweating, severe abdominal pain, dizziness, go immediately to the Emergency Room. Pain under the left collarbone is normal.     If you have any questions or concerns, please call [phone] and ask to speak to the nurse on-call.

## 2018-03-30 NOTE — PERIOP NOTES
Handoff Report from Operating Room to PACU    Report received from Ck Keith CRNA and Moises Sahu RN regarding Homero Boehringer. * No surgeons listed *  And * No procedures listed *  confirmed   with allergies and dressings discussed. Anesthesia type, drugs, patient history, complications, estimated blood loss, vital signs, intake and output, and last pain medication, lines, reversal medications and temperature were reviewed.

## 2018-03-30 NOTE — H&P
Radiology History and Physical    Patient: Sammie Shin 60 y.o. male       Chief Complaint: No chief complaint on file. History of Present Illness: small hcc for mw ablation    History:    Past Medical History:   Diagnosis Date    Arthritis     Hepatitis B carrier ([facility]     Hepatoma ([facility] 1/6/2017    Hypertension     PT DENIES    Liver disease     HEPATITIS B    Liver mass, right lobe 4/22/2016    Pulmonary nodule 06/2016    right middle lobe, recheck 11/16 stable at 2 mm, recheck again in 6 mo    Ulnar nerve compression     right     Family History   Problem Relation Age of Onset    Anesth Problems Neg Hx      Social History     Social History    Marital status:      Spouse name: N/A    Number of children: N/A    Years of education: N/A     Occupational History    Not on file. Social History Main Topics    Smoking status: Current Every Day Smoker     Packs/day: 0.50     Years: 20.00     Types: Cigarettes    Smokeless tobacco: Never Used    Alcohol use No      Comment: quit 11/2016    Drug use: No    Sexual activity: Yes     Partners: Female     Other Topics Concern    Not on file     Social History Narrative       Allergies: Allergies   Allergen Reactions    Venom-Honey Bee Anaphylaxis       Current Medications:  Current Outpatient Prescriptions   Medication Sig    tenofovir DISOPROXIL FUMARATE (VIREAD) 300 mg tablet Take 1 Tab by mouth daily. Current Facility-Administered Medications   Medication Dose Route Frequency    iopamidol (ISOVUE-370) 76 % injection 100 mL  100 mL IntraVENous RAD ONCE    lidocaine (PF) (XYLOCAINE) 10 mg/mL (1 %) injection 5 mL  5 mL SubCUTAneous RAD ONCE    iopamidol (ISOVUE-370) 76 % injection        0.9% sodium chloride infusion  25 mL/hr IntraVENous CONTINUOUS        Physical Exam:  Blood pressure 149/73, pulse (!) 56, temperature 97.9 °F (36.6 °C), resp. rate 20, height 5' 7\" (1.702 m), weight 66.9 kg (147 lb 7.8 oz), SpO2 97 %.   LUNG: clear to auscultation bilaterally, HEART: regular rate and rhythm      Alerts:    Hospital Problems  Date Reviewed: 3/30/2018    None          Laboratory:    No results for input(s): HGB, HCT, WBC, PLT, INR, BUN, CREA, K, CRCLT, HGBEXT, HCTEXT, PLTEXT in the last 72 hours. No lab exists for component: PTT, PT, INREXT      Plan of Care/Planned Procedure:  Risks, benefits, and alternatives reviewed with patient and he agrees to proceed with the procedure.        Anabell Hopkins MD

## 2018-03-30 NOTE — ANESTHESIA PREPROCEDURE EVALUATION
Anesthetic History   No history of anesthetic complications            Review of Systems / Medical History  Patient summary reviewed, nursing notes reviewed and pertinent labs reviewed    Pulmonary          Smoker         Neuro/Psych   Within defined limits           Cardiovascular    Hypertension: well controlled              Exercise tolerance: >4 METS     GI/Hepatic/Renal       Hepatitis: type B    Liver disease     Endo/Other        Arthritis     Other Findings   Comments:  Hepatocellular carcinoma    H/O resection of liver           Physical Exam    Airway  Mallampati: II  TM Distance: > 6 cm  Neck ROM: normal range of motion   Mouth opening: Normal     Cardiovascular  Regular rate and rhythm,  S1 and S2 normal,  no murmur, click, rub, or gallop  Rhythm: regular  Rate: normal         Dental    Dentition: Edentulous     Pulmonary  Breath sounds clear to auscultation               Abdominal  GI exam deferred       Other Findings            Anesthetic Plan    ASA: 2  Anesthesia type: general    Monitoring Plan: BIS      Induction: Intravenous  Anesthetic plan and risks discussed with: Patient and Son / Daughter      Previous easy intubation (with 50 mg Rocuronium) for liver resection.

## 2018-03-30 NOTE — PERIOP NOTES
1015: Spoke with Dr David Rodriguez in reference to elevated blood pressure. Received telephone order for 5mg IV hydralazine x1 dose. 1035: Spoke with Dr David Rodriguez again in reference to elevated blood pressure after hydralazine dose given. Received telephone order for additional 5mg IV hydralazine x1 dose. Denies any complaints at this time. BP in PAT prior to procedure day noted at 182/75.    1048: Report given to [facility], RN in [facility].

## 2018-03-30 NOTE — IP AVS SNAPSHOT
Summary of Care Report The Summary of Care report has been created to help improve care coordination. Users with access to [employer] or [employer] [facility] (Web-based application) may access additional patient information including the Discharge Summary. If you are not currently a [employer] [facility] [address] [facility] user and need more information, please call the number listed below in the [address] section and ask to be connected with Medical Records. Facility Information Name Address Phone [address] [address] [phone] Patient Information Patient Name Sex QUINN Suh ([id number]) Male 1960 Discharge Information Admitting Provider Service Area Unit  
 (none) [facility] / [phone] Discharge Provider Discharge Date/Time Discharge Disposition Destination (none) (none) (none) (none) Patient Language Language ENGLISH [13] Hospital Problems as of 3/30/2018  Reviewed: 3/30/2018  7:28 AM by Claudette Mortimer, MD  
 None Non-Hospital Problems as of 3/30/2018  Reviewed: 3/30/2018  7:28 AM by Claudette Mortimer, MD  
  
  
  
 Class Noted - Resolved Last Modified Active Problems Chronic hepatitis B ([facility]  2016 - Present 6/10/2016 by Linda Ennis MD  
  Entered by Linda Ennis MD  
  Liver mass, right lobe  2016 - Present 2017 by Esthela Sorto MD  
  Entered by Linda Ennis MD  
  Pulmonary nodule  6/10/2016 - Present 6/10/2016 by Linda Ennis MD  
  Entered by Linda Ennis MD  
  Hepatocellular carcinoma [facility])  2017 - Present 2/10/2017 by KEY Mosquera   Entered by Esthela Sorto MD  
  Liver hematoma, infected  3/16/2017 - Present 3/16/2017 by Esthela Sorto MD  
  Entered by Esthela Sorto MD  
 H/O resection of liver  2/16/2018 - Present 2/16/2018 by Laquita Doss MD  
  Entered by Laquita Doss MD  
  Overview Signed 2/16/2018  8:00 PM by Laquita Doss MD  
   For treatment of [address] 2017 You are allergic to the following Allergen Reactions Venom-Honey Bee Anaphylaxis Current Discharge Medication List  
  
ASK your doctor about these medications Dose & Instructions Dispensing Information Comments  
 tenofovir DISOPROXIL FUMARATE 300 mg tablet Commonly known as:  Zaid Dulce Dose:  300 mg Take 1 Tab by mouth daily. Quantity:  28 Tab Refills:  11 Follow-up Information None Discharge Instructions Maira Claytonvasquez [facility] Special Procedures/Radiology Department Radiologist:  MD Rachel Barker Date: March 30, 2018 Liver Radio Frequency Ablation Discharge Instructions You may have an aching pain in the ablation site tonight. Take Tylenol, as directed on the label, for pain. Avoid ibuprofen and aspirin for the next 48 hours as these drugs may cause you to bleed. Resume your previous diet. Rest today. Do not lift anything heavier than a small grocery bag (10 pounds) for the next 5 days. If you experience severe sweating, severe abdominal pain, dizziness, go immediately to the Emergency Room. Pain under the left collarbone is normal.  
 
If you have any questions or concerns, please call [phone] and ask to speak to the nurse on-call. Chart Review Routing History Recipient Method Report Sent By Richar Anand Fax: [phone] Fax [facility] office visit enc summ w/hx Jose Maria Mock LPN [[id number]] 0/04/0446  5:05 PM 02/20/2015 Lilia Grant MD  
Phone: [phone] In [employer] Routed Notes Funmilayo Hurtado MD [[id number]] 3/26/2015  7:32 AM 03/26/2015 Lilia Grant MD  
Phone: [phone] In Basket Note Review Devonte Holman RN [[id number]] 3/9/2016 10:00 AM 03/09/2016 DR. RAIZA OLIVIA Fax: [phone] Fax [facility] IP AMB RESULT REPORT IMAGING Nohelia Restrepo [[id number]] 11/23/2016  3:08 PM 11/22/2016 Carolina Goss NP Phone: [phone] In Basket IP Auto Routed Joseph Man MD [[id number]] 1/9/2017 10:07 AM 01/09/2017 Trevor Jurado MD  
Phone: [phone] In Basket IP Auto Routed Joseph Man MD [[id number]] 1/9/2017 10:07 AM 01/09/2017 Damaris Hodges MD  
Phone: [phone] In Basket IP Auto Routed Joseph Man MD [[id number]] 1/9/2017 10:07 AM 01/09/2017 Funmilayo Longoria MD  
Phone: [phone] In Basket IP Auto Routed Joseph Man MD [[id number]] 1/9/2017 10:07 AM 01/09/2017 Trevor Jurado MD  
Phone: [phone] In [address] Quincy Solares MD [[id number]] 1/12/2017  6:54 AM   
 Damaris Hodges MD  
Phone: [phone] In [address] Quincy Solares MD [[id number]] 1/12/2017  6:54 AM   
 Funmilayo Longoria MD  
Phone: [phone] In [address] Quincy Solares MD [[id number]] 1/12/2017  6:54 AM   
 Trevor Jurado MD  
Phone: [phone] In Basket IP Auto Routed Notes Orville Bradley MD [[id number]] 3/13/2017  9:32 AM 03/13/2017 Trevor Jurado MD  
Phone: [phone] In [address] Quincy Solares MD [[id number]] 4/19/2017  1:44 PM   
 Funmilayo Longoria MD  
Phone: [phone] In [address] Quincy Solares MD [[id number]] 4/19/2017  1:44 PM   
 Gerome Halsted, PA Phone: [phone]  In [address] Quincy Solares MD [[id number]] 4/19/2017  1:44 PM

## 2018-05-01 ENCOUNTER — OFFICE VISIT (OUTPATIENT)
Dept: FAMILY MEDICINE CLINIC | Age: 58
End: 2018-05-01

## 2018-05-01 VITALS
SYSTOLIC BLOOD PRESSURE: 160 MMHG | DIASTOLIC BLOOD PRESSURE: 83 MMHG | WEIGHT: 147 LBS | RESPIRATION RATE: 18 BRPM | TEMPERATURE: 98.4 F | OXYGEN SATURATION: 98 % | HEIGHT: 67 IN | BODY MASS INDEX: 23.07 KG/M2 | HEART RATE: 57 BPM

## 2018-05-01 DIAGNOSIS — Z72.0 TOBACCO ABUSE: ICD-10-CM

## 2018-05-01 DIAGNOSIS — I83.90 VARICOSE VEIN OF LEG: ICD-10-CM

## 2018-05-01 DIAGNOSIS — Z12.11 SCREENING FOR COLON CANCER: ICD-10-CM

## 2018-05-01 DIAGNOSIS — I10 ESSENTIAL HYPERTENSION: Primary | ICD-10-CM

## 2018-05-01 RX ORDER — HYDROCHLOROTHIAZIDE 25 MG/1
25 TABLET ORAL DAILY
Qty: 30 TAB | Refills: 0 | Status: SHIPPED | OUTPATIENT
Start: 2018-05-01 | End: 2018-05-22 | Stop reason: SDUPTHER

## 2018-05-01 NOTE — PROGRESS NOTES
HISTORY OF PRESENT ILLNESS  Dillon Valentin is a 60 y.o. male. Chief Complaint   Patient presents with    Hypertension     follow up       HPI  Doing well  But last few mo BP staying high  150-160's  On review of chart BP at times even higher  Never been on BP meds    Liver problems better  Had BW done every 3 mo  Last BMP a mo ago    Varicose veins  Compression stockings hurt more at ankles  Standing a lot at work  No pain usually  occ cramps    Smoking still half a pack  Tried Chantix and patches in the past    HM reviewed  Pt refusing Pneumonia shot    Review of Systems   Constitutional: Negative for fever and weight loss. Respiratory: Negative for cough and shortness of breath. Cardiovascular: Negative for chest pain, palpitations and leg swelling. Neurological: Negative for dizziness and headaches. Past Medical History:   Diagnosis Date    Arthritis     [facility] ([facility]     Hepatoma ([facility] 1/6/2017    Hypertension     PT DENIES    Liver disease     HEPATITIS B    Liver mass, right lobe 4/22/2016    Pulmonary nodule 06/2016    right middle lobe, recheck 11/16 stable at 2 mm, recheck again in 6 mo    Ulnar nerve compression     right     Current Outpatient Prescriptions   Medication Sig Dispense Refill    hydroCHLOROthiazide (HYDRODIURIL) 25 mg tablet Take 1 Tab by mouth daily. Indications: hypertension 30 Tab 0    tenofovir DISOPROXIL FUMARATE (VIREAD) 300 mg tablet Take 1 Tab by mouth daily. 28 Tab 11     Allergies   Allergen Reactions    Venom-Honey Bee Anaphylaxis     Visit Vitals    /83 (BP 1 Location: Right arm, BP Patient Position: Sitting)    Pulse (!) 57    Temp 98.4 °F (36.9 °C) (Temporal)    Resp 18    Ht 5' 7\" (1.702 m)    Wt 147 lb (66.7 kg)    SpO2 98%    BMI 23.02 kg/m2     Physical Exam   Constitutional: He is oriented to person, place, and time. He appears well-developed and well-nourished. No distress. HENT:   Head: Normocephalic and atraumatic. Mouth/Throat: No oropharyngeal exudate. Eyes: Conjunctivae and EOM are normal. Pupils are equal, round, and reactive to light. Cardiovascular: Normal rate and regular rhythm. Exam reveals gallop (occ). Varicose veins bilat   Pulmonary/Chest: Effort normal and breath sounds normal.   Abdominal: Soft. He exhibits no distension. There is no tenderness. Musculoskeletal: He exhibits no edema. Lymphadenopathy:     He has no cervical adenopathy. Neurological: He is alert and oriented to person, place, and time. Skin: Skin is warm and dry. Psychiatric: He has a normal mood and affect. Nursing note and vitals reviewed. ASSESSMENT and PLAN    ICD-10-CM ICD-9-CM    1. Essential hypertension I10 401.9 hydroCHLOROthiazide (HYDRODIURIL) 25 mg tablet   2. Varicose vein of leg I83.90 454.9 Try Ace bandage   3. Tobacco abuse Z72.0 305.1 Work on habits, encourage to quit   4.  Screening for colon cancer Z12.11 V76.51 OCCULT BLOOD, IMMUNOASSAY (FIT)   recheck BP in 3 w, come fasting for BW including LP, CBC and CMP

## 2018-05-22 ENCOUNTER — OFFICE VISIT (OUTPATIENT)
Dept: FAMILY MEDICINE CLINIC | Age: 58
End: 2018-05-22

## 2018-05-22 VITALS
OXYGEN SATURATION: 99 % | HEIGHT: 67 IN | RESPIRATION RATE: 16 BRPM | HEART RATE: 52 BPM | DIASTOLIC BLOOD PRESSURE: 64 MMHG | SYSTOLIC BLOOD PRESSURE: 140 MMHG | TEMPERATURE: 97.2 F | WEIGHT: 146 LBS | BODY MASS INDEX: 22.91 KG/M2

## 2018-05-22 DIAGNOSIS — I10 ESSENTIAL HYPERTENSION: Primary | ICD-10-CM

## 2018-05-22 RX ORDER — HYDROCHLOROTHIAZIDE 25 MG/1
25 TABLET ORAL DAILY
Qty: 30 TAB | Refills: 5 | Status: SHIPPED | OUTPATIENT
Start: 2018-05-22 | End: 2019-10-01

## 2018-05-22 NOTE — PROGRESS NOTES
HISTORY OF PRESENT ILLNESS  Polina Blandon is a 60 y.o. male. Chief Complaint   Patient presents with    Hypertension     follow up       HPI  BP in high 120-130's at home  Getting more cramps occ but  Not too bad  no other SE    Will have BW done in 1 mo again  Will add Chol check to it since it was never done    Still has FIT test at home    Refusing Pneumonia shot    Review of Systems   Respiratory: Positive for cough (sometimes). Cardiovascular: Negative for chest pain. Musculoskeletal: Positive for myalgias (occ cramps). Past Medical History:   Diagnosis Date    Arthritis     Hep[facility] B carrier ([facility]     Hepatoma ([facility] 1/6/2017    Hypertension     PT DENIES    Liver disease     HEPATITIS B    Liver mass, right lobe 4/22/2016    Pulmonary nodule 06/2016    right middle lobe, recheck 11/16 stable at 2 mm, recheck again in 6 mo    Ulnar nerve compression     right       Past Surgical History:   Procedure Laterality Date    HX CHOLECYSTECTOMY       [address] A.    HX ORTHOPAEDIC Right 2014    ELBOW    HX OTHER SURGICAL  01/06/2017    partial right [id number]. [place]       Current Outpatient Prescriptions   Medication Sig Dispense Refill    hydroCHLOROthiazide (HYDRODIURIL) 25 mg tablet Take 1 Tab by mouth daily. Indications: hypertension 30 Tab 5    tenofovir DISOPROXIL FUMARATE (VIREAD) 300 mg tablet Take 1 Tab by mouth daily. 28 Tab 11       Allergies   Allergen Reactions    Venom-Honey Bee Anaphylaxis       Visit Vitals    /64    Pulse (!) 52    Temp 97.2 °F (36.2 °C) (Temporal)    Resp 16    Ht 5' 7\" (1.702 m)    Wt 146 lb (66.2 kg)    SpO2 99%    BMI 22.87 kg/m2       Physical Exam   Constitutional: He is oriented to person, place, and time. He appears well-developed and well-nourished. No distress. HENT:   Head: Normocephalic and atraumatic. Eyes: Conjunctivae and EOM are normal.   Cardiovascular: Normal rate and regular rhythm. Pulmonary/Chest: Effort normal and breath sounds normal.   Musculoskeletal: He exhibits no edema. Neurological: He is alert and oriented to person, place, and time. Skin: Skin is warm and dry. Psychiatric: He has a normal mood and affect. Nursing note and vitals reviewed. ASSESSMENT and PLAN    ICD-10-CM ICD-9-CM    1.  Essential hypertension I10 401.9 hydroCHLOROthiazide (HYDRODIURIL) 25 mg tablet      LIPID PANEL WITH LDL/HDL RATIO   recheck in 6 mo

## 2018-05-22 NOTE — PROGRESS NOTES
Chief Complaint   Patient presents with    Hypertension     follow up     Health Maintenance Due   Topic Date Due    Pneumococcal 19-64 Highest Risk (1 of 3 - PCV13) 01/02/1978    DTaP/Tdap/Td series (1 - Tdap) 01/02/1980    FOBT Q 1 YEAR AGE 50-75  01/02/2009     1. Have you been to the ER, urgent care clinic since your last visit? Hospitalized since your last visit? No    2. Have you seen or consulted any other health care providers outside of the [address] [place] [address] since your last visit? Include any pap smears or colon screening.  No

## 2018-07-30 ENCOUNTER — OFFICE VISIT (OUTPATIENT)
Dept: HEMATOLOGY | Age: 58
End: 2018-07-30

## 2018-07-30 VITALS
OXYGEN SATURATION: 98 % | SYSTOLIC BLOOD PRESSURE: 175 MMHG | TEMPERATURE: 97.5 F | HEART RATE: 60 BPM | DIASTOLIC BLOOD PRESSURE: 83 MMHG | WEIGHT: 145 LBS | BODY MASS INDEX: 22.71 KG/M2

## 2018-07-30 DIAGNOSIS — C22.0 HEPATOCELLULAR CARCINOMA (HCC): ICD-10-CM

## 2018-07-30 DIAGNOSIS — B18.1 CHRONIC HEPATITIS B (HCC): Primary | ICD-10-CM

## 2018-07-30 NOTE — PROGRESS NOTES
70 Jamil Hanna MD, [address], [facility], [place]       MAICOL Linder PA-C Francoise Milian, PRINCESS-BC   MAICOL Matamoros NP [facility]    at [address], [address] [place]  22.    [phone]    FAX: [address] [place] [address], [address]    [phone]    FAX: [phone]         Patient Care Team:  Lokesh Borden MD as PCP - General (Family Practice)  Daniella Jo MD (Cardiology)  Marcelle Hester MD (General Surgery)  Sathish Hester, DUTCH as Nurse Navigator      Patient Active Problem List   Diagnosis Code    Chronic hepatitis B ([address] B18.1    Liver mass, right lobe R16.0    Pulmonary nodule R91.1    Hepatocellular carcinoma ([address] C22.0    Liver hematoma, infected P44.169F    H/O resection of liver Z90.49       Daniel Carrera returns to the [address] for management of [address] and chronic HBV. The active problem list, all pertinent past medical history, medications, radiologic findings and laboratory findings related to the liver disorder were reviewed with the patient. The patient is a 60 y.o.  male from [place] who was first noted to have abnormalities in liver transaminases in 3/2016. Serologic studies demonstrated he was positive for HBV and ASMA. Assessment of liver fibrosis with Fibroscan and surgical resection suggested stage 3 bridging fibrosis. He was started on Viread in 6/2016. The last HBV DNA was undetectable and he has continued to tolerate this medication well. He developed [address] in segment 8, right lobe in 11/2016. This was treated with surgical resection in 1/2017.     He has undergone regular surveillance for [address] following surgical resection. A CT scan in 2/2018 demonstrated a new 1.1 cm lesion in segment 7, right lobe not adjacent to the previous resection site. At his last office visit, we had presented him with treatment options for recurrent [address] he elected at that time to proceed with RFA. He was not open to transplant at that time, but relates that he might consider this as an option in the future if needed. He has been trying to cut back on cigarette use. Patient underwent uneventful RFA of the segment 7 lesion on 3/30/2018. This is his first follow-up for evaluation and imaging. The patient notes persistent pain in the right side over the liver since the liver resection, this has not gotten any worse. The patient has not experienced fatigue. The patient completes all daily activities without any functional limitations. ALLERGIES  Allergies   Allergen Reactions    Venom-Honey Bee Anaphylaxis       MEDICATIONS  Current Outpatient Prescriptions   Medication Sig    hydroCHLOROthiazide (HYDRODIURIL) 25 mg tablet Take 1 Tab by mouth daily. Indications: hypertension    tenofovir DISOPROXIL FUMARATE (VIREAD) 300 mg tablet Take 1 Tab by mouth daily. No current facility-administered medications for this visit. SYSTEM REVIEW NOT RELATED TO LIVER DISEASE OR REVIEWED ABOVE:  Constitution systems: Negative for fever, chills, weight gain, weight loss. Eyes: Negative for visual changes. ENT: Negative for sore throat, painful swallowing. Respiratory: Negative for cough, hemoptysis, SOB. Cardiology: Negative for chest pain, palpitations. GI:  Negative for constipation or diarrhea. Some sharp/pulling pain inferior to incision, persists ~18 month post-op. : Negative for urinary frequency, dysuria, hematuria, nocturia. Skin: Negative for rash. Diminished sensitivity of the skin overlying the right anterior thigh since surgery.   Hematology: Negative for easy bruising, blood clots. Musculo-skeletal: Negative for back pain, muscle pain, weakness. Neurologic: Negative for headaches, dizziness, vertigo, memory problems not related to HE. Psychology: Negative for anxiety, depression. FAMILY HISTORY:  The father  of lung cancer. The patient has no knowledge of the mother's medical condition. There is no family history of liver disease. SOCIAL HISTORY:  The patient is . The patient has 5 children, and 3 grandchildren. The patient currently smokes less than quarter pack of tobacco daily. He is trying to cut back on cigarettes in general.  The patient has previously consumed alcohol in excess. He now consumes alcoholic beverages on weekends. This represents a significant reduction for him. The patient currently works full time as a . PHYSICAL EXAMINATION:  Visit Vitals    /83 (BP 1 Location: Right arm, BP Patient Position: Sitting)    Pulse 60    Temp 97.5 °F (36.4 °C) (Tympanic)    Wt 145 lb (65.8 kg)    SpO2 98%    BMI 22.71 kg/m2     General: No acute distress. Eyes: Sclera anicteric. ENT: No oral lesions. Thyroid normal.  Nodes: No adenopathy. Skin: No spider angiomata. No jaundice. No palmar erythema. Respiratory: Lungs clear to auscultation. Cardiovascular: Regular heart rate. No murmurs. No JVD. Abdomen: 9-10 cm RUQ incisional scar, well-approximated without keloid or hernia. No surrounding erythema. Tender with palpation along the inferior border of the liver, which is prominent. Small, reducible umbilical hernia. Spleen not palpable. No obvious ascites. Extremities: No edema. No muscle wasting. No gross arthritic changes. Positive for significant varicose veins on the LLE. Neurologic: Alert and oriented. Cranial nerves grossly intact. No asterixis.     LABORATORY STUDIES:  [facility] [place] [facility] [address] [place] [address] & Units 3/21/2018 2018   WBC 4.1 - 11.1 K/uL 8.3 7.1 ANC 1.8 - 8.0 K/UL     HGB 12.1 - 17.0 g/dL 14.8 15.0    - 400 K/uL 197 237   INR 0.9 - 1.1   1.0 1.1   AST 0 - 40 IU/L  43 (H)   ALT 0 - 44 IU/L  21   Alk Phos 39 - 117 IU/L  68   Bili, Total 0.0 - 1.2 mg/dL  0.3   Bili, Direct 0.00 - 0.40 mg/dL  0.14   Albumin 3.5 - 5.5 g/dL  4.6   BUN 6 - 20 MG/DL 18 10   Creat 0.70 - 1.30 MG/DL 1.26 0.89   Na 136 - 145 mmol/L 139 137   K 3.5 - 5.1 mmol/L 4.8 4.8   Cl 97 - 108 mmol/L 106 95 (L)   CO2 21 - 32 mmol/L 30 24   Glucose 65 - 100 mg/dL 95 95   Magnesium 1.6 - 2.4 mg/dL       Liver [place] [address] Ref Rng & Units 9/28/2017   WBC 4.1 - 11.1 K/uL 7.7   ANC 1.8 - 8.0 K/UL    HGB 12.1 - 17.0 g/dL 15.5    - 400 K/uL 213   INR 0.9 - 1.1      AST 0 - 40 IU/L 39   ALT 0 - 44 IU/L 24   Alk Phos 39 - 117 IU/L 71   Bili, Total 0.0 - 1.2 mg/dL 0.3   Bili, Direct 0.00 - 0.40 mg/dL 0.14   Albumin 3.5 - 5.5 g/dL 4.8   BUN 6 - 20 MG/DL 10   Creat 0.70 - 1.30 MG/DL 1.18   Na 136 - 145 mmol/L 141   K 3.5 - 5.1 mmol/L 5.6 (H)   Cl 97 - 108 mmol/L 99   CO2 21 - 32 mmol/L 28   Glucose 65 - 100 mg/dL 91   Magnesium 1.6 - 2.4 mg/dL      Cancer Screening Latest Ref Rng & Units 1/29/2018 9/28/2017 5/2/2017   AFP, Serum 0.0 - 8.0 ng/mL 6.4 2.0 1.4   AFP-L3% 0.0 - 9.9 % 58.6 (H) Comment Comment     Virology Latest Ref Rng & Units 1/29/2018 9/28/2017 5/2/2017   HBV DNA IU/mL <10 <10 <10   Additional lab values drawn at today's office visit are pending at the time of documentation. SEROLOGIES:  Serologies Latest Ref Rng 6/10/2016 4/22/2016   Hep A Ab, Total Negative  Positive (A)   Hep B Surface Ag Negative  Positive (A)   Hep B Core Ab, Total Negative  Positive (A)   Hep B Surface AB QL   Non Reactive   Hep C Ab 0.0 - 0.9 s/co ratio  0.1   Hep D AB Not Detected Not Detected    Ferritin 30 - 400 ng/mL  359   Iron % Saturation 15 - 55 %  76 (HH)   AKIN, IFA   Negative   ASMCA 0 - 19 Units  83 (H)   Alpha-1 antitrypsin level 90 - 200 mg/dL  176     6/2016.   HIV Ab negative. HBeAg negative, HBeAb positive. LIVER HISTOLOGY:  6/2016. FibroScan performed at [address] EkPa was 11.8. IQR/med 26%. The results suggested a fibrosis level of F3.  1/2017. Liver resection. 2.2 cm HCC, moderately differentiated with free margins. Adjacent liver shows chronic hepatitis with bridging fibrosis (grade 2, stage 3)    ENDOSCOPIC PROCEDURES:  Not available or performed    RADIOLOGY:  3/2016. Ultrasound of liver. Normal appearing liver. Single hyperecholic mass in the right lobe measuring 1.5 cm. Consistent with hemangioma. 5/2016. CT scan abdomen with and without IV contrast. 1.9 cm hemangioma in the right hepatic lobe. No additional liver lesion. 11/2016. Ultrasound of liver. Increase in size of the right lobe hepatic mass. CT scan or MRI recommended. 11/2016. CT scan abdomen. Interval enlargement of hypervascular segment 8 liverlesion now 2.5 x 1.8 x 3.1 cm. In the setting of hepatitis B, despite lack of CT evidence of cirrhosis, hepatocellular carcinoma is more likely than an unusual presentation of a benign lesion such as atypical hemangioma. 3/2017. CT of abdomen. No residual enhancing tumor  Gas within the right lobe resection site is unexpected 2 months postoperatively. Surgical packing material, abscess, and less likely retained postoperative gas, are possible. 4/2017. CT abdomen. No CT evidence of locally recurrent hepatocellular carcinoma, metastatic disease, or new lesions within cirrhotic appearing liver. Resolution of fluid collection. 2/2018. CT scan abdomen with and without IV contrast.  Changes consistent with chronic liver disease. New 1.1 cm enhancing liver mass with washout and rim enhancement in segment 7, right lobe. No dilated bile ducts. No ascites. OTHER TESTING:  3/2018. RFA treatment to segment 7 lesion. ASSESSMENT AND PLAN:  Chronic HBV with bridging fibrosis.      Liver function is normal.  The platelet count is normal.    Chronic E-antigen negative HBV with undetectable HBV DNA on Viread. Will continue Viread, he continues to tolerate this well. [address] treated with surgical resection in 1/2017. Recurrence of [address] in 2/2018. The patient had elected to treat with RFA in 3/2018. He presents today for scheduling of CT evaluation and repeat lab studies. He is without new physical complaints. I have reviewed with the patient options for treatment/transplant if recurrence were to occur. Patient would entertain transplant in the future. He is making an attempt to quit cigarettes and has largely eliminated alcohol. We could also consider use of oral anti-tumor agents as needed. Will review upcoming films and his case further with Dr Declan Sterling. Pulmonary nodules on chest CT have not changed since 11/2016. The patient was directed to continue all current medications at the current dosages. There are no contraindications for the patient to take any medications that are necessary for treatment of other medical issues. The patient was advised to remain abstinent from alcohol. Vaccination for viral hepatitis A is not needed. The patient has serologic evidence of prior exposure or vaccination with immunity. All of the above issues were discussed with the patient. All questions were answered. The patient expressed a clear understanding of the above. [facility] 87 in 3 months with CT of the liver in the meantime.      Leila Begum PA-C  Liver [place] [address], [address] [place]  22.  [phone]

## 2018-07-30 NOTE — PROGRESS NOTES
1. Have you been to the ER, urgent care clinic since your last visit? Hospitalized since your last visit? No    2. Have you seen or consulted any other health care providers outside of the [address] [place] [address] since your last visit? Include any pap smears or colon screening. No   Chief Complaint   Patient presents with    Follow-up     Visit Vitals    /83 (BP 1 Location: Right arm, BP Patient Position: Sitting)    Pulse 60    Temp 97.5 °F (36.4 °C) (Tympanic)    Wt 145 lb (65.8 kg)    SpO2 98%    BMI 22.71 kg/m2     PHQ over the last two weeks 7/30/2018   Little interest or pleasure in doing things Not at all   Feeling down, depressed, irritable, or hopeless Not at all   Total Score PHQ 2 0     Learning Assessment 7/30/2018   PRIMARY LEARNER Patient   HIGHEST LEVEL OF EDUCATION - PRIMARY LEARNER  -   [facility] PRIMARY LEARNER NONE   CO-LEARNER CAREGIVER Yes   CO-LEARNER NAME -   Dannie 32 LEVEL OF EDUCATION -   [address] [place] -    NEED -   LEARNER PREFERENCE PRIMARY LISTENING   LEARNER Mehdi 11 -   ANSWERED BY patient   RELATIONSHIP SELF   ASSESSMENT COMMENT -     Abuse Screening Questionnaire 7/30/2018   Do you ever feel afraid of your partner? N   Are you in a relationship with someone who physically or mentally threatens you? N   Is it safe for you to go home?  Isabella Rubi

## 2018-07-30 NOTE — MR AVS SNAPSHOT
[address] [facility] Isai [phone] [address] 
[phone] Patient: Makenna Lowe MRN: [id number] JMS:[birth year] Visit Information Date & Time Provider Department Dept. Phone Encounter #  
 7/30/2018  2:30 PM Maclolm Guzman, [address] [place] [address] [id number] Follow-up Instructions Return in about 3 months (around 10/30/2018) for Jonathan Mcdermott. Your Appointments 11/1/2018  2:30 PM  
Follow Up with KEY Ramirez 75 ([facility] Appt Note: Follow up [facility] [place] [phone] [facility] [address]  
[address] [facility] [address] Upcoming Health Maintenance Date Due Pneumococcal 19-64 Highest Risk (1 of 3 - PCV13) 1/2/1978 DTaP/Tdap/Td series (1 - Tdap) 1/2/1980 FOBT Q 1 YEAR AGE 50-75 1/2/2009 Influenza Age 5 to Adult 8/1/2018 Allergies as of 7/30/2018  Review Complete On: 7/30/2018 By: KEY Ramirez Severity Noted Reaction Type Reactions Venom-honey Bee High 04/08/2014    Anaphylaxis Current Immunizations  Reviewed on 1/9/2017 No immunizations on file. Not reviewed this visit You Were Diagnosed With   
  
 Codes Comments Chronic hepatitis B ([facility]    -  Primary ICD-10-CM: B18.1 ICD-9-CM: 070.32 Hepatocellular carcinoma ([facility]     ICD-10-CM: C22.0 ICD-9-CM: 155.0 Vitals BP Pulse Temp Weight(growth percentile) SpO2 BMI  
 175/83 (BP 1 Location: Right arm, BP Patient Position: Sitting) 60 97.5 °F (36.4 °C) (Tympanic) 145 lb (65.8 kg) 98% 22.71 kg/m2 Smoking Status Current Every Day Smoker Vitals History BMI and BSA Data Body Mass Index Body Surface Area  
 22.71 kg/m 2 1.76 m 2 Preferred Pharmacy Pharmacy Name Phone [facility] [address], [address] [place] [address] Street  Your Updated Medication List  
  
   
 This list is accurate as of 7/30/18  2:57 PM.  Always use your most recent med list.  
  
  
  
  
 hydroCHLOROthiazide 25 mg tablet Commonly known as:  HYDRODIURIL Take 1 Tab by mouth daily. Indications: hypertension  
  
 tenofovir DISOPROXIL FUMARATE 300 mg tablet Commonly known as:  Wily Canavan Take 1 Tab by mouth daily. We Performed the Following AFP WITH AFP-L3% [[id number] Custom] CBC W/O DIFF [71549 CPT(R)] HEPATIC FUNCTION PANEL (6) [[id number] Custom] HEPATITIS B, QT, PCR [00167 CPT(R)] METABOLIC PANEL, BASIC [61702 CPT(R)] PROTHROMBIN TIME + INR [71372 CPT(R)] Follow-up Instructions Return in about 3 months (around 10/30/2018) for Rigobertokobe Christiano. To-Do List   
 07/30/2018 Imaging:  CT ABD W WO CONT Referral Information Referral ID Referred By Referred To  
  
 [id number] Misty Harvey ASHLEY Not Available Visits Status Start Date End Date 1 New Request 7/30/18 7/30/19 If your referral has a status of pending review or denied, additional information will be sent to support the outcome of this decision. Introducing [facility] [place] [facility]! Saadia Wallace introduces Hello[employer] patient portal. Now you can access parts of your medical record, email your doctor's office, and request medication refills online. 1. In your internet browser, go to [email or web address] 2. Click on the First Time User? Click Here link in the Sign In box. You will see the New Member Sign Up page. 3. Enter your Hello[employer] Access Code exactly as it appears below. You will not need to use this code after youve completed the sign-up process. If you do not sign up before the expiration date, you must request a new code. · Hello[employer] Access Code: [email or web address] Expires: 7/30/2018  4:42 PM 
 
4. Enter the last four digits of your Social Security Number (xxxx) and Date of Birth (mm/dd/yyyy) as indicated and click Submit.  You will be taken to the next sign-up page. 5. Create a Neumitra ID. This will be your Neumitra login ID and cannot be changed, so think of one that is secure and easy to remember. 6. Create a Neumitra password. You can change your password at any time. 7. Enter your Password Reset Question and Answer. This can be used at a later time if you forget your password. 8. Enter your e-mail address. You will receive e-mail notification when new information is available in [address] 9. Click Sign Up. You can now view and download portions of your medical record. 10. Click the Download Summary menu link to download a portable copy of your medical information. If you have questions, please visit the Frequently Asked Questions section of the Ne[employer] website. Remember, Ne[employer]ra is NOT to be used for urgent needs. For medical emergencies, dial 911. Now available from your iPhone and Android! Please provide this summary of care documentation to your next provider. Your primary care clinician is listed as Latesha Sy. If you have any questions after today's visit, please call [phone].

## 2018-07-31 LAB
AFP L3 MFR SERPL: NORMAL % (ref 0–9.9)
AFP SERPL-MCNC: 1.7 NG/ML (ref 0–8)
ALBUMIN SERPL-MCNC: 4.9 G/DL (ref 3.5–5.5)
ALP SERPL-CCNC: 65 IU/L (ref 39–117)
ALT SERPL-CCNC: 31 IU/L (ref 0–44)
AST SERPL-CCNC: 57 IU/L (ref 0–40)
BILIRUB DIRECT SERPL-MCNC: 0.12 MG/DL (ref 0–0.4)
BILIRUB SERPL-MCNC: 0.3 MG/DL (ref 0–1.2)
BUN SERPL-MCNC: 10 MG/DL (ref 6–24)
BUN/CREAT SERPL: 10 (ref 9–20)
CALCIUM SERPL-MCNC: 9.5 MG/DL (ref 8.7–10.2)
CHLORIDE SERPL-SCNC: 98 MMOL/L (ref 96–106)
CO2 SERPL-SCNC: 22 MMOL/L (ref 20–29)
CREAT SERPL-MCNC: 0.96 MG/DL (ref 0.76–1.27)
ERYTHROCYTE [DISTWIDTH] IN BLOOD BY AUTOMATED COUNT: 15 % (ref 12.3–15.4)
GLUCOSE SERPL-MCNC: 98 MG/DL (ref 65–99)
HBV DNA SERPL NAA+PROBE-ACNC: <10 IU/ML
HBV DNA SERPL NAA+PROBE-LOG IU: NORMAL LOG10 IU/ML
HCT VFR BLD AUTO: 44 % (ref 37.5–51)
HGB BLD-MCNC: 15.5 G/DL (ref 13–17.7)
INR PPP: 1 (ref 0.8–1.2)
MCH RBC QN AUTO: 34.6 PG (ref 26.6–33)
MCHC RBC AUTO-ENTMCNC: 35.2 G/DL (ref 31.5–35.7)
MCV RBC AUTO: 98 FL (ref 79–97)
PLATELET # BLD AUTO: 238 X10E3/UL (ref 150–379)
POTASSIUM SERPL-SCNC: 5.1 MMOL/L (ref 3.5–5.2)
PROTHROMBIN TIME: 10.4 SEC (ref 9.1–12)
RBC # BLD AUTO: 4.48 X10E6/UL (ref 4.14–5.8)
REF LAB TEST REF RANGE: NORMAL
SODIUM SERPL-SCNC: 136 MMOL/L (ref 134–144)
WBC # BLD AUTO: 6.8 X10E3/UL (ref 3.4–10.8)

## 2018-08-01 NOTE — PROGRESS NOTES
Pt notified of stable findings and decline in tumor marker post-ablation. Will proceed with CT next week as planned.

## 2018-08-07 ENCOUNTER — HOSPITAL ENCOUNTER (OUTPATIENT)
Dept: CT IMAGING | Age: 58
Discharge: HOME | End: 2018-08-07
Attending: PHYSICIAN ASSISTANT
Payer: COMMERCIAL

## 2018-08-07 DIAGNOSIS — C22.0 HEPATOCELLULAR CARCINOMA (HCC): ICD-10-CM

## 2018-08-07 DIAGNOSIS — B18.1 CHRONIC HEPATITIS B (HCC): ICD-10-CM

## 2018-08-07 PROCEDURE — 74011636320 HC RX REV CODE- 636/320: Performed by: RADIOLOGY

## 2018-08-07 PROCEDURE — 74170 CT ABD WO CNTRST FLWD CNTRST: CPT

## 2018-08-07 PROCEDURE — 74011000258 HC RX REV CODE- 258: Performed by: RADIOLOGY

## 2018-08-07 RX ADMIN — IOPAMIDOL 100 ML: 755 INJECTION, SOLUTION INTRAVENOUS at 08:10

## 2018-08-07 RX ADMIN — SODIUM CHLORIDE 50 ML: 900 INJECTION, SOLUTION INTRAVENOUS at 08:10

## 2018-11-01 ENCOUNTER — OFFICE VISIT (OUTPATIENT)
Dept: HEMATOLOGY | Age: 58
End: 2018-11-01

## 2018-11-01 VITALS
DIASTOLIC BLOOD PRESSURE: 87 MMHG | OXYGEN SATURATION: 97 % | HEART RATE: 56 BPM | WEIGHT: 147.2 LBS | BODY MASS INDEX: 23.05 KG/M2 | SYSTOLIC BLOOD PRESSURE: 175 MMHG | TEMPERATURE: 97.2 F

## 2018-11-01 DIAGNOSIS — C22.0 HEPATOCELLULAR CARCINOMA (HCC): Primary | ICD-10-CM

## 2018-11-01 DIAGNOSIS — B18.1 CHRONIC HEPATITIS B (HCC): ICD-10-CM

## 2018-11-01 NOTE — PROGRESS NOTES
1. Have you been to the ER, urgent care clinic since your last visit? Hospitalized since your last visit? No    2. Have you seen or consulted any other health care providers outside of the [address] [place] [address] since your last visit? Include any pap smears or colon screening. No   Chief Complaint   Patient presents with    Follow-up     Visit Vitals  /87 (BP 1 Location: Left arm, BP Patient Position: Sitting)   Pulse (!) 56   Temp 97.2 °F (36.2 °C) (Tympanic)   Wt 147 lb 3.2 oz (66.8 kg)   SpO2 97%   BMI 23.05 kg/m²     PHQ over the last two weeks 11/1/2018   Little interest or pleasure in doing things Not at all   Feeling down, depressed, irritable, or hopeless Not at all   Total Score PHQ 2 0     Learning Assessment 11/1/2018   PRIMARY LEARNER Patient   HIGHEST LEVEL OF EDUCATION - PRIMARY LEARNER  -   BARRIERS PRIMARY LEARNER NONE   CO-LEARNER CAREGIVER Yes   CO-LEARNER NAME -   Mary Arias -    NEED -   LEARNER PREFERENCE PRIMARY LISTENING   LEARNER Mehdi 11 -   ANSWERED BY patient   RELATIONSHIP SELF   ASSESSMENT COMMENT -     Abuse Screening Questionnaire 11/1/2018   Do you ever feel afraid of your partner? N   Are you in a relationship with someone who physically or mentally threatens you? N   Is it safe for you to go home?  Nazia Arrieta

## 2018-11-01 NOTE — PROGRESS NOTES
70 Jamil Hanna MD, [place], [address], [address] [facility]       April Wilmer Sanduh, MORALES Norton, [facility]   MAICOL Miller NP [address] [facility] 136    at [facility]    [address] [facility], [facility], [facility].    [phone]    FAX: [address] [place] [address], [address]    [phone]    FAX: [phone]     Patient Care Team:  David Mae MD as PCP - General (Family Practice)  Chapo Ferrell MD (Cardiology)  Ramon Taylor MD (General Surgery)  Briana Jansen RN as Nurse Navigator      Patient Active Problem List   Diagnosis Code    Chronic hepatitis B ([address] B18.1    Liver mass, right lobe R16.0    Pulmonary nodule R91.1    Hepatocellular carcinoma ([address] C22.0    Liver hematoma, infected I10.675B    H/O resection of liver Z90.49       Khalif Iron returns to the [address] for management of [address] and chronic HBV. The active problem list, all pertinent past medical history, medications, radiologic findings and laboratory findings related to the liver disorder were reviewed with the patient. The patient is a 60 y.o.  male from [place] who was first noted to have abnormalities in liver transaminases in 3/2016. Serologic studies demonstrated he was positive for HBV and ASMA. Assessment of liver fibrosis with Fibroscan and surgical resection suggested stage 3 bridging fibrosis. He was started on Viread in 2016. The last HBV DNA was undetectable and he has continued to tolerate this medication well and has had no issues with ongoing access to this medication. He developed [address] in segment 8, right lobe in 2016.   This was treated with surgical resection in 1/2017. He has undergone regular surveillance for [address] following surgical resection. A CT scan in 2/2018 demonstrated a new 1.1 cm lesion in segment 7, right lobe not adjacent to the previous resection site. We had presented him with treatment options for recurrent HCC, he elected at that time to proceed with RFA. He was not open to transplant at that time, but relates that he might consider this as an option in the future if needed. He has been trying to cut back on cigarette use, but is still smoking heavily and drinking a few beers \"every once in a while. \"    Patient underwent uneventful RFA of the segment 7 lesion on 3/30/2018. His last follow-up imaging in 8/2018 showed adequate ablation and no evidence of new hepatic masses/lesions. The patient notes persistent pain in the right side over the liver since the liver resection, this has not gotten any worse. The patient has not experienced fatigue. The patient completes all daily activities without any functional limitations. ALLERGIES  Allergies   Allergen Reactions    Venom-Honey Bee Anaphylaxis       MEDICATIONS  Current Outpatient Medications   Medication Sig    hydroCHLOROthiazide (HYDRODIURIL) 25 mg tablet Take 1 Tab by mouth daily. Indications: hypertension    tenofovir DISOPROXIL FUMARATE (VIREAD) 300 mg tablet Take 1 Tab by mouth daily. No current facility-administered medications for this visit. SYSTEM REVIEW NOT RELATED TO LIVER DISEASE OR REVIEWED ABOVE:  Constitution systems: Negative for fever, chills, weight gain, weight loss. Eyes: Negative for visual changes. ENT: Negative for sore throat, painful swallowing. Respiratory: Negative for cough, hemoptysis, SOB. Cardiology: Negative for chest pain, palpitations. GI:  Negative for constipation or diarrhea. Some sharp/pulling pain inferior to incision, persists ~18 month post-op - but to a much less extent.    : Negative for urinary frequency, dysuria, hematuria, nocturia. Skin: Negative for rash. Diminished sensitivity of the skin overlying the right anterior thigh since surgery. Hematology: Negative for easy bruising, blood clots. Musculo-skeletal: Negative for back pain, muscle pain, weakness. Neurologic: Negative for headaches, dizziness, vertigo, memory problems not related to HE. Psychology: Negative for anxiety, depression. FAMILY HISTORY:  The father  of lung cancer. The patient has no knowledge of the mother's medical condition. There is no family history of liver disease. SOCIAL HISTORY:  The patient is . The patient has 5 children, and 3 grandchildren. The patient currently smokes less than quarter pack of tobacco daily. He is trying to cut back on cigarettes in general.  The patient has previously consumed alcohol in excess. He now consumes alcoholic beverages on weekends. This represents a significant reduction for him. The patient currently works full time as a . PHYSICAL EXAMINATION:  Visit Vitals  /87 (BP 1 Location: Left arm, BP Patient Position: Sitting)   Pulse (!) 56   Temp 97.2 °F (36.2 °C) (Tympanic)   Wt 147 lb 3.2 oz (66.8 kg)   SpO2 97%   BMI 23.05 kg/m²     General: No acute distress. Eyes: Sclera anicteric. ENT: No oral lesions. Thyroid normal.  Nodes: No adenopathy. Skin: No spider angiomata. No jaundice. No palmar erythema. Respiratory: Lungs clear to auscultation. Cardiovascular: Regular heart rate. No murmurs. No JVD. Abdomen: 9-10 cm RUQ incisional scar, well-approximated without keloid or hernia. No surrounding erythema. Tender with palpation along the inferior border of the liver, which is prominent. Small, reducible umbilical hernia. Spleen not palpable. No obvious ascites. Extremities: No edema. No muscle wasting. No gross arthritic changes. Positive for significant varicose veins on the LLE.    Neurologic: Alert and oriented. Cranial nerves grossly intact. No asterixis. LABORATORY STUDIES:  [facility] [place] of [address] [place] [address] & Units 7/30/2018 3/21/2018   WBC 3.4 - 10.8 x10E3/uL 6.8 8.3   ANC 1.8 - 8.0 K/UL     HGB 13.0 - 17.7 g/dL 15.5 14.8    - 379 x10E3/uL 238 197   INR 0.8 - 1.2 1.0 1.0   AST 0 - 40 IU/L 57 (H)    ALT 0 - 44 IU/L 31    Alk Phos 39 - 117 IU/L 65    Bili, Total 0.0 - 1.2 mg/dL 0.3    Bili, Direct 0.00 - 0.40 mg/dL 0.12    Albumin 3.5 - 5.5 g/dL 4.9    BUN 6 - 24 mg/dL 10 18   Creat 0.76 - 1.27 mg/dL 0.96 1.26   Na 134 - 144 mmol/L 136 139   K 3.5 - 5.2 mmol/L 5.1 4.8   Cl 96 - 106 mmol/L 98 106   CO2 20 - 29 mmol/L 22 30   Glucose 65 - 99 mg/dL 98 95   Magnesium 1.6 - 2.4 mg/dL       [facility] [place] of [address] [place] [address] Ref Rng & Units 1/29/2018   WBC 3.4 - 10.8 x10E3/uL 7.1   ANC 1.8 - 8.0 K/UL    HGB 13.0 - 17.7 g/dL 15.0    - 379 x10E3/uL 237   INR 0.8 - 1.2 1.1   AST 0 - 40 IU/L 43 (H)   ALT 0 - 44 IU/L 21   Alk Phos 39 - 117 IU/L 68   Bili, Total 0.0 - 1.2 mg/dL 0.3   Bili, Direct 0.00 - 0.40 mg/dL 0.14   Albumin 3.5 - 5.5 g/dL 4.6   BUN 6 - 24 mg/dL 10   Creat 0.76 - 1.27 mg/dL 0.89   Na 134 - 144 mmol/L 137   K 3.5 - 5.2 mmol/L 4.8   Cl 96 - 106 mmol/L 95 (L)   CO2 20 - 29 mmol/L 24   Glucose 65 - 99 mg/dL 95   Magnesium 1.6 - 2.4 mg/dL      Cancer Screening Latest Ref Rng & Units 7/30/2018 1/29/2018 9/28/2017   AFP, Serum 0.0 - 8.0 ng/mL 1.7 6.4 2.0   AFP-L3% 0.0 - 9.9 % Comment 58.6 (H) Comment     Virology Latest Ref Rng & Units 7/30/2018 1/29/2018 9/28/2017   HBV DNA IU/mL <10 <10 <10   Additional lab values drawn at today's office visit are pending at the time of documentation.     SEROLOGIES:  Serologies Latest Ref Rng 6/10/2016 4/22/2016   Hep A Ab, Total Negative  Positive (A)   Hep B Surface Ag Negative  Positive (A)   Hep B Core Ab, Total Negative  Positive (A)   Hep B Surface AB QL   Non Reactive   Hep C Ab 0.0 - 0.9 s/co ratio  0.1   Hep D AB Not Detected Not Detected Ferritin 30 - 400 ng/mL  359   Iron % Saturation 15 - 55 %  76 (HH)   AKIN, IFA   Negative   ASMCA 0 - 19 Units  83 (H)   Alpha-1 antitrypsin level 90 - 200 mg/dL  176     6/2016. HIV Ab negative. HBeAg negative, HBeAb positive. LIVER HISTOLOGY:  6/2016. FibroScan performed at [address] EkPa was 11.8. IQR/med 26%. The results suggested a fibrosis level of F3.  1/2017. Liver resection. 2.2 cm HCC, moderately differentiated with free margins. Adjacent liver shows chronic hepatitis with bridging fibrosis (grade 2, stage 3)    ENDOSCOPIC PROCEDURES:  Not available or performed    RADIOLOGY:  3/2016. Ultrasound of liver. Normal appearing liver. Single hyperecholic mass in the right lobe measuring 1.5 cm. Consistent with hemangioma. 5/2016. CT scan abdomen with and without IV contrast. 1.9 cm hemangioma in the right hepatic lobe. No additional liver lesion. 11/2016. Ultrasound of liver. Increase in size of the right lobe hepatic mass. CT scan or MRI recommended. 11/2016. CT scan abdomen. Interval enlargement of hypervascular segment 8 liverlesion now 2.5 x 1.8 x 3.1 cm. In the setting of hepatitis B, despite lack of CT evidence of cirrhosis, hepatocellular carcinoma is more likely than an unusual presentation of a benign lesion such as atypical hemangioma. 3/2017. CT of abdomen. No residual enhancing tumor  Gas within the right lobe resection site is unexpected 2 months postoperatively. Surgical packing material, abscess, and less likely retained postoperative gas, are possible. 4/2017. CT abdomen. No CT evidence of locally recurrent hepatocellular carcinoma, metastatic disease, or new lesions within cirrhotic appearing liver. Resolution of fluid collection. 2/2018. CT scan abdomen with and without IV contrast.  Changes consistent with chronic liver disease. New 1.1 cm enhancing liver mass with washout and rim enhancement in segment 7, right lobe.   No dilated bile ducts.  No ascites. 8/2018. CT abdomen. Wide ablation zone around the segment 6-7 junction HCC. No new hepatic masses. Early arterial phase limits sensitivity for small hypervascular masses. OTHER TESTING:  3/2018. RFA treatment to segment 7 lesion. ASSESSMENT AND PLAN:  Chronic HBV with bridging fibrosis. Liver function is normal.  The platelet count is normal.    Chronic E-antigen negative HBV with undetectable HBV DNA on Viread. Will continue Viread, he continues to tolerate this well. He understands that this will be a life-long medication. [facility] treated with surgical resection in 1/2017. Recurrence of [address] in 2/2018. The patient had elected to treat with RFA in 3/2018. We will continue to monitor for signs of recurrence with repeat CT scans every 3 months through one year, then every 6 months through 5 years. He will be due for repeat CT in 11/2018 and will repeat in 4/2019. He is without new physical complaints. I have reviewed with the patient options for treatment/transplant if recurrence were to occur. Patient would entertain transplant in the future. He is making an attempt to quit cigarettes and has largely eliminated alcohol. We could also consider use of oral anti-tumor agents as needed. Pulmonary nodules on chest CT have not changed since 11/2016. The patient was directed to continue all current medications at the current dosages. There are no contraindications for the patient to take any medications that are necessary for treatment of other medical issues. The patient was advised to remain abstinent from alcohol. Vaccination for viral hepatitis A is not needed. The patient has serologic evidence of prior exposure or vaccination with immunity. All of the above issues were discussed with the patient. All questions were answered. The patient expressed a clear understanding of the above.     [facility] 87 in 3 months with CT of the liver in the meantime.      Anaya Desai PA-C  Liver [place] of [address] [facility], [address] [address]  22.  [phone]

## 2018-11-01 NOTE — LETTER
11/5/2018 9:37 AM 
 
Mr. Jd Marroquin [facility] 
[address] [facility] [address] [address] Dear Jd Jansen: 
 
Please find your most recent results below. Resulted Orders AFP WITH AFP-L3% Result Value Ref Range AFP, serum 1.7 0.0 - 8.0 ng/mL Comment:  
   AFP and AFP-L3% measured by [facility] liquid phase binding 
methodology. Results for this test should not be used as absolute evidence of 
presence or absence of malignant disease without confirmation of 
the diagnosis by another medically established diagnostic product 
or procedure. Values obtained with different assay methods or 
kits cannot be used interchangeably. AFP-L3%, Serum Comment 0.0 - 9.9 % Comment:  
   AFP-L3% result is below the detection limit of the assay. Narrative Performed at:  [address]  [id number] : Korey Serna MD, Phone:  [phone] CBC W/O DIFF Result Value Ref Range WBC 6.4 3.4 - 10.8 x10E3/uL  
 RBC 4.30 4.14 - 5.80 x10E6/uL HGB 14.8 13.0 - 17.7 g/dL HCT 43.0 37.5 - 51.0 %  (H) 79 - 97 fL  
 MCH 34.4 (H) 26.6 - 33.0 pg  
 MCHC 34.4 31.5 - 35.7 g/dL  
 RDW 14.3 12.3 - 15.4 % PLATELET 605 597 - 357 x10E3/uL Narrative Performed at:  [address]  [id number] : Korey Srena MD, Phone:  [phone] HEPATIC FUNCTION PANEL (6) Result Value Ref Range Albumin 4.6 3.5 - 5.5 g/dL Bilirubin, total 0.4 0.0 - 1.2 mg/dL Bilirubin, direct 0.13 0.00 - 0.40 mg/dL Alk. phosphatase 74 39 - 117 IU/L  
 AST (SGOT) 82 (H) 0 - 40 IU/L  
 ALT (SGPT) 34 0 - 44 IU/L Narrative Performed at:  [address]  [id number] : Korey Serna MD, Phone:  [phone] METABOLIC PANEL, BASIC Result Value Ref Range Glucose 100 (H) 65 - 99 mg/dL BUN 9 6 - 24 mg/dL Creatinine 0.97 0.76 - 1.27 mg/dL GFR est non-AA 85 >59 mL/min/1.73 GFR est AA 98 >59 mL/min/1.73  
 BUN/Creatinine ratio 9 9 - 20 Sodium 140 134 - 144 mmol/L Potassium 5.0 3.5 - 5.2 mmol/L Chloride 98 96 - 106 mmol/L  
 CO2 25 20 - 29 mmol/L Calcium 9.2 8.7 - 10.2 mg/dL Narrative Performed at:  [address]  [id number] : Alexandria Berg MD, Phone:  [phone] HEPATITIS B, QT, PCR Result Value Ref Range HBV as IU/mL HBV DNA not detected IU/mL  
 log10 HBV IU/mL CANCELED log10 IU/mL Comment:  
   Unable to calculate result since non-numeric result obtained for 
component test. 
 
Result canceled by the ancillary. TEST INFORMATION Comment Comment: The reportable range for this assay is 10 IU/mL to 1 billion IU/mL. Narrative Performed at:  [address]  [id number] : Jyoti Rachel MD, Phone:  [phone] RECOMMENDATIONS: 
 
Mr Doc Palafox, The above labs from the recent office visit look in very good shape. Please continue with the hepatitis B medication as prescribed and I will be in touch with the CT findings as soon as I receive them. Please call [phone] to schedule this CT for sometime in Nov or early December. Please call me if you have any questions: [phone] Sincerely, KEY Schmid

## 2018-11-02 LAB
AFP L3 MFR SERPL: NORMAL % (ref 0–9.9)
AFP SERPL-MCNC: 1.7 NG/ML (ref 0–8)
ALBUMIN SERPL-MCNC: 4.6 G/DL (ref 3.5–5.5)
ALP SERPL-CCNC: 74 IU/L (ref 39–117)
ALT SERPL-CCNC: 34 IU/L (ref 0–44)
AST SERPL-CCNC: 82 IU/L (ref 0–40)
BILIRUB DIRECT SERPL-MCNC: 0.13 MG/DL (ref 0–0.4)
BILIRUB SERPL-MCNC: 0.4 MG/DL (ref 0–1.2)
BUN SERPL-MCNC: 9 MG/DL (ref 6–24)
BUN/CREAT SERPL: 9 (ref 9–20)
CALCIUM SERPL-MCNC: 9.2 MG/DL (ref 8.7–10.2)
CHLORIDE SERPL-SCNC: 98 MMOL/L (ref 96–106)
CO2 SERPL-SCNC: 25 MMOL/L (ref 20–29)
CREAT SERPL-MCNC: 0.97 MG/DL (ref 0.76–1.27)
ERYTHROCYTE [DISTWIDTH] IN BLOOD BY AUTOMATED COUNT: 14.3 % (ref 12.3–15.4)
GLUCOSE SERPL-MCNC: 100 MG/DL (ref 65–99)
HBV DNA SERPL NAA+PROBE-ACNC: NORMAL IU/ML
HBV DNA SERPL NAA+PROBE-LOG IU: NORMAL LOG10 IU/ML
HCT VFR BLD AUTO: 43 % (ref 37.5–51)
HGB BLD-MCNC: 14.8 G/DL (ref 13–17.7)
MCH RBC QN AUTO: 34.4 PG (ref 26.6–33)
MCHC RBC AUTO-ENTMCNC: 34.4 G/DL (ref 31.5–35.7)
MCV RBC AUTO: 100 FL (ref 79–97)
PLATELET # BLD AUTO: 236 X10E3/UL (ref 150–379)
POTASSIUM SERPL-SCNC: 5 MMOL/L (ref 3.5–5.2)
RBC # BLD AUTO: 4.3 X10E6/UL (ref 4.14–5.8)
REF LAB TEST REF RANGE: NORMAL
SODIUM SERPL-SCNC: 140 MMOL/L (ref 134–144)
WBC # BLD AUTO: 6.4 X10E3/UL (ref 3.4–10.8)

## 2018-11-05 NOTE — PROGRESS NOTES
Letter sent to patient re stable findings, will proceed with CT of the abdomen in 11/2018 as discussed.

## 2019-02-22 ENCOUNTER — HOSPITAL ENCOUNTER (OUTPATIENT)
Dept: CT IMAGING | Age: 59
Discharge: HOME | End: 2019-02-22
Attending: PHYSICIAN ASSISTANT
Payer: COMMERCIAL

## 2019-02-22 DIAGNOSIS — C22.0 HEPATOCELLULAR CARCINOMA (HCC): ICD-10-CM

## 2019-02-22 DIAGNOSIS — B18.1 CHRONIC HEPATITIS B (HCC): ICD-10-CM

## 2019-02-22 PROCEDURE — 74011636320 HC RX REV CODE- 636/320: Performed by: INTERNAL MEDICINE

## 2019-02-22 PROCEDURE — 74170 CT ABD WO CNTRST FLWD CNTRST: CPT

## 2019-02-22 PROCEDURE — 74011000258 HC RX REV CODE- 258: Performed by: INTERNAL MEDICINE

## 2019-02-22 PROCEDURE — 71260 CT THORAX DX C+: CPT

## 2019-02-22 RX ORDER — SODIUM CHLORIDE 0.9 % (FLUSH) 0.9 %
10 SYRINGE (ML) INJECTION
Status: COMPLETED | OUTPATIENT
Start: 2019-02-22 | End: 2019-02-22

## 2019-02-22 RX ORDER — TENOFOVIR DISOPROXIL FUMARATE 300 MG/1
TABLET, FILM COATED ORAL
Qty: 30 TAB | Refills: 10 | Status: SHIPPED | OUTPATIENT
Start: 2019-02-22 | End: 2019-08-29 | Stop reason: SDUPTHER

## 2019-02-22 RX ADMIN — SODIUM CHLORIDE 100 ML: 900 INJECTION, SOLUTION INTRAVENOUS at 09:12

## 2019-02-22 RX ADMIN — IOPAMIDOL 100 ML: 755 INJECTION, SOLUTION INTRAVENOUS at 09:12

## 2019-02-22 RX ADMIN — Medication 10 ML: at 09:12

## 2019-03-01 ENCOUNTER — OFFICE VISIT (OUTPATIENT)
Dept: HEMATOLOGY | Age: 59
End: 2019-03-01

## 2019-03-01 VITALS
DIASTOLIC BLOOD PRESSURE: 83 MMHG | BODY MASS INDEX: 23.49 KG/M2 | TEMPERATURE: 97 F | OXYGEN SATURATION: 97 % | SYSTOLIC BLOOD PRESSURE: 177 MMHG | HEART RATE: 56 BPM | WEIGHT: 150 LBS

## 2019-03-01 DIAGNOSIS — B18.1 CHRONIC HEPATITIS B (HCC): Primary | ICD-10-CM

## 2019-03-01 NOTE — PROGRESS NOTES
Reviewed with patient and son at the time of office visit, no new concerns. Will repeat AFP today and CT in 6 months.

## 2019-03-01 NOTE — PROGRESS NOTES
1. Have you been to the ER, urgent care clinic since your last visit? Hospitalized since your last visit? No    2. Have you seen or consulted any other health care providers outside of the [address] [place] [address] since your last visit? Include any pap smears or colon screening. No   Chief Complaint   Patient presents with    Follow-up     Visit Vitals  /87 (BP 1 Location: Left arm, BP Patient Position: Sitting)   Pulse (!) 56   Temp 97 °F (36.1 °C) (Tympanic)   Wt 150 lb (68 kg)   SpO2 97%   BMI 23.49 kg/m²     3 most recent PHQ Screens 3/1/2019   Little interest or pleasure in doing things Not at all   Feeling down, depressed, irritable, or hopeless Not at all   Total Score PHQ 2 0     Learning Assessment 3/1/2019   PRIMARY LEARNER Patient   HIGHEST LEVEL OF EDUCATION - PRIMARY LEARNER  -   BARRIERS PRIMARY LEARNER [address] CAREGIVER Yes   CO-LEARNER NAME -   Tony Looney (COMMENT)   PRIMARY LANGUAGE [facility] -   ANSWERED BY patient   RELATIONSHIP SELF   ASSESSMENT COMMENT -     Abuse Screening Questionnaire 3/1/2019   Do you ever feel afraid of your partner? N   Are you in a relationship with someone who physically or mentally threatens you? N   Is it safe for you to go home? Y     Fall Risk Assessment, last 12 mths 3/1/2019   Able to walk? Yes   Fall in past 12 months?  No

## 2019-03-01 NOTE — PROGRESS NOTES
70 Jamil Hanna MD, [address], [facility], [place]       April MAICOL Milligan PA-C Iverson Duff, ACNP-BC   MAICOL Key NP [address] [facility] [address]    at [facility]    [address] [facility], [address] [place] [address], [facility] 22.    [phone]    FAX: [address] [place] [address]    at [address], [address]    [phone]    FAX: [phone]     Patient Care Team:  Meka Rodríguez MD as PCP - General (Family Practice)  Ashley Jhaveri MD (Cardiology)  Quang Parkinson MD (General Surgery)  Barber Quintana RN as Nurse Navigator      Patient Active Problem List   Diagnosis Code    Chronic hepatitis B ([address] B18.1    Liver mass, right lobe R16.0    Pulmonary nodule R91.1    Hepatocellular carcinoma ([address] C22.0    Liver hematoma, infected H11.107Q    H/O resection of liver Z90.49       Clarence Guan returns to the [address] for management of [address] and chronic HBV. The active problem list, all pertinent past medical history, medications, radiologic findings and laboratory findings related to the liver disorder were reviewed with the patient. The patient is a 60 y.o.  male from [place] who was first noted to have abnormalities in liver transaminases in 3/2016. Serologic studies demonstrated he was positive for HBV and ASMA. Assessment of liver fibrosis with Fibroscan and surgical resection suggested stage 3 bridging fibrosis. He was started on Viread in 6/2016. The last HBV DNA was undetectable and he has continued to tolerate this medication well and has had no issues with ongoing access to this medication. He developed [address] in segment 8, right lobe in 11/2016.   This was treated with surgical resection in 1/2017. He has undergone regular surveillance for [facility] following surgical resection. A CT scan in 2/2018 demonstrated a new 1.1 cm lesion in segment 7, right lobe not adjacent to the previous resection site. We had presented him with treatment options for recurrent HCC, he elected at that time to proceed with RFA. He was not open to transplant at that time, but relates that he might consider this as an option in the future if needed. He has been trying to cut back on cigarette use, but is still smoking heavily and drinking a few beers \"every once in a while. \"    Patient underwent uneventful RFA of the segment 7 lesion on 3/30/2018. His last follow-up imaging in 2/2019 showed adequate ablation, similar in appearance and no evidence of new hepatic masses/lesions. This marks one year without recurrence. The patient notes persistent pain in the right side over the liver since the liver resection, this has not gotten any worse. The patient has not experienced fatigue. The patient completes all daily activities without any functional limitations. His only complaint is of some increased frequency of bowel movements, diarrhea, over the course of the past 5-6 months. He relates this temporally with when he was switched to generic tenofovir. ALLERGIES  Allergies   Allergen Reactions    Venom-Honey Bee Anaphylaxis       MEDICATIONS  Current Outpatient Medications   Medication Sig    tenofovir DISOPROXIL FUMARATE (VIREAD) 300 mg tablet TAKE 1 TABLET BY MOUTH ONCE DAILY    hydroCHLOROthiazide (HYDRODIURIL) 25 mg tablet Take 1 Tab by mouth daily. Indications: hypertension     No current facility-administered medications for this visit. SYSTEM REVIEW NOT RELATED TO LIVER DISEASE OR REVIEWED ABOVE:  Constitution systems: Negative for fever, chills, weight gain, weight loss. Eyes: Negative for visual changes. ENT: Negative for sore throat, painful swallowing. Respiratory: Negative for cough, hemoptysis, SOB. Cardiology: Negative for chest pain, palpitations. GI:  Negative for constipation or diarrhea. Some achy/pulling pain inferior to incision, persists - but to a much less extent. : Negative for urinary frequency, dysuria, hematuria, nocturia. Skin: Negative for rash. Diminished sensitivity of the skin overlying the right anterior thigh since surgery. Hematology: Negative for easy bruising, blood clots. Musculo-skeletal: Negative for back pain, muscle pain, weakness. Neurologic: Negative for headaches, dizziness, vertigo, memory problems not related to HE. Psychology: Negative for anxiety, depression. FAMILY HISTORY:  The father  of lung cancer. The patient has no knowledge of the mother's medical condition. There is no family history of liver disease. SOCIAL HISTORY:  The patient is . The patient has 5 children, and 3 grandchildren. The patient currently smokes less than quarter pack of tobacco daily. He is trying to cut back on cigarettes in general.  The patient has previously consumed alcohol in excess. He now consumes alcoholic beverages on weekends. This represents a significant reduction for him. The patient currently works full time as a . PHYSICAL EXAMINATION:  Visit Vitals  /87 (BP 1 Location: Left arm, BP Patient Position: Sitting)   Pulse (!) 56   Temp 97 °F (36.1 °C) (Tympanic)   Wt 150 lb (68 kg)   SpO2 97%   BMI 23.49 kg/m²     General: No acute distress. Eyes: Sclera anicteric. ENT: No oral lesions. Thyroid normal.  Nodes: No adenopathy. Skin: No spider angiomata. No jaundice. No palmar erythema. Respiratory: Lungs clear to auscultation. Cardiovascular: Regular heart rate. No murmurs. No JVD. Abdomen: 9-10 cm RUQ incisional scar, well-approximated without keloid or hernia. No surrounding erythema.   Tender with palpation along the inferior border of the liver, which is prominent. Small, reducible umbilical hernia. Spleen not palpable. No obvious ascites. Extremities: No edema. No muscle wasting. No gross arthritic changes. Positive for significant varicose veins on the LLE. Neurologic: Alert and oriented. Cranial nerves grossly intact. No asterixis. LABORATORY STUDIES:  L[facility] [place] of [address]  [address] Units 11/1/2018 7/30/2018   WBC 3.4 - 10.8 x10E3/uL 6.4 6.8   ANC 1.8 - 8.0 K/UL     HGB 13.0 - 17.7 g/dL 14.8 15.5    - 379 x10E3/uL 236 238   INR 0.8 - 1.2  1.0   AST 0 - 40 IU/L 82 (H) 57 (H)   ALT 0 - 44 IU/L 34 31   Alk Phos 39 - 117 IU/L 74 65   Bili, Total 0.0 - 1.2 mg/dL 0.4 0.3   Bili, Direct 0.00 - 0.40 mg/dL 0.13 0.12   Albumin 3.5 - 5.5 g/dL 4.6 4.9   BUN 6 - 24 mg/dL 9 10   Creat 0.76 - 1.27 mg/dL 0.97 0.96   Na 134 - 144 mmol/L 140 136   K 3.5 - 5.2 mmol/L 5.0 5.1   Cl 96 - 106 mmol/L 98 98   CO2 20 - 29 mmol/L 25 22   Glucose 65 - 99 mg/dL 100 (H) 98   Magnesium 1.6 - 2.4 mg/dL       Liver [place] of [address] [place] [address] Ref Rng & Units 3/21/2018   WBC 3.4 - 10.8 x10E3/uL 8.3   ANC 1.8 - 8.0 K/UL    HGB 13.0 - 17.7 g/dL 14.8    - 379 x10E3/uL 197   INR 0.8 - 1.2 1.0   AST 0 - 40 IU/L    ALT 0 - 44 IU/L    Alk Phos 39 - 117 IU/L    Bili, Total 0.0 - 1.2 mg/dL    Bili, Direct 0.00 - 0.40 mg/dL    Albumin 3.5 - 5.5 g/dL    BUN 6 - 24 mg/dL 18   Creat 0.76 - 1.27 mg/dL 1.26   Na 134 - 144 mmol/L 139   K 3.5 - 5.2 mmol/L 4.8   Cl 96 - 106 mmol/L 106   CO2 20 - 29 mmol/L 30   Glucose 65 - 99 mg/dL 95   Magnesium 1.6 - 2.4 mg/dL      Cancer Screening Latest Ref Rng & Units 11/1/2018 7/30/2018 1/29/2018   AFP, Serum 0.0 - 8.0 ng/mL 1.7 1.7 6.4   AFP-L3% 0.0 - 9.9 % Comment Comment 58.6 (H)     Virology Latest Ref Rng & Units 11/1/2018 7/30/2018 1/29/2018   HBV DNA IU/mL HBV DNA not detected <10 <10   Additional lab values drawn at today's office visit are pending at the time of documentation.     SEROLOGIES:  Serologies Latest Ref Rng 6/10/2016 4/22/2016   Hep A Ab, Total Negative  Positive (A)   Hep B Surface Ag Negative  Positive (A)   Hep B Core Ab, Total Negative  Positive (A)   Hep B Surface AB QL   Non Reactive   Hep C Ab 0.0 - 0.9 s/co ratio  0.1   Hep D AB Not Detected Not Detected    Ferritin 30 - 400 ng/mL  359   Iron % Saturation 15 - 55 %  76 (HH)   AKIN, IFA   Negative   ASMCA 0 - 19 Units  83 (H)   Alpha-1 antitrypsin level 90 - 200 mg/dL  176     6/2016. HIV Ab negative. HBeAg negative, HBeAb positive. LIVER HISTOLOGY:  6/2016. FibroScan performed at [address]. EkPa was 11.8. IQR/med 26%. The results suggested a fibrosis level of F3.  1/2017. Liver resection. 2.2 cm HCC, moderately differentiated with free margins. Adjacent liver shows chronic hepatitis with bridging fibrosis (grade 2, stage 3)    ENDOSCOPIC PROCEDURES:  Not available or performed    RADIOLOGY:  3/2016. Ultrasound of liver. Normal appearing liver. Single hyperecholic mass in the right lobe measuring 1.5 cm. Consistent with hemangioma. 5/2016. CT scan abdomen with and without IV contrast. 1.9 cm hemangioma in the right hepatic lobe. No additional liver lesion. 11/2016. Ultrasound of liver. Increase in size of the right lobe hepatic mass. CT scan or MRI recommended. 11/2016. CT scan abdomen. Interval enlargement of hypervascular segment 8 liverlesion now 2.5 x 1.8 x 3.1 cm. In the setting of hepatitis B, despite lack of CT evidence of cirrhosis, hepatocellular carcinoma is more likely than an unusual presentation of a benign lesion such as atypical hemangioma. 3/2017. CT of abdomen. No residual enhancing tumor  Gas within the right lobe resection site is unexpected 2 months postoperatively. Surgical packing material, abscess, and less likely retained postoperative gas, are possible. 4/2017. CT abdomen.   No CT evidence of locally recurrent hepatocellular carcinoma, metastatic disease, or new lesions within cirrhotic appearing liver. Resolution of fluid collection. 2/2018. CT scan abdomen with and without IV contrast.  Changes consistent with chronic liver disease. New 1.1 cm enhancing liver mass with washout and rim enhancement in segment 7, right lobe. No dilated bile ducts. No ascites. 8/2018. CT abdomen. Wide ablation zone around the segment 6-7 junction HCC. No new hepatic masses. Early arterial phase limits sensitivity for small hypervascular masses. 2/2019. CT abdomen and chest.  No sign of pulmonary nodule concern. Low-attenuation ablation defect in the periphery of the right lobe of liver in segment 7 slightly increased to equivocal in size. There is no evidence of recurrent or new disease within the liver. OTHER TESTING:  3/2018. RFA treatment to segment 7 lesion. ASSESSMENT AND PLAN:  Chronic HBV with bridging fibrosis. Liver function is normal.  The platelet count is normal.    Chronic E-antigen negative HBV with undetectable HBV DNA on tenofovir. He continues to tolerate this well, but has had increased diarrhea with generic form, will try to [facility] authorization. He understands that this will be a life-long medication pending loss of Hep B Surface Ag. [address] treated with surgical resection in 1/2017. Recurrence of [address] in 2/2018. The patient had elected to treat with RFA in 3/2018. We have had negative repeat CT scans every 3 months through this first year. Plan to reduce to every 6 months. He is without new physical complaints. I have reviewed with the patient options for treatment/transplant if recurrence were to occur. Patient would entertain transplant in the future. He is making an attempt to quit cigarettes and has largely eliminated alcohol. We could also consider use of oral anti-tumor agents as needed.       Pulmonary nodules on chest CT have not changed since 11/2016 and none described on 2/2019 CT of the chest.     The patient was directed to continue all current medications at the current dosages. There are no contraindications for the patient to take any medications that are necessary for treatment of other medical issues. The patient was advised to remain abstinent from alcohol. Vaccination for viral hepatitis A is not needed. The patient has serologic evidence of prior exposure or vaccination with immunity. All of the above issues were discussed with the patient. All questions were answered. The patient expressed a clear understanding of the above. [address] [address] in 6 months with CT of the liver at that time.     Qi Genao PA-C  Liver [place] [address], [address] [address]  22. [phone]

## 2019-03-02 LAB
ALBUMIN SERPL-MCNC: 4.7 G/DL (ref 3.6–4.8)
ALP SERPL-CCNC: 71 IU/L (ref 39–117)
ALT SERPL-CCNC: 16 IU/L (ref 0–44)
AST SERPL-CCNC: 27 IU/L (ref 0–40)
BILIRUB DIRECT SERPL-MCNC: 0.13 MG/DL (ref 0–0.4)
BILIRUB SERPL-MCNC: 0.3 MG/DL (ref 0–1.2)
BUN SERPL-MCNC: 9 MG/DL (ref 8–27)
BUN/CREAT SERPL: 8 (ref 10–24)
CALCIUM SERPL-MCNC: 9.4 MG/DL (ref 8.6–10.2)
CHLORIDE SERPL-SCNC: 103 MMOL/L (ref 96–106)
CO2 SERPL-SCNC: 25 MMOL/L (ref 20–29)
CREAT SERPL-MCNC: 1.16 MG/DL (ref 0.76–1.27)
ERYTHROCYTE [DISTWIDTH] IN BLOOD BY AUTOMATED COUNT: 14.2 % (ref 12.3–15.4)
GLUCOSE SERPL-MCNC: 95 MG/DL (ref 65–99)
HBV SURFACE AB SER QL: NON REACTIVE
HBV SURFACE AG SERPL QL IA: POSITIVE
HCT VFR BLD AUTO: 46.1 % (ref 37.5–51)
HGB BLD-MCNC: 15.5 G/DL (ref 13–17.7)
INR PPP: 1 (ref 0.8–1.2)
MCH RBC QN AUTO: 33.9 PG (ref 26.6–33)
MCHC RBC AUTO-ENTMCNC: 33.6 G/DL (ref 31.5–35.7)
MCV RBC AUTO: 101 FL (ref 79–97)
PLATELET # BLD AUTO: 229 X10E3/UL (ref 150–379)
POTASSIUM SERPL-SCNC: 5.2 MMOL/L (ref 3.5–5.2)
PROTHROMBIN TIME: 10.7 SEC (ref 9.1–12)
RBC # BLD AUTO: 4.57 X10E6/UL (ref 4.14–5.8)
SODIUM SERPL-SCNC: 142 MMOL/L (ref 134–144)
WBC # BLD AUTO: 5.8 X10E3/UL (ref 3.4–10.8)

## 2019-03-03 LAB
HBV DNA SERPL NAA+PROBE-ACNC: NORMAL IU/ML
HBV DNA SERPL NAA+PROBE-LOG IU: NORMAL LOG10 IU/ML
REF LAB TEST REF RANGE: NORMAL

## 2019-03-04 LAB
AFP L3 MFR SERPL: NORMAL % (ref 0–9.9)
AFP SERPL-MCNC: 1.8 NG/ML (ref 0–8)

## 2019-03-05 NOTE — PROGRESS NOTES
Pt notified of stable findings and continued suppressed virus. Will plan to remain on tenofovir and follow-up in 6 months with repeat CT scan as scheduled.

## 2019-09-03 ENCOUNTER — OFFICE VISIT (OUTPATIENT)
Dept: HEMATOLOGY | Age: 59
End: 2019-09-03

## 2019-09-03 VITALS
TEMPERATURE: 97.4 F | DIASTOLIC BLOOD PRESSURE: 90 MMHG | WEIGHT: 149 LBS | SYSTOLIC BLOOD PRESSURE: 182 MMHG | HEART RATE: 71 BPM | OXYGEN SATURATION: 97 % | BODY MASS INDEX: 23.34 KG/M2

## 2019-09-03 DIAGNOSIS — B18.1 CHRONIC HEPATITIS B (HCC): Primary | ICD-10-CM

## 2019-09-03 DIAGNOSIS — C22.0 HEPATOCELLULAR CARCINOMA (HCC): ICD-10-CM

## 2019-09-03 RX ORDER — TENOFOVIR DISOPROXIL FUMARATE 300 MG/1
TABLET, FILM COATED ORAL
Qty: 30 TAB | Refills: 10 | Status: SHIPPED | OUTPATIENT
Start: 2019-09-03 | End: 2020-01-06 | Stop reason: ALTCHOICE

## 2019-09-03 NOTE — PROGRESS NOTES
1. Have you been to the ER, urgent care clinic since your last visit? Hospitalized since your last visit? No    2. Have you seen or consulted any other health care providers outside of the [address] [place] [address] since your last visit? Include any pap smears or colon screening. No   Chief Complaint   Patient presents with    Follow-up     Visit Vitals  /90 (BP 1 Location: Left arm, BP Patient Position: Sitting)   Pulse 71   Temp 97.4 °F (36.3 °C) (Tympanic)   Wt 149 lb (67.6 kg)   SpO2 97%   BMI 23.34 kg/m²       3 most recent PHQ Screens 9/3/2019   Little interest or pleasure in doing things Not at all   Feeling down, depressed, irritable, or hopeless Not at all   Total Score PHQ 2 0     Learning Assessment 9/3/2019   PRIMARY LEARNER Patient   HIGHEST LEVEL OF EDUCATION - PRIMARY LEARNER  -   [address] PRIMARY LEARNER [address] CAREGIVER Yes   CO-LEARNER NAME [address] LEVEL OF EDUCATION -   [address] (COMMENT)   PRIMARY LANGUAGE CO-LEARNER -    NEED -   LEARNER PREFERENCE PRIMARY LISTENING   LEARNER PREFERENCE CO-LEARNER -   LEARNING SPECIAL TOPICS -   ANSWERED BY patient   RELATIONSHIP SELF   ASSESSMENT COMMENT -     Abuse Screening Questionnaire 9/3/2019   Do you ever feel afraid of your partner? N   Are you in a relationship with someone who physically or mentally threatens you? N   Is it safe for you to go home? Y     Fall Risk Assessment, last 12 mths 9/3/2019   Able to walk? Yes   Fall in past 12 months?  No

## 2019-09-03 NOTE — PROGRESS NOTES
[address] [place] [address], Guillermo CROCKETT Madge Bleak, MD Virgene Latus, [facility], [facility]     Queta Alva, [facility]   Ayesha Truong, [facility]    Shelton Quach, [facility]       Noel Johnmarcy Lentz Shields 136    at [address], [address] [place] [address], [facility] 22.    [phone]    FAX: [address] [place] [address], [address]    [phone]    FAX: [phone]     Patient Care Team:  Peg De Leon MD as PCP - General (Family Practice)  Sandro Bland MD (Cardiology)  Renny Downing MD (General Surgery)  Cornell Murphy RN as Nurse Navigator      Patient Active Problem List   Diagnosis Code    Chronic hepatitis B ([address] B18.1    Liver mass, right lobe R16.0    Pulmonary nodule R91.1    Hepatocellular carcinoma ([address] C22.0    Liver hematoma, infected A82.495N    H/O resection of liver Z90.49       Jaclyn Swain returns to the [address] for management of [address] and chronic HBV. The active problem list, all pertinent past medical history, medications, radiologic findings and laboratory findings related to the liver disorder were reviewed with the patient. The patient is a 60 y.o.  male from [place] who was first noted to have abnormalities in liver transaminases in 3/2016. Serologic studies demonstrated he was positive for HBV and ASMA. Assessment of liver fibrosis with Fibroscan and surgical resection suggested stage 3 bridging fibrosis. He was started on Viread in 6/2016. The last HBV DNA was undetectable and he has continued to tolerate this medication well and has had no issues with ongoing access to this medication.      He developed [address] in segment 8, right lobe in 11/2016. This was treated with surgical resection in 1/2017. He has undergone regular surveillance for [facility] following surgical resection. A CT scan in 2/2018 demonstrated a new 1.1 cm lesion in segment 7, right lobe not adjacent to the previous resection site. We had presented him with treatment options for recurrent HCC, he elected at that time to proceed with RFA. He was not open to transplant at that time, but relates that he might consider this as an option in the future if needed. He has been trying to cut back on cigarette use, but is still smoking heavily and drinking a few beers \"every once in a while. \"    Patient underwent uneventful RFA of the segment 7 lesion on 3/30/2018. His last follow-up imaging in 2/2019 showed adequate ablation, similar in appearance and no evidence of new hepatic masses/lesions. This marks one year without recurrence. He is now due for repeat imaging, this has not yet been scheduled. The patient notes persistent pain in the right side over the liver since the liver resection, this has not gotten any worse. The patient has not experienced fatigue. The patient completes all daily activities without any functional limitations. He otherwise has no new complaints. His son has noted that he has had increased frequency of coughing episodes. ALLERGIES  Allergies   Allergen Reactions    Venom-Honey Bee Anaphylaxis       MEDICATIONS  Current Outpatient Medications   Medication Sig    tenofovir ALAFENAMIDE FUMARATE (VEMLIDY) 25 mg tab Take 25 mg by mouth daily.  tenofovir DISOPROXIL FUMARATE (VIREAD) 300 mg tablet TAKE 1 TABLET BY MOUTH ONCE DAILY    hydroCHLOROthiazide (HYDRODIURIL) 25 mg tablet Take 1 Tab by mouth daily. Indications: hypertension     No current facility-administered medications for this visit. SYSTEM REVIEW NOT RELATED TO LIVER DISEASE OR REVIEWED ABOVE:  Constitution systems: Negative for fever, chills, weight gain, weight loss.    Eyes: Negative for visual changes. ENT: Negative for sore throat, painful swallowing. Respiratory: Negative for cough, hemoptysis, SOB. Cardiology: Negative for chest pain, palpitations. GI:  Negative for constipation or diarrhea. Some achy/pulling pain inferior to incision, persists - but to a much less extent. : Negative for urinary frequency, dysuria, hematuria, nocturia. Skin: Negative for rash. Diminished sensitivity of the skin overlying the right anterior thigh since surgery. Hematology: Negative for easy bruising, blood clots. Musculo-skeletal: Negative for back pain, muscle pain, weakness. Neurologic: Negative for headaches, dizziness, vertigo, memory problems not related to HE. Psychology: Negative for anxiety, depression. FAMILY HISTORY:  The father  of lung cancer. The patient has no knowledge of the mother's medical condition. There is no family history of liver disease. SOCIAL HISTORY:  The patient is . The patient has 5 children, and 3 grandchildren. The patient currently smokes less than quarter pack of tobacco daily. He is trying to cut back on cigarettes in general.  The patient has previously consumed alcohol in excess. He now consumes alcoholic beverages on weekends, 1-2 times a month. This represents a significant reduction for him. The patient currently works full time as a . PHYSICAL EXAMINATION:  Visit Vitals  /90 (BP 1 Location: Left arm, BP Patient Position: Sitting)   Pulse 71   Temp 97.4 °F (36.3 °C) (Tympanic)   Wt 149 lb (67.6 kg)   SpO2 97%   BMI 23.34 kg/m²     General: No acute distress. Eyes: Sclera anicteric. ENT: No oral lesions. Thyroid normal.  Nodes: No adenopathy. Skin: No spider angiomata. No jaundice. No palmar erythema. Respiratory: Lungs clear to auscultation. Cardiovascular: Regular heart rate. No murmurs. No JVD.   Abdomen: 9-10 cm RUQ incisional scar, well-approximated without keloid or hernia. No surrounding erythema. Tender with palpation along the inferior border of the liver, which is prominent. Small, reducible umbilical hernia. Spleen not palpable. No obvious ascites. Extremities: No edema. No muscle wasting. No gross arthritic changes. Positive for significant varicose veins on the LLE. Neurologic: Alert and oriented. Cranial nerves grossly intact. No asterixis. LABORATORY STUDIES:  [facility] [place] of [address] Units 3/1/2019 11/1/2018 7/30/2018   WBC 3.4 - 10.8 x10E3/uL 5.8 6.4 6.8   ANC 1.8 - 8.0 K/UL      HGB 13.0 - 17.7 g/dL 15.5 14.8 15.5    - 379 x10E3/uL 229 236 238   INR 0.8 - 1.2 1.0  1.0   AST 0 - 40 IU/L 27 82 (H) 57 (H)   ALT 0 - 44 IU/L 16 34 31   Alk Phos 39 - 117 IU/L 71 74 65   Bili, Total 0.0 - 1.2 mg/dL 0.3 0.4 0.3   Bili, Direct 0.00 - 0.40 mg/dL 0.13 0.13 0.12   Albumin 3.6 - 4.8 g/dL 4.7 4.6 4.9   BUN 8 - 27 mg/dL 9 9 10   Creat 0.76 - 1.27 mg/dL 1.16 0.97 0.96   Na 134 - 144 mmol/L 142 140 136   K 3.5 - 5.2 mmol/L 5.2 5.0 5.1   Cl 96 - 106 mmol/L 103 98 98   CO2 20 - 29 mmol/L 25 25 22   Glucose 65 - 99 mg/dL 95 100 (H) 98   Magnesium 1.6 - 2.4 mg/dL        Cancer Screening Latest Ref Rng & Units 3/1/2019 11/1/2018 7/30/2018   AFP, Serum 0.0 - 8.0 ng/mL 1.8 1.7 1.7   AFP-L3% 0.0 - 9.9 % Comment Comment Comment     Virology Latest Ref Rng & Units 3/1/2019 11/1/2018   HBV DNA IU/mL HBV DNA not detected HBV DNA not detected     Virology Latest Ref Rng & Units 7/30/2018   HBV DNA IU/mL <10   Additional lab values drawn at today's office visit are pending at the time of documentation.     SEROLOGIES:  Serologies Latest Ref Rng 6/10/2016 4/22/2016   Hep A Ab, Total Negative  Positive (A)   Hep B Surface Ag Negative  Positive (A)   Hep B Core Ab, Total Negative  Positive (A)   Hep B Surface AB QL   Non Reactive   Hep C Ab 0.0 - 0.9 s/co ratio  0.1   Hep D AB Not Detected Not Detected    Ferritin 30 - 400 ng/mL  359   Iron % Saturation 15 - 55 %  76 (HH)   AKIN, IFA   Negative   ASMCA 0 - 19 Units  83 (H)   Alpha-1 antitrypsin level 90 - 200 mg/dL  176   6/2016. HIV Ab negative. HBeAg negative, HBeAb positive. LIVER HISTOLOGY:  6/2016. FibroScan performed at [address] EkPa was 11.8. IQR/med 26%. The results suggested a fibrosis level of F3.  1/2017. Liver resection. 2.2 cm HCC, moderately differentiated with free margins. Adjacent liver shows chronic hepatitis with bridging fibrosis (grade 2, stage 3)    ENDOSCOPIC PROCEDURES:  Not available or performed    RADIOLOGY:  3/2016. Ultrasound of liver. Normal appearing liver. Single hyperecholic mass in the right lobe measuring 1.5 cm. Consistent with hemangioma. 5/2016. CT scan abdomen with and without IV contrast. 1.9 cm hemangioma in the right hepatic lobe. No additional liver lesion. 11/2016. Ultrasound of liver. Increase in size of the right lobe hepatic mass. CT scan or MRI recommended. 11/2016. CT scan abdomen. Interval enlargement of hypervascular segment 8 liverlesion now 2.5 x 1.8 x 3.1 cm. In the setting of hepatitis B, despite lack of CT evidence of cirrhosis, hepatocellular carcinoma is more likely than an unusual presentation of a benign lesion such as atypical hemangioma. 3/2017. CT of abdomen. No residual enhancing tumor  Gas within the right lobe resection site is unexpected 2 months postoperatively. Surgical packing material, abscess, and less likely retained postoperative gas, are possible. 4/2017. CT abdomen. No CT evidence of locally recurrent hepatocellular carcinoma, metastatic disease, or new lesions within cirrhotic appearing liver. Resolution of fluid collection. 2/2018. CT scan abdomen with and without IV contrast.  Changes consistent with chronic liver disease. New 1.1 cm enhancing liver mass with washout and rim enhancement in segment 7, right lobe. No dilated bile ducts. No ascites. 8/2018. CT abdomen.   Wide ablation zone around the segment 6-7 junction HCC. No new hepatic masses. Early arterial phase limits sensitivity for small hypervascular masses. 2/2019. CT abdomen and chest.  No sign of pulmonary nodule concern. Low-attenuation ablation defect in the periphery of the right lobe of liver in segment 7 slightly increased to equivocal in size. There is no evidence of recurrent or new disease within the liver. OTHER TESTING:  3/2018. RFA treatment to segment 7 lesion. ASSESSMENT AND PLAN:  Chronic HBV with bridging fibrosis. Liver function is normal.  The platelet count is normal.    Chronic E-antigen negative HBV with undetectable HBV DNA on tenofovir. He continues to tolerate this well. He understands that this will be a life-long medication pending loss of Hep B Surface Ag. [address] treated with surgical resection in 1/2017. Recurrence of [address] in 2/2018. The patient had elected to treat with RFA in 3/2018. We have had negative repeat CT scans every 3 months through this first year. Plan to reduce to every 6 months and his is now due for repeat imaging. I have given him the number elijah to have this scheduled in the near future and will continue to monitor AFP values. He is without new physical complaints. I have reviewed with the patient options for treatment/transplant if recurrence were to occur. Patient would entertain transplant in the future. He is making an attempt to quit cigarettes and has largely eliminated alcohol. We could also consider use of oral anti-tumor agents as needed. Pulmonary nodules on chest CT have not changed since 11/2016 and none described on 2/2019 CT of the chest.  Will continue to monitor. I have again stressed the importance of smoking cessation. The patient was directed to continue all current medications at the current dosages. There are no contraindications for the patient to take any medications that are necessary for treatment of other medical issues.     The patient was advised to remain abstinent from alcohol. He is continuing to drink alcohol on rare occasions. Vaccination for viral hepatitis A is not needed. The patient has serologic evidence of prior exposure or vaccination with immunity. All of the above issues were discussed with the patient. All questions were answered. The patient expressed a clear understanding of the above. [address] [id number] in 6 months with CT of the abdomen/chest in the meantime.      Josselyn Osman PA-C  [facility] [place] of [address] [place] [address], [address]  22. [phone]

## 2019-09-04 LAB
AFP L3 MFR SERPL: NORMAL % (ref 0–9.9)
AFP SERPL-MCNC: 1.8 NG/ML (ref 0–8)
ALBUMIN SERPL-MCNC: 5 G/DL (ref 3.6–4.8)
ALP SERPL-CCNC: 74 IU/L (ref 39–117)
ALT SERPL-CCNC: 32 IU/L (ref 0–44)
AST SERPL-CCNC: 62 IU/L (ref 0–40)
BILIRUB DIRECT SERPL-MCNC: 0.2 MG/DL (ref 0–0.4)
BILIRUB SERPL-MCNC: 0.6 MG/DL (ref 0–1.2)
BUN SERPL-MCNC: 8 MG/DL (ref 8–27)
BUN/CREAT SERPL: 9 (ref 10–24)
CALCIUM SERPL-MCNC: 9.3 MG/DL (ref 8.6–10.2)
CHLORIDE SERPL-SCNC: 90 MMOL/L (ref 96–106)
CO2 SERPL-SCNC: 25 MMOL/L (ref 20–29)
CREAT SERPL-MCNC: 0.89 MG/DL (ref 0.76–1.27)
ERYTHROCYTE [DISTWIDTH] IN BLOOD BY AUTOMATED COUNT: 14.8 % (ref 12.3–15.4)
GLUCOSE SERPL-MCNC: 103 MG/DL (ref 65–99)
HBV DNA SERPL NAA+PROBE-ACNC: NORMAL IU/ML
HBV DNA SERPL NAA+PROBE-LOG IU: NORMAL LOG10 IU/ML
HCT VFR BLD AUTO: 44.5 % (ref 37.5–51)
HGB BLD-MCNC: 15.3 G/DL (ref 13–17.7)
INR PPP: 1 (ref 0.8–1.2)
MCH RBC QN AUTO: 34.3 PG (ref 26.6–33)
MCHC RBC AUTO-ENTMCNC: 34.4 G/DL (ref 31.5–35.7)
MCV RBC AUTO: 100 FL (ref 79–97)
PLATELET # BLD AUTO: 244 X10E3/UL (ref 150–450)
POTASSIUM SERPL-SCNC: 4.1 MMOL/L (ref 3.5–5.2)
PROTHROMBIN TIME: 11 SEC (ref 9.1–12)
RBC # BLD AUTO: 4.46 X10E6/UL (ref 4.14–5.8)
REF LAB TEST REF RANGE: NORMAL
SODIUM SERPL-SCNC: 133 MMOL/L (ref 134–144)
WBC # BLD AUTO: 10.1 X10E3/UL (ref 3.4–10.8)

## 2019-09-05 NOTE — PROGRESS NOTES
Patient notified of stable lab findings, follow-up as scheduled and pt to call for scheduling of CT scan.

## 2019-09-14 ENCOUNTER — HOSPITAL ENCOUNTER (OUTPATIENT)
Dept: CT IMAGING | Age: 59
Discharge: HOME | End: 2019-09-14
Attending: PHYSICIAN ASSISTANT
Payer: COMMERCIAL

## 2019-09-14 DIAGNOSIS — B18.1 CHRONIC HEPATITIS B (HCC): ICD-10-CM

## 2019-09-14 DIAGNOSIS — C22.0 HEPATOCELLULAR CARCINOMA (HCC): ICD-10-CM

## 2019-09-14 PROCEDURE — 71260 CT THORAX DX C+: CPT

## 2019-09-14 PROCEDURE — 74170 CT ABD WO CNTRST FLWD CNTRST: CPT

## 2019-09-14 PROCEDURE — 74011636320 HC RX REV CODE- 636/320: Performed by: PHYSICIAN ASSISTANT

## 2019-09-14 RX ORDER — SODIUM CHLORIDE 0.9 % (FLUSH) 0.9 %
10 SYRINGE (ML) INJECTION
Status: COMPLETED | OUTPATIENT
Start: 2019-09-14 | End: 2019-09-14

## 2019-09-14 RX ADMIN — IOPAMIDOL 100 ML: 755 INJECTION, SOLUTION INTRAVENOUS at 12:00

## 2019-09-14 RX ADMIN — Medication 10 ML: at 12:00

## 2019-09-17 ENCOUNTER — TELEPHONE (OUTPATIENT)
Dept: HEMATOLOGY | Age: 59
End: 2019-09-17

## 2019-09-17 DIAGNOSIS — C22.0 HEPATOCELLULAR CARCINOMA (HCC): Primary | ICD-10-CM

## 2019-09-17 NOTE — PROGRESS NOTES
Reviewed imaging with IR. Concerning for recurrent HCC, lesion would be a good candidate for selective Y-90. Patient (son, Gianfranco Gresham) notified of findings and plan. Will set up consultation with IR.

## 2019-09-18 ENCOUNTER — PATIENT OUTREACH (OUTPATIENT)
Dept: HEMATOLOGY | Age: 59
End: 2019-09-18

## 2019-09-18 NOTE — PROGRESS NOTES
Phone call placed to patient's son, Bere French. Reviewed plan for Y-90 to treat recurrence of liver cancer. Advised him consult appointments have been scheduled with Dr. Charlie Garcia and radiation oncologist on 9/23/19. Asked that patient check in at [facility] Admitting at 11:45 am. [facility] and scheduled follow up with Gretel Brito PA-C on 10/30/19.

## 2019-09-23 ENCOUNTER — HOSPITAL ENCOUNTER (OUTPATIENT)
Dept: INTERVENTIONAL RADIOLOGY/VASCULAR | Age: 59
Discharge: HOME | End: 2019-09-23
Attending: RADIOLOGY | Admitting: RADIOLOGY

## 2019-09-23 DIAGNOSIS — C22.0 HEPATOCELLULAR CARCINOMA (HCC): ICD-10-CM

## 2019-09-26 DIAGNOSIS — C22.0 HEPATOCELLULAR CARCINOMA (HCC): Primary | ICD-10-CM

## 2019-09-30 ENCOUNTER — PATIENT OUTREACH (OUTPATIENT)
Dept: HEMATOLOGY | Age: 59
End: 2019-09-30

## 2019-09-30 NOTE — PROGRESS NOTES
Phone call placed to patient's son, Boone Aguilar. Received VM. Left message advising him the patient should arrive at 10 am tomorrow per the angio department.

## 2019-10-01 ENCOUNTER — HOSPITAL ENCOUNTER (OUTPATIENT)
Dept: NUCLEAR MEDICINE | Age: 59
Discharge: HOME | End: 2019-10-01
Attending: PHYSICIAN ASSISTANT
Payer: COMMERCIAL

## 2019-10-01 ENCOUNTER — HOSPITAL ENCOUNTER (OUTPATIENT)
Dept: INTERVENTIONAL RADIOLOGY/VASCULAR | Age: 59
Discharge: HOME | End: 2019-10-01
Attending: PHYSICIAN ASSISTANT
Payer: COMMERCIAL

## 2019-10-01 VITALS
TEMPERATURE: 98.4 F | SYSTOLIC BLOOD PRESSURE: 180 MMHG | DIASTOLIC BLOOD PRESSURE: 87 MMHG | RESPIRATION RATE: 15 BRPM | OXYGEN SATURATION: 98 % | HEIGHT: 67 IN | HEART RATE: 57 BPM | BODY MASS INDEX: 23.39 KG/M2 | WEIGHT: 149 LBS

## 2019-10-01 DIAGNOSIS — C22.0 HEPATOCELLULAR CARCINOMA (HCC): ICD-10-CM

## 2019-10-01 DIAGNOSIS — C22.0 CARCINOMA OF LIVER (HCC): ICD-10-CM

## 2019-10-01 PROCEDURE — C1894 INTRO/SHEATH, NON-LASER: HCPCS

## 2019-10-01 PROCEDURE — 74011000250 HC RX REV CODE- 250: Performed by: RADIOLOGY

## 2019-10-01 PROCEDURE — C1769 GUIDE WIRE: HCPCS

## 2019-10-01 PROCEDURE — 77030014021 HC SYR ANGI FIX LOK MRTM -A

## 2019-10-01 PROCEDURE — 77030028837 HC SYR ANGI PWR INJ COEU -A

## 2019-10-01 PROCEDURE — 74011636320 HC RX REV CODE- 636/320: Performed by: RADIOLOGY

## 2019-10-01 PROCEDURE — 77030016711

## 2019-10-01 PROCEDURE — C1760 CLOSURE DEV, VASC: HCPCS

## 2019-10-01 PROCEDURE — 77030019698 HC SYR ANGI MDLON MRTM -A

## 2019-10-01 PROCEDURE — 78801 RP LOCLZJ TUM 2+AREA 1+D IMG: CPT

## 2019-10-01 PROCEDURE — 99152 MOD SED SAME PHYS/QHP 5/>YRS: CPT

## 2019-10-01 PROCEDURE — 74011250636 HC RX REV CODE- 250/636: Performed by: RADIOLOGY

## 2019-10-01 PROCEDURE — C1887 CATHETER, GUIDING: HCPCS

## 2019-10-01 RX ORDER — FENTANYL CITRATE 50 UG/ML
25 INJECTION, SOLUTION INTRAMUSCULAR; INTRAVENOUS
Status: DISCONTINUED | OUTPATIENT
Start: 2019-10-01 | End: 2019-10-05 | Stop reason: HOSPADM

## 2019-10-01 RX ORDER — LIDOCAINE HYDROCHLORIDE 10 MG/ML
10 INJECTION, SOLUTION EPIDURAL; INFILTRATION; INTRACAUDAL; PERINEURAL ONCE
Status: COMPLETED | OUTPATIENT
Start: 2019-10-01 | End: 2019-10-01

## 2019-10-01 RX ORDER — SODIUM CHLORIDE 9 MG/ML
25 INJECTION, SOLUTION INTRAVENOUS ONCE
Status: COMPLETED | OUTPATIENT
Start: 2019-10-01 | End: 2019-10-01

## 2019-10-01 RX ORDER — MIDAZOLAM HYDROCHLORIDE 1 MG/ML
5 INJECTION, SOLUTION INTRAMUSCULAR; INTRAVENOUS
Status: DISCONTINUED | OUTPATIENT
Start: 2019-10-01 | End: 2019-10-05 | Stop reason: HOSPADM

## 2019-10-01 RX ADMIN — SODIUM CHLORIDE 25 ML/HR: 900 INJECTION, SOLUTION INTRAVENOUS at 11:10

## 2019-10-01 RX ADMIN — IOPAMIDOL 40 ML: 612 INJECTION, SOLUTION INTRAVENOUS at 12:30

## 2019-10-01 RX ADMIN — LIDOCAINE HYDROCHLORIDE 10 ML: 10 INJECTION, SOLUTION EPIDURAL; INFILTRATION; INTRACAUDAL; PERINEURAL at 12:30

## 2019-10-01 RX ADMIN — FENTANYL CITRATE 25 MCG: 50 INJECTION INTRAMUSCULAR; INTRAVENOUS at 12:25

## 2019-10-01 RX ADMIN — MIDAZOLAM 1 MG: 1 INJECTION INTRAMUSCULAR; INTRAVENOUS at 12:25

## 2019-10-01 RX ADMIN — MIDAZOLAM 1 MG: 1 INJECTION INTRAMUSCULAR; INTRAVENOUS at 12:29

## 2019-10-01 RX ADMIN — FENTANYL CITRATE 25 MCG: 50 INJECTION INTRAMUSCULAR; INTRAVENOUS at 12:30

## 2019-10-01 NOTE — DISCHARGE INSTRUCTIONS
Side effects of sedation medications and other medications used today have been reviewed. Notify us of nausea, itching, hives, dizziness, or any unusual symptoms. Should you experience any of these significant changes, please call [phone] between the hours of 7:30 am and 10 pm or [phone] after hours. After hours, ask the  to page the X-ray Technologist, and describe the problem to the technologist.     [address]        Radiologist:  Sadie Garay MD    Date:   10-1-19    Arteriogram Procedure      Go home and rest and restrict your activity the next 24 - 48 hours, limiting the amount of walking you do. You have been given sedating medications, so do not drive or drink alcohol today. Resume your previous diet and medications. Be sure to increase your fluid intake for the next 24 hours. You may take Tylenol, as directed on the label, for pain or discomfort. Avoid Ibuprofen (Advil, Motrin etc.) and Aspirin today as they may increase your risk of bleeding. Avoid heavy lifting (nothing greater than 5 pounds),  excessive bending, and pushing or pulling movements for one week. Then begin to advance your activity as tolerated. Be sure to follow up with your physician, and let him know how you are progressing. Keep your groin dressing clean and dry. Observe the site for any bleeding. If bleeding should occur from the site, apply firm direct pressure for 15 minutes. If the bleeding can not be stopped please seek emergency medical treatment. Someone will need to drive you to the ER while you continue to hold pressure. If you have minor leg discomfort, you may use Tylenol as directed on the bottle. If new severe leg pain, numbness or tingling occurs please seek emergency medical treatment. Keep your dressing clean and dry. You may change the dressing in 3 days. Wash the area with soap and water.   Do not soak or swim until the site has healed completely. If a MYNX closure device was use, you may feel a small lump under the dressing. Do not rub or massage it. It is collegen and will dissolve on its on over the next month. Be sure to follow up with your ordering physician as previously discussed. Sergio Dixon RN        ???????? ??????? ?????????? ?????????? ? ?????? ??????????, ???????????? ???????, ???? ???????????. ???????? ??? ? ???????, ????, ??????????, ?????????????? ??? ????? ????????? ?????????.     ???? ?? ??????????? ?????-???? ?? ???? ???????????? ?????????, ??????? ?? ?????? [phone] ????? 7:30 ? 22:00 ??? [phone] ? ????????? ?????. ????? ????????? ????? ????????? ????????? ?????????? ? ???????????? ? ??????? ???????? ??? ?????????. [facility]  ????????? ?????? ?????        ????????: ????? ????????, MD    ????: 10-1-19    ????????? ?????????????      ????? ?????, ????????? ? ?????????? ???? ?????????? ? ??????? ????????? 24 - 48 ?????, ??????????? ?????????? ????????, ??????? ?? ???????.    ??? ???? ????????????? ?????????, ??????? ?? ?????? ? ?? ????? ???????? ???????.    ??????????? ???? ?????????? ????? ? ?????????. ??????????? ????????? ??????????? ???????? ? ??????? ????????? 24 ?????.    ?? ?????? ???????  Tylenol, ??? ??????? ?? ????????, ??? ???? ??? ???????????. ????????? ?????????? (Advil, Motrin ? ?. ?.) ? ???????? ???????, ????????? ??? ????? ????????? ???? ????????????.      ????????? ???????? ???????? (?? ????? 5 ??????), ?????????? ??????? ? ????????? ??? ????????????? ???????? ? ??????? ????? ??????. ????? ??????? ?????????? ???? ???????????? ??? ??????????.    ??????????? ??????????????????? ? ?????? ? ???????? ???, ??? ?? ?????????????.    ??????? ??????? ??????? ?????? ? ?????. ?????????? ???? ??? ?????? ????????????. ???? ???????????? ?????? ??????????? ? ?????, ????????? ??????? ?????? ???????? ? ??????? 15 ?????. ???? ???????????? ?? ????? ???? ???????????, ?????????? ?? ?????????? ??????????? ???????. ???-?? ?????? ????? ??????? ??? ? ????????? ?????? ??????, ???? ?? ??????????? ?????????? ????????.    ???? ? ??? ????????? ?????????? ? ????, ?? ?????? ????????????  Tylenol, ??? ??????? ?? ???????.   ???? ?????????? ????? ??????? ???? ? ?????, ???????? ??? ???????????, ?????????? ?? ?????????? ??????????? ???????.    ??????? ???? ?????? ?????? ? ?????. ?? ?????? ???????? ??????? ????? 3 ???. ??????? ??????? ? ????? ? ?????. ?? ??????????? ? ?? ????????, ???? ??????? ????????? ?? ???????.   ???? ?????????????? ??????????? ?????????? MYNX, ??? ???????? ?? ?????? ????????????? ????????? ?????. ?? ????? ? ?? ?????????? ???. ?? ???????? ????????????? ? ????? ???????????? ? ??????? ?????????? ??????.    ??????????? ??????????????????? ? ????? ??????? ??????, ??? ??????????? ?????.                 ??? ?. ?. ?.

## 2019-10-01 NOTE — H&P
Radiology History and Physical    Patient: [facility] Meter 60 y.o. male       Chief Complaint: No chief complaint on file.       History of Present Illness: for Y90    History:    Past Medical History:   Diagnosis Date    Arthritis     Hepatitis B carrier ([facility]     Hepatoma ([facility] 1/6/2017    Hypertension     PT DENIES    Liver disease     HEPATITIS B    Liver mass, right lobe 4/22/2016    Pulmonary nodule 06/2016    right middle lobe, recheck 11/16 stable at 2 mm, recheck again in 6 mo    Ulnar nerve compression     right     Family History   Problem Relation Age of Onset    Anesth Problems Neg Hx      Social History     Socioeconomic History    Marital status:      Spouse name: Not on file    Number of children: Not on file    Years of education: Not on file    Highest education level: Not on file   Occupational History    Not on file   Social Needs    Financial resource strain: Not on file    Food insecurity:     Worry: Not on file     Inability: Not on file    Transportation needs:     Medical: Not on file     Non-medical: Not on file   Tobacco Use    Smoking status: Current Every Day Smoker     Packs/day: 0.50     Years: 20.00     Pack years: 10.00     Types: Cigarettes    Smokeless tobacco: Never Used   Substance and Sexual Activity    Alcohol use: No     Alcohol/week: 0.0 standard drinks     Comment: quit 11/2016    Drug use: No    Sexual activity: Yes     Partners: Female   Lifestyle    Physical activity:     Days per week: Not on file     Minutes per session: Not on file    Stress: Not on file   Relationships    Social connections:     Talks on phone: Not on file     Gets together: Not on file     Attends Synagogue service: Not on file     Active member of club or organization: Not on file     Attends meetings of clubs or organizations: Not on file     Relationship status: Not on file    Intimate partner violence:     Fear of current or ex partner: Not on file     Emotionally abused: Not on file     Physically abused: Not on file     Forced sexual activity: Not on file   Other Topics Concern    Not on file   Social History Narrative    Not on file       Allergies: Allergies   Allergen Reactions    Venom-Honey Bee Anaphylaxis       Current Medications:  Current Outpatient Medications   Medication Sig    tenofovir DISOPROXIL FUMARATE (VIREAD) 300 mg tablet TAKE 1 TABLET BY MOUTH ONCE DAILY     Current Facility-Administered Medications   Medication Dose Route Frequency    iopamidol (ISOVUE 300) 61 % contrast injection 150 mL  150 mL IntraCATHeter RAD ONCE    fentaNYL citrate (PF) injection 25 mcg  25 mcg IntraVENous RAD PRN    midazolam (VERSED) injection 5 mg  5 mg IntraVENous RAD PRN    iopamidol (ISOVUE 300) 61 % contrast injection            Physical Exam:  Blood pressure 180/87, pulse (!) 57, temperature 98.4 °F (36.9 °C), resp. rate 15, height 5' 7\" (1.702 m), weight 67.6 kg (149 lb), SpO2 98 %. LUNG: clear to auscultation bilaterally, HEART: regular rate and rhythm      Alerts:    Hospital Problems  Date Reviewed: 9/3/2019    None          Laboratory:    No results for input(s): HGB, HCT, WBC, PLT, INR, BUN, CREA, K, CRCLT, HGBEXT, HCTEXT, PLTEXT, INREXT in the last 72 hours. No lab exists for component: PTT, PT      Plan of Care/Planned Procedure:  Risks, benefits, and alternatives reviewed with patient and he agrees to proceed with the procedure.        Nik Pena MD

## 2019-10-01 NOTE — H&P (VIEW-ONLY)
Radiology History and Physical 
 
Patient: Jose Miller 60 y.o. male Chief Complaint: No chief complaint on file. History of Present Illness: for Y90 History: 
 
Past Medical History:  
Diagnosis Date  Arthritis  Hepatitis B carrier ([facility]  Hepatoma ([facility] 1/6/2017  Hypertension PT DENIES  Liver disease HEPATITIS B  
 Liver mass, right lobe 4/22/2016  Pulmonary nodule 06/2016  
 right middle lobe, recheck 11/16 stable at 2 mm, recheck again in 6 mo  Ulnar nerve compression   
 right Family History Problem Relation Age of Onset  Anesth Problems Neg Hx Social History Socioeconomic History  Marital status:  Spouse name: Not on file  Number of children: Not on file  Years of education: Not on file  Highest education level: Not on file Occupational History  Not on file Social Needs  Financial resource strain: Not on file  Food insecurity:  
  Worry: Not on file Inability: Not on file  Transportation needs:  
  Medical: Not on file Non-medical: Not on file Tobacco Use  Smoking status: Current Every Day Smoker Packs/day: 0.50 Years: 20.00 Pack years: 10.00 Types: Cigarettes  Smokeless tobacco: Never Used Substance and Sexual Activity  Alcohol use: No  
  Alcohol/week: 0.0 standard drinks Comment: quit 11/2016  Drug use: No  
 Sexual activity: Yes  
  Partners: Female Lifestyle  Physical activity:  
  Days per week: Not on file Minutes per session: Not on file  Stress: Not on file Relationships  Social connections:  
  Talks on phone: Not on file Gets together: Not on file Attends Sabianism service: Not on file Active member of club or organization: Not on file Attends meetings of clubs or organizations: Not on file Relationship status: Not on file  Intimate partner violence:  
  Fear of current or ex partner: Not on file Emotionally abused: Not on file Physically abused: Not on file Forced sexual activity: Not on file Other Topics Concern  Not on file Social History Narrative  Not on file Allergies: Allergies Allergen Reactions  Venom-Honey Bee Anaphylaxis Current Medications: 
Current Outpatient Medications Medication Sig  
 tenofovir DISOPROXIL FUMARATE (VIREAD) 300 mg tablet TAKE 1 TABLET BY MOUTH ONCE DAILY Current Facility-Administered Medications Medication Dose Route Frequency  iopamidol (ISOVUE 300) 61 % contrast injection 150 mL  150 mL IntraCATHeter RAD ONCE  
 fentaNYL citrate (PF) injection 25 mcg  25 mcg IntraVENous RAD PRN  
 midazolam (VERSED) injection 5 mg  5 mg IntraVENous RAD PRN  
 iopamidol (ISOVUE 300) 61 % contrast injection Physical Exam: 
Blood pressure 180/87, pulse (!) 57, temperature 98.4 °F (36.9 °C), resp. rate 15, height 5' 7\" (1.702 m), weight 67.6 kg (149 lb), SpO2 98 %. LUNG: clear to auscultation bilaterally, HEART: regular rate and rhythm Alerts:   
Hospital Problems  Date Reviewed: 9/3/2019 None Laboratory:    No results for input(s): HGB, HCT, WBC, PLT, INR, BUN, CREA, K, CRCLT, HGBEXT, HCTEXT, PLTEXT, INREXT in the last 72 hours. No lab exists for component: PTT, PT 
 
 
Plan of Care/Planned Procedure: 
Risks, benefits, and alternatives reviewed with patient and he agrees to proceed with the procedure.   
 
 
Toño Burnham MD

## 2019-10-11 ENCOUNTER — HOSPITAL ENCOUNTER (OUTPATIENT)
Dept: RADIATION THERAPY | Age: 59
Discharge: HOME | End: 2019-10-11
Payer: COMMERCIAL

## 2019-10-11 DIAGNOSIS — C22.0 HEPATOCELLULAR CARCINOMA (HCC): Primary | ICD-10-CM

## 2019-10-11 PROCEDURE — 77470 SPECIAL RADIATION TREATMENT: CPT

## 2019-10-14 ENCOUNTER — HOSPITAL ENCOUNTER (OUTPATIENT)
Dept: RADIATION THERAPY | Age: 59
Discharge: HOME | End: 2019-10-14
Attending: PHYSICIAN ASSISTANT | Admitting: RADIOLOGY
Payer: COMMERCIAL

## 2019-10-14 ENCOUNTER — HOSPITAL ENCOUNTER (OUTPATIENT)
Dept: INTERVENTIONAL RADIOLOGY/VASCULAR | Age: 59
Discharge: HOME | End: 2019-10-14
Attending: PHYSICIAN ASSISTANT | Admitting: RADIOLOGY
Payer: COMMERCIAL

## 2019-10-14 VITALS
BODY MASS INDEX: 23.39 KG/M2 | WEIGHT: 149 LBS | HEART RATE: 64 BPM | TEMPERATURE: 97.5 F | RESPIRATION RATE: 16 BRPM | SYSTOLIC BLOOD PRESSURE: 118 MMHG | DIASTOLIC BLOOD PRESSURE: 57 MMHG | HEIGHT: 67 IN | OXYGEN SATURATION: 96 %

## 2019-10-14 DIAGNOSIS — C22.0 HEPATOCELLULAR CARCINOMA (HCC): ICD-10-CM

## 2019-10-14 PROCEDURE — C2616 BRACHYTX, NON-STR,YTTRIUM-90: HCPCS

## 2019-10-14 PROCEDURE — 74011636320 HC RX REV CODE- 636/320: Performed by: RADIOLOGY

## 2019-10-14 PROCEDURE — C1760 CLOSURE DEV, VASC: HCPCS

## 2019-10-14 PROCEDURE — C1894 INTRO/SHEATH, NON-LASER: HCPCS

## 2019-10-14 PROCEDURE — 74011000250 HC RX REV CODE- 250

## 2019-10-14 PROCEDURE — 77300 RADIATION THERAPY DOSE PLAN: CPT

## 2019-10-14 PROCEDURE — 77790 RADIATION HANDLING: CPT

## 2019-10-14 PROCEDURE — 77030019698 HC SYR ANGI MDLON MRTM -A

## 2019-10-14 PROCEDURE — 74011250636 HC RX REV CODE- 250/636: Performed by: RADIOLOGY

## 2019-10-14 PROCEDURE — 77030016711

## 2019-10-14 PROCEDURE — 99152 MOD SED SAME PHYS/QHP 5/>YRS: CPT

## 2019-10-14 PROCEDURE — C1769 GUIDE WIRE: HCPCS

## 2019-10-14 PROCEDURE — C1887 CATHETER, GUIDING: HCPCS

## 2019-10-14 PROCEDURE — 77030014021 HC SYR ANGI FIX LOK MRTM -A

## 2019-10-14 PROCEDURE — 74011000250 HC RX REV CODE- 250: Performed by: RADIOLOGY

## 2019-10-14 RX ORDER — LIDOCAINE HYDROCHLORIDE 10 MG/ML
10 INJECTION, SOLUTION EPIDURAL; INFILTRATION; INTRACAUDAL; PERINEURAL
Status: COMPLETED | OUTPATIENT
Start: 2019-10-14 | End: 2019-10-14

## 2019-10-14 RX ORDER — MIDAZOLAM HYDROCHLORIDE 1 MG/ML
5 INJECTION, SOLUTION INTRAMUSCULAR; INTRAVENOUS
Status: DISCONTINUED | OUTPATIENT
Start: 2019-10-14 | End: 2019-10-14 | Stop reason: HOSPADM

## 2019-10-14 RX ORDER — FENTANYL CITRATE 50 UG/ML
200 INJECTION, SOLUTION INTRAMUSCULAR; INTRAVENOUS
Status: DISCONTINUED | OUTPATIENT
Start: 2019-10-14 | End: 2019-10-14 | Stop reason: HOSPADM

## 2019-10-14 RX ORDER — VERAPAMIL HYDROCHLORIDE 2.5 MG/ML
INJECTION, SOLUTION INTRAVENOUS
Status: COMPLETED
Start: 2019-10-14 | End: 2019-10-14

## 2019-10-14 RX ADMIN — LIDOCAINE HYDROCHLORIDE 10 ML: 10 INJECTION, SOLUTION EPIDURAL; INFILTRATION; INTRACAUDAL; PERINEURAL at 09:23

## 2019-10-14 RX ADMIN — FENTANYL CITRATE 50 MCG: 50 INJECTION INTRAMUSCULAR; INTRAVENOUS at 09:31

## 2019-10-14 RX ADMIN — IOPAMIDOL 42 ML: 612 INJECTION, SOLUTION INTRAVENOUS at 09:34

## 2019-10-14 RX ADMIN — VERAPAMIL HYDROCHLORIDE 10 MG: 2.5 INJECTION, SOLUTION INTRAVENOUS at 09:26

## 2019-10-14 RX ADMIN — FENTANYL CITRATE 50 MCG: 50 INJECTION INTRAMUSCULAR; INTRAVENOUS at 09:16

## 2019-10-14 RX ADMIN — MIDAZOLAM 2 MG: 1 INJECTION INTRAMUSCULAR; INTRAVENOUS at 09:16

## 2019-10-14 NOTE — INTERVAL H&P NOTE
H&P Update:  Sonal Kaur was seen and examined. History and physical has been reviewed. The patient has been examined.  There have been no significant clinical changes since the completion of the originally dated History and Physical.

## 2019-10-14 NOTE — PROGRESS NOTES
Pt discharged via wheelchair escorted by sons, discharge instructions given and all verbalize understanding

## 2019-10-14 NOTE — DISCHARGE INSTRUCTIONS
[address]  Department of Interventional Radiology       Chemoembolization Discharge Instructions    General Information:  Chemoembolization is a procedure used to treat a tumor in your liver. Using angiography (a medical imaging technique) your doctor has inserted beads that have been soaked in a chemotherapy agent directly into the tumor. The goal of this therapy is to cut off the blood supply to the tumor and deliver the cancer killing drugs directly into the tumor thus slowing growth. Home Care Instructions: You can resume your regular diet. Do not tub bathe, swim, drink alcohol, or make any important legal decisions for the next 48 hours. Do not lift anything heavier than a gallon of milk for 5 days. If you take Glucophage (metformin) for diabetes, do not take it for the next 48 hours. Over the next week you may experience abdominal pain, nausea, vomiting, and a low grade fever. You were given a prescription to deal with these symptoms. Follow-Up Instructions:  Please see your ordering doctor as he/she has requested. Call If:   You should call your Physician and/or the Radiology Nurse if you have any signs of infection like fever, drainage, redness, and/or swelling. If the puncture site should ooze, please call. Also call if you have any pain, decreased sensation, numbness, tingling, swelling, or change in color to the affected extremity. SEEK IMMEDIATE MEDICAL CARE IF YOUR PUNCTURE SITE STARTS TO BLEED. APPLY ENOUGH FIRM PRESSURE TO THE SITE WITH THE TIPS OF YOUR FINGERS TO STOP THE BLEEDING. Arterial bleeding is a medical emergency and should be evaluated immediately. Call your doctor immediately if you develop a fever greater than 101.5°F, shaking, chills, or dizziness.  Also notify your doctor if you develop severe right upper abdominal pain or nausea/vomiting and the symptoms are not relieved by medication. To Reach Us: If you have any questions about your procedure, please call the Interventional Radiology department at [phone] between the hours of 0730 am and 10 pm.  After hours [phone], ask the  to page the x-ray technologist and describe the problem to the technologist.    Home Care Instructions: You can resume your regular diet and medication regimen. Do not drink alcohol, drive, or make any important legal decisions in the next 24 hours. Do not lift anything heavier than a gallon of milk, or do anything strenuous for the next 24 hours. You should not shower for 24 hours. Call If:     You should call your Physician and/or the Radiology Nurse if you have any bleeding other than a small spot on your bandage. Call if you have any signs of infection, fever, or increased pain at the site . Follow-Up Instructions: Please see your ordering doctor as he/she has requested. To Reach Us: Should you experience any significant changes, please call [phone] between the hours of 7:30 am and 10 pm or [phone] after hours.  After hours, ask the  to page the [address] Technologist, and describe the problem to the technologist.

## 2019-10-30 ENCOUNTER — OFFICE VISIT (OUTPATIENT)
Dept: HEMATOLOGY | Age: 59
End: 2019-10-30

## 2019-10-30 VITALS
TEMPERATURE: 97.8 F | HEIGHT: 67 IN | DIASTOLIC BLOOD PRESSURE: 80 MMHG | SYSTOLIC BLOOD PRESSURE: 164 MMHG | OXYGEN SATURATION: 97 % | RESPIRATION RATE: 18 BRPM | WEIGHT: 146.4 LBS | HEART RATE: 56 BPM | BODY MASS INDEX: 22.98 KG/M2

## 2019-10-30 DIAGNOSIS — C22.0 HEPATOCELLULAR CARCINOMA (HCC): Primary | ICD-10-CM

## 2019-10-30 NOTE — PROGRESS NOTES
Ashley Davila is a 60 y.o. male  HIPAA verified by two patient identifiers. Chief Complaint   Patient presents with    Other     follow up [facility]. Treatment     Visit Vitals  BP (!) 173/93 (BP 1 Location: Left arm, BP Patient Position: Sitting)   Pulse (!) 56   Temp 97.8 °F (36.6 °C) (Tympanic)   Resp 18   Ht 5' 7\" (1.702 m)   Wt 146 lb 6.4 oz (66.4 kg)   SpO2 97%   BMI 22.93 kg/m²       Pain Scale: 0 - No pain/10  Pain Location:   1. Have you been to the ER, urgent care clinic since your last visit? Hospitalized since your last visit? No    2. Have you seen or consulted any other health care providers outside of the [address] [place] [address] since your last visit? Include any pap smears or colon screening.  No

## 2019-10-31 LAB
AFP L3 MFR SERPL: NORMAL % (ref 0–9.9)
AFP SERPL-MCNC: 4 NG/ML (ref 0–8)
ALBUMIN SERPL-MCNC: 4.3 G/DL (ref 3.6–4.8)
ALP SERPL-CCNC: 84 IU/L (ref 39–117)
ALT SERPL-CCNC: 24 IU/L (ref 0–44)
AST SERPL-CCNC: 37 IU/L (ref 0–40)
BILIRUB DIRECT SERPL-MCNC: 0.13 MG/DL (ref 0–0.4)
BILIRUB SERPL-MCNC: 0.3 MG/DL (ref 0–1.2)
BUN SERPL-MCNC: 8 MG/DL (ref 8–27)
BUN/CREAT SERPL: 9 (ref 10–24)
CALCIUM SERPL-MCNC: 9.2 MG/DL (ref 8.6–10.2)
CHLORIDE SERPL-SCNC: 94 MMOL/L (ref 96–106)
CO2 SERPL-SCNC: 25 MMOL/L (ref 20–29)
CREAT SERPL-MCNC: 0.88 MG/DL (ref 0.76–1.27)
ERYTHROCYTE [DISTWIDTH] IN BLOOD BY AUTOMATED COUNT: 12.2 % (ref 12.3–15.4)
GLUCOSE SERPL-MCNC: 100 MG/DL (ref 65–99)
HCT VFR BLD AUTO: 40.6 % (ref 37.5–51)
HGB BLD-MCNC: 14.3 G/DL (ref 13–17.7)
INR PPP: 1 (ref 0.8–1.2)
MCH RBC QN AUTO: 33.6 PG (ref 26.6–33)
MCHC RBC AUTO-ENTMCNC: 35.2 G/DL (ref 31.5–35.7)
MCV RBC AUTO: 96 FL (ref 79–97)
PLATELET # BLD AUTO: 243 X10E3/UL (ref 150–450)
POTASSIUM SERPL-SCNC: 4.2 MMOL/L (ref 3.5–5.2)
PROTHROMBIN TIME: 10.8 SEC (ref 9.1–12)
RBC # BLD AUTO: 4.25 X10E6/UL (ref 4.14–5.8)
SODIUM SERPL-SCNC: 133 MMOL/L (ref 134–144)
WBC # BLD AUTO: 6.2 X10E3/UL (ref 3.4–10.8)

## 2020-01-18 ENCOUNTER — HOSPITAL ENCOUNTER (OUTPATIENT)
Dept: CT IMAGING | Age: 60
Discharge: HOME | End: 2020-01-18
Attending: PHYSICIAN ASSISTANT
Payer: COMMERCIAL

## 2020-01-18 DIAGNOSIS — C22.0 HEPATOCELLULAR CARCINOMA (HCC): ICD-10-CM

## 2020-01-18 PROCEDURE — 74170 CT ABD WO CNTRST FLWD CNTRST: CPT

## 2020-01-18 PROCEDURE — 74011636320 HC RX REV CODE- 636/320: Performed by: PHYSICIAN ASSISTANT

## 2020-01-18 RX ORDER — SODIUM CHLORIDE 0.9 % (FLUSH) 0.9 %
10 SYRINGE (ML) INJECTION
Status: COMPLETED | OUTPATIENT
Start: 2020-01-18 | End: 2020-01-18

## 2020-01-18 RX ADMIN — Medication 10 ML: at 09:04

## 2020-01-18 RX ADMIN — IOPAMIDOL 100 ML: 755 INJECTION, SOLUTION INTRAVENOUS at 09:04

## 2020-01-30 ENCOUNTER — OFFICE VISIT (OUTPATIENT)
Dept: HEMATOLOGY | Age: 60
End: 2020-01-30

## 2020-01-30 VITALS
TEMPERATURE: 96.9 F | HEART RATE: 58 BPM | SYSTOLIC BLOOD PRESSURE: 178 MMHG | DIASTOLIC BLOOD PRESSURE: 89 MMHG | WEIGHT: 150 LBS | OXYGEN SATURATION: 98 % | BODY MASS INDEX: 23.54 KG/M2 | HEIGHT: 67 IN

## 2020-01-30 DIAGNOSIS — R91.8 LUNG NODULES: Primary | ICD-10-CM

## 2020-01-30 DIAGNOSIS — C22.0 HEPATOCELLULAR CARCINOMA (HCC): ICD-10-CM

## 2020-01-30 NOTE — PROGRESS NOTES
[address] MD Friedman Alois Persons, Lona Homestead, MD Carlene Man, PA-C Elner Perks, PRINCESS-NIMO ChampionMAICOL-BC   NATACHA Watt [facility]       Noel Malhotra [address]    at [address], [address] [place] [address], [facility].    [phone]    FAX: [address] [place] [address], [address]    [phone]    FAX: [phone]     Patient Care Team:  Jody Grace MD as PCP - General (Family Practice)  Jody Grace MD as PCP - [facility]  Tracie Ho MD (Cardiology)  Bautista Celis MD (General Surgery)  Taina Kerr, RN as Nurse Navigator      Patient Active Problem List   Diagnosis Code    Chronic hepatitis B ([address] B18.1    Liver mass, right lobe R16.0    Pulmonary nodule R91.1    Hepatocellular carcinoma ([address] C22.0    Liver hematoma, infected D12.980D    H/O resection of liver Z90.49       Phong Courser returns to the [address] for management of [address] and chronic HBV. The active problem list, all pertinent past medical history, medications, radiologic findings and laboratory findings related to the liver disorder were reviewed with the patient. The patient is a 63 y.o.  male from [place] who was first noted to have abnormalities in liver transaminases in 3/2016. Serologic studies demonstrated he was positive for HBV and ASMA. Assessment of liver fibrosis with Fibroscan and surgical resection suggested stage 3 bridging fibrosis. He was started on Viread in 6/2016.   The last HBV DNA was undetectable and he has continued to tolerate this medication well and has had no issues with ongoing access to this medication. He developed [address] in segment 8, right lobe in 11/2016. This was treated with surgical resection in 1/2017. He has undergone regular surveillance for [address] following surgical resection. A CT scan in 2/2018 demonstrated a new 1.1 cm lesion in segment 7, right lobe not adjacent to the previous resection site. We had presented him with treatment options for recurrent HCC, he elected at that time to proceed with RFA. He was not open to transplant at that time, but relates that he might consider this as an option in the future if needed. He has been trying to cut back on cigarette use, but is still smoking heavily and drinking a few beers \"every once in a while. \"    Patient underwent uneventful RFA of the segment 7 lesion on 3/30/2018. He remained without recurrent lesion until imaging with CT in 9/2019 showed a new 3 cm left hepatic hypervascular mass, concerning for hepatoma. He has had Y-90 TARE done on 10/14/2019 and tolerate this procedure well, c/o some minor pain 2 days following the therapy. He is doing well now without abdominal pain, nausea or change in activity levels. This is his first presentation post ablation for repeat imaging. Recent CT scan in 1/2020 is clear of recurrent or new lesions. The patient notes persistent pain in the right side over the liver since the liver resection, this has not gotten any worse. The patient has not experienced fatigue. The patient completes all daily activities without any functional limitations. He otherwise has no new complaints. His son has noted that he has had increased frequency of coughing episodes. ALLERGIES  Allergies   Allergen Reactions    Venom-Honey Bee Anaphylaxis       MEDICATIONS  Current Outpatient Medications   Medication Sig    lisinopril (PRINIVIL, ZESTRIL) 10 mg tablet Take 1 Tab by mouth daily.  tenofovir DISOPROXIL FUMARATE (VIREAD) 300 mg tablet Take 300 mg by mouth daily.      No current facility-administered medications for this visit. SYSTEM REVIEW NOT RELATED TO LIVER DISEASE OR REVIEWED ABOVE:  Constitution systems: Negative for fever, chills, weight gain, weight loss. Eyes: Negative for visual changes. ENT: Negative for sore throat, painful swallowing. Respiratory: Negative for cough, hemoptysis, SOB. Cardiology: Negative for chest pain, palpitations. GI:  Negative for constipation or diarrhea. Some achy/pulling pain inferior to incision, persists - but to a much less extent. : Negative for urinary frequency, dysuria, hematuria, nocturia. Skin: Negative for rash. Diminished sensitivity of the skin overlying the right anterior thigh since surgery. Hematology: Negative for easy bruising, blood clots. Musculo-skeletal: Negative for back pain, muscle pain, weakness. Neurologic: Negative for headaches, dizziness, vertigo, memory problems not related to HE. Psychology: Negative for anxiety, depression. FAMILY HISTORY:  The father  of lung cancer. The patient has no knowledge of the mother's medical condition. There is no family history of liver disease. SOCIAL HISTORY:  The patient is . The patient has 5 children, and 3 grandchildren. The patient currently smokes ~1/2 pack of tobacco daily. He is trying to cut back on cigarettes in general.  The patient has previously consumed alcohol in excess. He now consumes alcoholic beverages on weekends, 1-2 times a month. This represents a significant reduction for him. The patient currently works full time as a . PHYSICAL EXAMINATION:  Visit Vitals  /89 (BP 1 Location: Left arm, BP Patient Position: Sitting)   Pulse (!) 58   Temp 96.9 °F (36.1 °C) (Tympanic)   Ht 5' 7\" (1.702 m)   Wt 150 lb (68 kg)   SpO2 98%   BMI 23.49 kg/m²     General: No acute distress. Eyes: Sclera anicteric. ENT: No oral lesions. Thyroid normal.  Nodes: No adenopathy.    Skin: No spider angiomata. No jaundice. No palmar erythema. Respiratory: Lungs clear to auscultation. Cardiovascular: Regular heart rate. No murmurs. No JVD. Abdomen: 9-10 cm RUQ incisional scar, well-approximated without keloid or hernia. No surrounding erythema. Tender with palpation along the inferior border of the liver, which is prominent. Small, reducible umbilical hernia. Spleen not palpable. No obvious ascites. Extremities: No edema. No muscle wasting. No gross arthritic changes. Positive for significant varicose veins on the LLE. Neurologic: Alert and oriented. Cranial nerves grossly intact. No asterixis. LABORATORY STUDIES:  Liver [place] [address] Units 10/30/2019 9/3/2019 3/1/2019   WBC 3.4 - 10.8 x10E3/uL 6.2 10.1 5.8   ANC 1.8 - 8.0 K/UL      HGB 13.0 - 17.7 g/dL 14.3 15.3 15.5    - 450 x10E3/uL 243 244 229   INR 0.8 - 1.2 1.0 1.0 1.0   AST 0 - 40 IU/L 37 62 (H) 27   ALT 0 - 44 IU/L 24 32 16   Alk Phos 39 - 117 IU/L 84 74 71   Bili, Total 0.0 - 1.2 mg/dL 0.3 0.6 0.3   Bili, Direct 0.00 - 0.40 mg/dL 0.13 0.20 0.13   Albumin 3.6 - 4.8 g/dL 4.3 5.0 (H) 4.7   BUN 8 - 27 mg/dL 8 8 9   Creat 0.76 - 1.27 mg/dL 0.88 0.89 1.16   Na 134 - 144 mmol/L 133 (L) 133 (L) 142   K 3.5 - 5.2 mmol/L 4.2 4.1 5.2   Cl 96 - 106 mmol/L 94 (L) 90 (L) 103   CO2 20 - 29 mmol/L 25 25 25   Glucose 65 - 99 mg/dL 100 (H) 103 (H) 95   Magnesium 1.6 - 2.4 mg/dL        Virology Latest Ref Rng & Units 9/3/2019 3/1/2019   HBV DNA IU/mL HBV DNA not detected HBV DNA not detected     Virology Latest Ref Rng & Units 11/1/2018   HBV DNA IU/mL HBV DNA not detected     Cancer Screening Latest Ref Rng & Units 10/30/2019 9/3/2019 3/1/2019   AFP, Serum 0.0 - 8.0 ng/mL 4.0 1.8 1.8   AFP-L3% 0.0 - 9.9 % Comment Comment Comment   Additional lab values drawn at today's office visit are pending at the time of documentation.     SEROLOGIES:  Serologies Latest Ref Rng 6/10/2016 4/22/2016   Hep A Ab, Total Negative Positive (A)   Hep B Surface Ag Negative  Positive (A)   Hep B Core Ab, Total Negative  Positive (A)   Hep B Surface AB QL   Non Reactive   Hep C Ab 0.0 - 0.9 s/co ratio  0.1   Hep D AB Not Detected Not Detected    Ferritin 30 - 400 ng/mL  359   Iron % Saturation 15 - 55 %  76 (HH)   AKIN, IFA   Negative   ASMCA 0 - 19 Units  83 (H)   Alpha-1 antitrypsin level 90 - 200 mg/dL  176   6/2016. HIV Ab negative. HBeAg negative, HBeAb positive. LIVER HISTOLOGY:  6/2016. FibroScan performed at [address]. EkPa was 11.8. IQR/med 26%. The results suggested a fibrosis level of F3.  1/2017. Liver resection. 2.2 cm HCC, moderately differentiated with free margins. Adjacent liver shows chronic hepatitis with bridging fibrosis (grade 2, stage 3)    ENDOSCOPIC PROCEDURES:  Not available or performed    RADIOLOGY:  3/2016. Ultrasound of liver. Normal appearing liver. Single hyperecholic mass in the right lobe measuring 1.5 cm. Consistent with hemangioma. 5/2016. CT scan abdomen with and without IV contrast. 1.9 cm hemangioma in the right hepatic lobe. No additional liver lesion. 11/2016. Ultrasound of liver. Increase in size of the right lobe hepatic mass. CT scan or MRI recommended. 11/2016. CT scan abdomen. Interval enlargement of hypervascular segment 8 liverlesion now 2.5 x 1.8 x 3.1 cm. In the setting of hepatitis B, despite lack of CT evidence of cirrhosis, hepatocellular carcinoma is more likely than an unusual presentation of a benign lesion such as atypical hemangioma. 3/2017. CT of abdomen. No residual enhancing tumor  Gas within the right lobe resection site is unexpected 2 months postoperatively. Surgical packing material, abscess, and less likely retained postoperative gas, are possible. 4/2017. CT abdomen. No CT evidence of locally recurrent hepatocellular carcinoma, metastatic disease, or new lesions within cirrhotic appearing liver. Resolution of fluid collection. 2/2018. CT scan abdomen with and without IV contrast.  Changes consistent with chronic liver disease. New 1.1 cm enhancing liver mass with washout and rim enhancement in segment 7, right lobe. No dilated bile ducts. No ascites. 2018. CT abdomen. Wide ablation zone around the segment 6-7 junction HCC. No new hepatic masses. Early arterial phase limits sensitivity for small hypervascular masses. 2019. CT abdomen and chest.  No sign of pulmonary nodule concern. Low-attenuation ablation defect in the periphery of the right lobe of liver in segment 7 slightly increased to equivocal in size. There is no evidence of recurrent or new disease within the liver. 2019. CT abdomen and chest. New left hepatic hypervascular mass, 2.5 x 2.9 cm. concerning for hepatoma. Stable right middle lobe pulmonary micronodules, stable as compared to the 2016 study. 2020. CT abdomen. No new or recurrent liver lesions. Tiny renal hypodensities, likely cysts. OTHER TESTIN2017. Surgical resection of [address] segment 8.  3/2018. RFA treatment to segment 7 lesion. 10/2019. Y-90 TARE. ASSESSMENT AND PLAN:  Chronic HBV with bridging fibrosis. Liver function is normal.  The platelet count is normal.    Chronic E-antigen negative HBV with undetectable HBV DNA on tenofovir. He continues to tolerate this well. He understands that this will be a life-long medication pending loss of Hep B Surface Ag. [address] treated with surgical resection in 2017. Recurrence of [address] in 2018. The patient had elected to treat with RFA in 3/2018. Recurrence of [address] in 2019. The patient has had TARE Y-90 theraspheres in 10/2019. No new or recurrent lesions on this months' imaging. I have outlined with the patient and his son the plan to have repeat CT scans every 3 months. This will be scheduled prior to his next office visit. Will obtain labs today to verify stable liver function and to assess AFP and HBV suppression.      I have reviewed with the patient options for treatment/transplant if recurrence were to occur. Patient would entertain transplant in the future. He is making an attempt to quit cigarettes and has largely eliminated alcohol. We could also consider use of oral anti-tumor agents as needed - he has elected to defer. Pulmonary nodules on chest CT have not changed since 11/2016 and none described on 9/2019 CT of the chest.  Will continue to monitor. I have again stressed the importance of smoking cessation. Hypertension. Patient has seen PCP for management and was placed on low dose lisinopril. He is tolerating well but continues to have elevation in BP values. He has follow-up with PCP in the next few weeks for adjustment. The patient was directed to continue all current medications at the current dosages. There are no contraindications for the patient to take any medications that are necessary for treatment of other medical issues. The patient was advised to remain abstinent from alcohol. He is continuing to drink alcohol on rare occasions. Vaccination for viral hepatitis A is not needed. The patient has serologic evidence of prior exposure or vaccination with immunity. All of the above issues were discussed with the patient. All questions were answered. The patient expressed a clear understanding of the above. [facility] 87 in 3 months with CT of the abdomen/chest prior to the next appointment.      Kalyn Cyr PA-C  Liver [facility], [facility] 22.  [phone]  [address] [place] [address]

## 2020-01-30 NOTE — PROGRESS NOTES
Richard Sullivan is a 63 y.o. male  Chief Complaint   Patient presents with    Follow-up     3 mo f.u     Visit Vitals  /89 (BP 1 Location: Left arm, BP Patient Position: Sitting)   Pulse (!) 58   Temp 96.9 °F (36.1 °C) (Tympanic)   Ht 5' 7\" (1.702 m)   Wt 150 lb (68 kg)   SpO2 98%   BMI 23.49 kg/m²     bp 178/89 , HR 58 patient has no current sx has appt Monday to f.u on bp  Provider notified via exam room white board. 3 most recent PHQ Screens 1/30/2020   Little interest or pleasure in doing things Not at all   Feeling down, depressed, irritable, or hopeless Not at all   Total Score PHQ 2 0     Learning Assessment 9/3/2019   PRIMARY LEARNER Patient   HIGHEST LEVEL OF EDUCATION - PRIMARY LEARNER  -   [facility] PRIMARY LEARNER [address] CAREGIVER Yes   CO-LEARNER NAME Delilah Olivarez HIGHEST LEVEL OF EDUCATION -   [address] [facility] [address] (COMMENT)   PRIMARY LANGUAGE CO-LEARNER -    NEED -   LEARNER PREFERENCE PRIMARY LISTENING   LEARNER PREFERENCE CO-LEARNER -   LEARNING SPECIAL TOPICS -   ANSWERED BY patient   RELATIONSHIP SELF   ASSESSMENT COMMENT -     Abuse Screening Questionnaire 1/6/2020   Do you ever feel afraid of your partner? N   Are you in a relationship with someone who physically or mentally threatens you? N   Is it safe for you to go home? Y         1. Have you been to the ER, urgent care clinic since your last visit? Hospitalized since your last visit? No    2. Have you seen or consulted any other health care providers outside of the [address] [place] [address] since your last visit? Include any pap smears or colon screening.  No

## 2020-01-31 LAB
AFP L3 MFR SERPL: NORMAL % (ref 0–9.9)
AFP SERPL-MCNC: 5.9 NG/ML (ref 0–8)
ALBUMIN SERPL-MCNC: 4.5 G/DL (ref 3.8–4.8)
ALP SERPL-CCNC: 102 IU/L (ref 39–117)
ALT SERPL-CCNC: 19 IU/L (ref 0–44)
AST SERPL-CCNC: 30 IU/L (ref 0–40)
BILIRUB DIRECT SERPL-MCNC: 0.05 MG/DL (ref 0–0.4)
BILIRUB SERPL-MCNC: <0.2 MG/DL (ref 0–1.2)
BUN SERPL-MCNC: 15 MG/DL (ref 8–27)
BUN/CREAT SERPL: 13 (ref 10–24)
CALCIUM SERPL-MCNC: 9.1 MG/DL (ref 8.6–10.2)
CHLORIDE SERPL-SCNC: 97 MMOL/L (ref 96–106)
CO2 SERPL-SCNC: 21 MMOL/L (ref 20–29)
CREAT SERPL-MCNC: 1.13 MG/DL (ref 0.76–1.27)
ERYTHROCYTE [DISTWIDTH] IN BLOOD BY AUTOMATED COUNT: 13.1 % (ref 11.6–15.4)
GLUCOSE SERPL-MCNC: 94 MG/DL (ref 65–99)
HBV DNA SERPL NAA+PROBE-ACNC: NORMAL IU/ML
HBV DNA SERPL NAA+PROBE-LOG IU: NORMAL LOG10 IU/ML
HCT VFR BLD AUTO: 45.1 % (ref 37.5–51)
HGB BLD-MCNC: 15.5 G/DL (ref 13–17.7)
INR PPP: NORMAL
MCH RBC QN AUTO: 33.8 PG (ref 26.6–33)
MCHC RBC AUTO-ENTMCNC: 34.4 G/DL (ref 31.5–35.7)
MCV RBC AUTO: 98 FL (ref 79–97)
PLATELET # BLD AUTO: 230 X10E3/UL (ref 150–450)
POTASSIUM SERPL-SCNC: 4.4 MMOL/L (ref 3.5–5.2)
PROTHROMBIN TIME: NORMAL S
RBC # BLD AUTO: 4.59 X10E6/UL (ref 4.14–5.8)
REF LAB TEST REF RANGE: NORMAL
SODIUM SERPL-SCNC: 135 MMOL/L (ref 134–144)
WBC # BLD AUTO: 6.8 X10E3/UL (ref 3.4–10.8)

## 2020-02-04 NOTE — PROGRESS NOTES
Patient notified of stable lab findings, follow-up as scheduled in 3 months for repeat imaging and labs.

## 2020-05-09 ENCOUNTER — HOSPITAL ENCOUNTER (OUTPATIENT)
Dept: CT IMAGING | Age: 60
End: 2020-05-09
Attending: PHYSICIAN ASSISTANT
Payer: COMMERCIAL

## 2020-05-09 ENCOUNTER — HOSPITAL ENCOUNTER (OUTPATIENT)
Dept: CT IMAGING | Age: 60
Discharge: HOME | End: 2020-05-09
Attending: PHYSICIAN ASSISTANT
Payer: COMMERCIAL

## 2020-05-09 DIAGNOSIS — C22.0 HEPATOCELLULAR CARCINOMA (HCC): ICD-10-CM

## 2020-05-09 DIAGNOSIS — R91.8 LUNG NODULES: ICD-10-CM

## 2020-05-09 PROCEDURE — 74011636320 HC RX REV CODE- 636/320: Performed by: PHYSICIAN ASSISTANT

## 2020-05-09 PROCEDURE — 71260 CT THORAX DX C+: CPT

## 2020-05-09 PROCEDURE — 74170 CT ABD WO CNTRST FLWD CNTRST: CPT

## 2020-05-09 RX ORDER — SODIUM CHLORIDE 0.9 % (FLUSH) 0.9 %
10 SYRINGE (ML) INJECTION
Status: COMPLETED | OUTPATIENT
Start: 2020-05-09 | End: 2020-05-09

## 2020-05-09 RX ADMIN — Medication 10 ML: at 10:47

## 2020-05-09 RX ADMIN — IOPAMIDOL 100 ML: 755 INJECTION, SOLUTION INTRAVENOUS at 10:47

## 2020-05-12 ENCOUNTER — VIRTUAL VISIT (OUTPATIENT)
Dept: HEMATOLOGY | Age: 60
End: 2020-05-12

## 2020-05-12 DIAGNOSIS — C22.0 HEPATOCELLULAR CARCINOMA (HCC): Primary | ICD-10-CM

## 2020-05-12 RX ORDER — TENOFOVIR DISOPROXIL FUMARATE 300 MG/1
300 TABLET, FILM COATED ORAL DAILY
Qty: 30 TAB | Refills: 11 | Status: SHIPPED | OUTPATIENT
Start: 2020-05-12 | End: 2020-10-26

## 2020-05-12 NOTE — PROGRESS NOTES
Lorena Vang is a 63 y.o. male evaluated via audio only technology on 5/12/2020. Consent: He and/or his health care decision maker is aware that he may receive a bill for this audio only encounter, depending on his insurance coverage, and has provided verbal consent to proceed: Yes    I communicated with the patient and/or health care decision maker about the nature and details of the following:  Assessment & Plan:   HBV  Patient will remain on Viread suppressive therapy indefinately. He is tolerating this well. No new concerns, will plan to reschedule labs for his return office visit in late 8/2020 following repeat imaging. [facility]  Patient has had RFA in 2018 and then recurrence with TARE in 10/2019. Follow-up imaging in 1/2020 and now 5/2020 has shown no recurrence. I have reviewed with the patient that we will need to repeat imaging every 3 months through one year, then decrease to every 6 months. He is in agreement with this plan. Next imaging in 8/2020.   12  Subjective:   Lorena Vang is a 63 y.o. male who was seen for follow-up of HBV and [facility] [address] He has no complaints at this time and is feeling well. He has had recent imaging in follow-up to his liver cancer treatment and presents now for discussion of these findings. Prior to Admission medications    Medication Sig Start Date End Date Taking? Authorizing Provider   lisinopriL (PRINIVIL, ZESTRIL) 20 mg tablet Take 1 Tab by mouth daily. 3/5/20   Latesha Sy MD   tenofovir DISOPROXIL FUMARATE (VIREAD) 300 mg tablet Take 300 mg by mouth daily. Provider, Historical     Allergies   Allergen Reactions    Venom-Honey Bee Anaphylaxis       I affirm this is a Patient-Initiated Episode with a Patient who has not had a related appointment within my department in the past 7 days or scheduled within the next 24 hours.     Total Time: minutes: 11-20 minutes    Note: not billable if this call serves to triage the patient into an appointment for the relevant concern      Zarina Kate PA-C

## 2020-05-13 NOTE — PROGRESS NOTES
Relayed results of CT chest and abdomen with patient and his son during Telehealth visit. Plan to repeat imaging and labs in 3 months.

## 2020-07-25 ENCOUNTER — HOSPITAL ENCOUNTER (OUTPATIENT)
Dept: CT IMAGING | Age: 60
Discharge: HOME | End: 2020-07-25
Attending: PHYSICIAN ASSISTANT
Payer: COMMERCIAL

## 2020-07-25 DIAGNOSIS — C22.0 HEPATOCELLULAR CARCINOMA (HCC): ICD-10-CM

## 2020-07-25 PROCEDURE — 71260 CT THORAX DX C+: CPT

## 2020-07-25 PROCEDURE — 74011636320 HC RX REV CODE- 636/320: Performed by: PHYSICIAN ASSISTANT

## 2020-07-25 PROCEDURE — 74170 CT ABD WO CNTRST FLWD CNTRST: CPT

## 2020-07-25 RX ORDER — SODIUM CHLORIDE 0.9 % (FLUSH) 0.9 %
10 SYRINGE (ML) INJECTION
Status: COMPLETED | OUTPATIENT
Start: 2020-07-25 | End: 2020-07-25

## 2020-07-25 RX ADMIN — IOPAMIDOL 100 ML: 755 INJECTION, SOLUTION INTRAVENOUS at 10:06

## 2020-07-25 RX ADMIN — Medication 10 ML: at 10:06

## 2020-09-02 ENCOUNTER — VIRTUAL VISIT (OUTPATIENT)
Dept: HEMATOLOGY | Age: 60
End: 2020-09-02
Payer: COMMERCIAL

## 2020-09-02 DIAGNOSIS — C22.0 HEPATOCELLULAR CARCINOMA (HCC): Primary | ICD-10-CM

## 2020-09-02 DIAGNOSIS — B18.1 CHRONIC HEPATITIS B (HCC): ICD-10-CM

## 2020-09-02 PROCEDURE — 99213 OFFICE O/P EST LOW 20 MIN: CPT | Performed by: PHYSICIAN ASSISTANT

## 2020-09-02 NOTE — PROGRESS NOTES
Sonal Kaur is a 63 y.o. male evaluated via audio only technology on 9/2/2020. Consent: He and/or his health care decision maker is aware that he may receive a bill for this audio only encounter, depending on his insurance coverage, and has provided verbal consent to proceed: Yes    I communicated with the patient and/or health care decision maker about the nature and details of the following:  Assessment & Plan:   HBV  Patient will remain on Viread suppressive therapy indefinately. He is tolerating this well. No new concerns, will plan to obtain repeat labs at the time of his next office visit in 2 months, 11/2020, following repeat imaging. [address]  Patient has had RFA in 2018 and then recurrence with TARE in 10/2019. Follow-up imaging in 1/2020, 5/2020 and now 7/2020 has shown no recurrence. I have reviewed the findings of the recent CT abd and chest in detail with the patient. I have reviewed with the patient that we will need to repeat imaging every 3 months through one year, then decrease to every 6 months. He is in agreement with this plan. Next imaging in 10/2020.   12  Subjective:   Sonal Kaur is a 63 y.o. male who was seen for follow-up of HBV and [address] He has no complaints at this time and is feeling well. He has had recent imaging in follow-up to his liver cancer treatment and presents now for discussion of these findings. Prior to Admission medications    Medication Sig Start Date End Date Taking? Authorizing Provider   tenofovir DISOPROXIL FUMARATE (Viread) 300 mg tablet Take 1 Tab by mouth daily. 5/12/20   KEY Valdivia   lisinopriL (PRINIVIL, ZESTRIL) 20 mg tablet Take 1 Tab by mouth daily. 3/5/20   Lashon Sy MD     Allergies   Allergen Reactions    Venom-Honey Bee Anaphylaxis       I affirm this is a Patient-Initiated Episode with a Patient who has not had a related appointment within my department in the past 7 days or scheduled within the next 24 hours.     Total Time: minutes: 11-20 minutes    Note: not billable if this call serves to triage the patient into an appointment for the relevant concern      Pa Rick PA-C

## 2020-10-24 ENCOUNTER — HOSPITAL ENCOUNTER (OUTPATIENT)
Dept: CT IMAGING | Age: 60
Discharge: HOME | End: 2020-10-24
Attending: PHYSICIAN ASSISTANT
Payer: COMMERCIAL

## 2020-10-24 DIAGNOSIS — B18.1 CHRONIC HEPATITIS B (HCC): ICD-10-CM

## 2020-10-24 DIAGNOSIS — C22.0 HEPATOCELLULAR CARCINOMA (HCC): ICD-10-CM

## 2020-10-24 PROCEDURE — 71260 CT THORAX DX C+: CPT

## 2020-10-24 PROCEDURE — 74011000636 HC RX REV CODE- 636

## 2020-10-24 PROCEDURE — 74170 CT ABD WO CNTRST FLWD CNTRST: CPT

## 2020-10-24 RX ADMIN — IOPAMIDOL 100 ML: 755 INJECTION, SOLUTION INTRAVENOUS at 11:08

## 2020-10-26 RX ORDER — TENOFOVIR DISOPROXIL FUMARATE 300 MG/1
TABLET, FILM COATED ORAL
Qty: 30 TAB | Refills: 9 | Status: SHIPPED | OUTPATIENT
Start: 2020-10-26 | End: 2021-05-03 | Stop reason: SDUPTHER

## 2020-11-02 ENCOUNTER — OFFICE VISIT (OUTPATIENT)
Dept: HEMATOLOGY | Age: 60
End: 2020-11-02
Payer: COMMERCIAL

## 2020-11-02 VITALS
HEART RATE: 63 BPM | BODY MASS INDEX: 23.39 KG/M2 | DIASTOLIC BLOOD PRESSURE: 84 MMHG | SYSTOLIC BLOOD PRESSURE: 163 MMHG | TEMPERATURE: 98 F | HEIGHT: 67 IN | RESPIRATION RATE: 16 BRPM | OXYGEN SATURATION: 95 % | WEIGHT: 149 LBS

## 2020-11-02 DIAGNOSIS — B18.1 CHRONIC HEPATITIS B (HCC): ICD-10-CM

## 2020-11-02 DIAGNOSIS — C22.0 HEPATOCELLULAR CARCINOMA (HCC): Primary | ICD-10-CM

## 2020-11-02 PROCEDURE — 99214 OFFICE O/P EST MOD 30 MIN: CPT | Performed by: PHYSICIAN ASSISTANT

## 2020-11-02 NOTE — LETTER
11/2/20 Patient: Jihan Melo YOB: 1960 Date of Visit: 11/2/2020 Annita Dumas MD 
[address] [facility] [place] [facility]. [address] VIA In Basket Dear Annita Dumas MD, Thank you for referring Mr. Mary Ross [facility]. My notes for this consultation are attached. If you have questions, please do not hesitate to call me. I look forward to following your patient along with you. Sincerely, KEY Souza

## 2020-11-02 NOTE — PROGRESS NOTES
Reviewed findings with patient and his son at the time of office visit. No interval new or residual lesion of liver or pulmonary changes. Plan repeat in 6 months.

## 2020-11-02 NOTE — PROGRESS NOTES
[facility], Prashant CROCKETT Waynard Ku, MD Sharman Jury, PA-C Velva Och, [facility]     Queta Alva [facility]   Vannessa Dodson FNAKBAR-ENMA [facility]       Noel Romero [address]    at [address], [address] [place] [address], [facility].    [phone]    FAX: [address] [place] [address]    at [address], [address]    [phone]    FAX: [phone]     Patient Care Team:  Mason Jha MD as PCP - General (Family Medicine)  Mason Jha MD as PCP - [facility]  Patria Harkins MD (Cardiology)  Yara Sullivan MD (General Surgery)  Basim Sanchez, DUTCH as Nurse Navigator      Patient Active Problem List   Diagnosis Code    Chronic hepatitis B ([address] B18.1    Liver mass, right lobe R16.0    Pulmonary nodule R91.1    Hepatocellular carcinoma ([address] C22.0    Liver hematoma, infected Q57.664N    H/O resection of liver Z90.49       [facility] returns to the [address] for management of [address] and chronic HBV. The active problem list, all pertinent past medical history, medications, radiologic findings and laboratory findings related to the liver disorder were reviewed with the patient. The patient is a 63 y.o.  male from [place] who was first noted to have abnormalities in liver transaminases in 3/2016. This is his first in-person visit since 1/2020. He has been going for routine follow-up imaging for [address] this year. Serologic studies demonstrated he was positive for HBV and ASMA. Assessment of liver fibrosis with Fibroscan and surgical resection suggested stage 3 bridging fibrosis. He was started on Viread in 6/2016.   The last HBV DNA was undetectable and he has continued to tolerate this medication well and has had no issues with ongoing access to this medication. He developed [address] in segment 8, right lobe in 11/2016. This was treated with surgical resection in 1/2017. He has undergone regular surveillance for [address] following surgical resection. A CT scan in 2/2018 demonstrated a new 1.1 cm lesion in segment 7, right lobe not adjacent to the previous resection site. We had presented him with treatment options for recurrent HCC, he elected at that time to proceed with RFA. He was not open to transplant at that time, but relates that he might consider this as an option in the future if needed. He has been trying to cut back on cigarette use, but is still smoking about a 1/2 ppd and drinking a few beers \"every once in a while. \"    Patient underwent uneventful RFA of the segment 7 lesion on 3/30/2018. He remained without recurrent lesion until imaging with CT in 9/2019 showed a new 3 cm left hepatic hypervascular mass, concerning for hepatoma. He has had Y-90 TARE done on 10/14/2019 and tolerated this procedure well, c/o some minor pain 2 days following the therapy. He is doing well now without abdominal pain, nausea or change in activity levels. Repeat imaging in 1/2020, 5/2020, 7/2020 and 10/2020 have not shown recurrent or new lesions of the chest or abdomen. The patient notes persistent pain in the right side over the liver since the liver resection, this has not gotten any worse and he endorses has lessened over time, this is now not an issue. The patient has not experienced fatigue. The patient completes all daily activities without any functional limitations. He continues on BP medication and monitors this value at home on a routine basis. He reports that this is not generally elevated.     ALLERGIES  Allergies   Allergen Reactions    Venom-Honey Bee Anaphylaxis       MEDICATIONS  Current Outpatient Medications Medication Sig    tenofovir DISOPROXIL FUMARATE (VIREAD) 300 mg tablet TAKE 1 TABLET BY MOUTH ONCE DAILY    lisinopriL (PRINIVIL, ZESTRIL) 20 mg tablet TAKE 1 TABLET BY MOUTH ONCE DAILY     No current facility-administered medications for this visit. SYSTEM REVIEW NOT RELATED TO LIVER DISEASE OR REVIEWED ABOVE:  Constitution systems: Negative for fever, chills, weight gain, weight loss. Eyes: Negative for visual changes. ENT: Negative for sore throat, painful swallowing. Respiratory: Negative for cough, hemoptysis, SOB. Cardiology: Negative for chest pain, palpitations. GI:  Negative for constipation or diarrhea. Some achy/pulling pain inferior to incision, persists - but to a much less extent. : Negative for urinary frequency, dysuria, hematuria, nocturia. Skin: Negative for rash. Diminished sensitivity of the skin overlying the right anterior thigh since surgery. Hematology: Negative for easy bruising, blood clots. Musculo-skeletal: Negative for back pain, muscle pain, weakness. Neurologic: Negative for headaches, dizziness, vertigo, memory problems not related to HE. Psychology: Negative for anxiety, depression. FAMILY HISTORY:  The father  of lung cancer. The patient has no knowledge of the mother's medical condition. There is no family history of liver disease. SOCIAL HISTORY:  The patient is . The patient has 5 children, and 3 grandchildren. The patient currently smokes ~1/2 pack of tobacco daily. He is trying to cut back on cigarettes in general.  The patient has previously consumed alcohol in excess. He now consumes alcoholic beverages on weekends, 1-2 times a month. This represents a significant reduction for him. The patient currently works full time as a .       PHYSICAL EXAMINATION:  Visit Vitals  BP (!) 163/84   Pulse 63   Temp 98 °F (36.7 °C) (Temporal)   Resp 16   Ht 5' 7\" (1.702 m)   Wt 149 lb (67.6 kg)   SpO2 95%   BMI 23.34 kg/m²     General: No acute distress. Eyes: Sclera anicteric. ENT: No oral lesions. Thyroid normal.  Nodes: No adenopathy. Skin: No spider angiomata. No jaundice. No palmar erythema. Respiratory: Lungs clear to auscultation. Cardiovascular: Regular heart rate. No murmurs. No JVD. Abdomen: 9-10 cm RUQ incisional scar, well-approximated without keloid or hernia. No surrounding erythema. Tender with palpation along the inferior border of the liver, which is prominent. Small, reducible umbilical hernia. Spleen not palpable. No obvious ascites. Extremities: No edema. No muscle wasting. No gross arthritic changes. Positive for significant varicose veins on the LLE. Neurologic: Alert and oriented. Cranial nerves grossly intact. No asterixis.     LABORATORY STUDIES:  [address] Units 1/30/2020 10/30/2019   WBC 3.4 - 10.8 x10E3/uL 6.8 6.2   ANC 1.8 - 8.0 K/UL     HGB 13.0 - 17.7 g/dL 15.5 14.3    - 450 x10E3/uL 230 243   INR  CANCELED 1.0   AST 0 - 40 IU/L 30 37   ALT 0 - 44 IU/L 19 24   Alk Phos 39 - 117 IU/L 102 84   Bili, Total 0.0 - 1.2 mg/dL <0.2 0.3   Bili, Direct 0.00 - 0.40 mg/dL 0.05 0.13   Albumin 3.8 - 4.8 g/dL 4.5 4.3   BUN 8 - 27 mg/dL 15 8   Creat 0.76 - 1.27 mg/dL 1.13 0.88   Na 134 - 144 mmol/L 135 133 (L)   K 3.5 - 5.2 mmol/L 4.4 4.2   Cl 96 - 106 mmol/L 97 94 (L)   CO2 20 - 29 mmol/L 21 25   Glucose 65 - 99 mg/dL 94 100 (H)   Magnesium 1.6 - 2.4 mg/dL       Liver [facility] Latest Ref Rng & Units 9/3/2019   WBC 3.4 - 10.8 x10E3/uL 10.1   ANC 1.8 - 8.0 K/UL    HGB 13.0 - 17.7 g/dL 15.3    - 450 x10E3/uL 244   INR  1.0   AST 0 - 40 IU/L 62 (H)   ALT 0 - 44 IU/L 32   Alk Phos 39 - 117 IU/L 74   Bili, Total 0.0 - 1.2 mg/dL 0.6   Bili, Direct 0.00 - 0.40 mg/dL 0.20   Albumin 3.8 - 4.8 g/dL 5.0 (H)   BUN 8 - 27 mg/dL 8   Creat 0.76 - 1.27 mg/dL 0.89   Na 134 - 144 mmol/L 133 (L)   K 3.5 - 5.2 mmol/L 4.1   Cl 96 - 106 mmol/L 90 (L)   CO2 20 - 29 mmol/L 25   Glucose 65 - 99 mg/dL 103 (H)   Magnesium 1.6 - 2.4 mg/dL      Cancer Screening Latest Ref Rng & Units 1/30/2020 10/30/2019 9/3/2019   AFP, Serum 0.0 - 8.0 ng/mL 5.9 4.0 1.8   AFP-L3% 0.0 - 9.9 % Comment Comment Comment     Virology Latest Ref Rng & Units 1/30/2020   HBV DNA IU/mL HBV DNA not detected   Additional lab values drawn at today's office visit are pending at the time of documentation. SEROLOGIES:  Serologies Latest Ref Rng 6/10/2016 4/22/2016   Hep A Ab, Total Negative  Positive (A)   Hep B Surface Ag Negative  Positive (A)   Hep B Core Ab, Total Negative  Positive (A)   Hep B Surface AB QL   Non Reactive   Hep C Ab 0.0 - 0.9 s/co ratio  0.1   Hep D AB Not Detected Not Detected    Ferritin 30 - 400 ng/mL  359   Iron % Saturation 15 - 55 %  76 (HH)   AKIN, IFA   Negative   ASMCA 0 - 19 Units  83 (H)   Alpha-1 antitrypsin level 90 - 200 mg/dL  176   6/2016. HIV Ab negative. HBeAg negative, HBeAb positive. LIVER HISTOLOGY:  6/2016. [facility]. EkPa was 11.8. IQR/med 26%. The results suggested a fibrosis level of F3.  1/2017. Liver resection. 2.2 cm HCC, moderately differentiated with free margins. Adjacent liver shows chronic hepatitis with bridging fibrosis (grade 2, stage 3)    ENDOSCOPIC PROCEDURES:  Not available or performed    RADIOLOGY:  3/2016. Ultrasound of liver. Normal appearing liver. Single hyperecholic mass in the right lobe measuring 1.5 cm. Consistent with hemangioma. 5/2016. CT scan abdomen with and without IV contrast. 1.9 cm hemangioma in the right hepatic lobe. No additional liver lesion. 11/2016. Ultrasound of liver. Increase in size of the right lobe hepatic mass. CT scan or MRI recommended. 11/2016. CT scan abdomen. Interval enlargement of hypervascular segment 8 liverlesion now 2.5 x 1.8 x 3.1 cm.  In the setting of hepatitis B, despite lack of CT evidence of cirrhosis, hepatocellular carcinoma is more likely than an unusual presentation of a benign lesion such as atypical hemangioma. 3/2017. CT of abdomen. No residual enhancing tumor  Gas within the right lobe resection site is unexpected 2 months postoperatively. Surgical packing material, abscess, and less likely retained postoperative gas, are possible. 2017. CT abdomen. No CT evidence of locally recurrent hepatocellular carcinoma, metastatic disease, or new lesions within cirrhotic appearing liver. Resolution of fluid collection. 2018. CT scan abdomen with and without IV contrast.  Changes consistent with chronic liver disease. New 1.1 cm enhancing liver mass with washout and rim enhancement in segment 7, right lobe. No dilated bile ducts. No ascites. 2018. CT abdomen. Wide ablation zone around the segment 6-7 junction HCC. No new hepatic masses. Early arterial phase limits sensitivity for small hypervascular masses. 2019. CT abdomen and chest.  No sign of pulmonary nodule concern. Low-attenuation ablation defect in the periphery of the right lobe of liver in segment 7 slightly increased to equivocal in size. There is no evidence of recurrent or new disease within the liver. 2019. CT abdomen and chest. New left hepatic hypervascular mass, 2.5 x 2.9 cm. concerning for hepatoma. Stable right middle lobe pulmonary micronodules, stable as compared to the 2016 study. 2020. CT abdomen. No new or recurrent liver lesions. Tiny renal hypodensities, likely cysts. 2020. CT abdomen/chest.  No viable tumor, no metastases. 2020. CT abdomen/chest.  No evidence of active/viable tumor, posttreatment changes noted. no metastases in the chest.   10/2020. CT abdomen/chest.  No new or recurrent liver lesion, no pulmonary nodules. OTHER TESTIN2017. Surgical resection of [address] segment 8.  3/2018. RFA treatment to segment 7 lesion. 10/2019. Y-90 TARE.     ASSESSMENT AND PLAN:  Chronic HBV with bridging fibrosis. Liver function is normal.  The platelet count is normal.    Chronic E-antigen negative HBV with undetectable HBV DNA on tenofovir. He continues to tolerate this well. He understands that this will be a life-long medication pending loss of Hep B Surface Ag. [address] treated with surgical resection in 1/2017. Recurrence of [address] in 2/2018. The patient had elected to treat with RFA in 3/2018. Recurrence of [address] in 9/2019. The patient has had TARE Y-90 theraspheres in 10/2019. No new or recurrent lesions on this months' imaging. I have outlined with the patient and his son that he has hd negative imaging by CT scan every 3 months. We will now extend the interval of imaging to every 6 months through 5 years. This will be scheduled prior to his next office visit. Will obtain labs today to verify stable liver function and to assess AFP and HBV suppression. I have reviewed with the patient options for treatment/transplant if recurrence were to occur. Patient would entertain transplant in the future. He is making an attempt to quit cigarettes and has largely eliminated alcohol. We could also consider use of oral anti-tumor agents as needed - he has elected to defer. Pulmonary nodules on chest CT have not changed since 11/2016 and none described since imaging with CT in 9/2019 of the chest.  Will continue to monitor. I have again stressed the importance of smoking cessation. Hypertension. Patient has seen PCP for management and was placed on low dose lisinopril. He is tolerating well but continues to have elevation in BP values. He reports that he monitors this at home as well. The patient was directed to continue all current medications at the current dosages. There are no contraindications for the patient to take any medications that are necessary for treatment of other medical issues. The patient was advised to remain abstinent from alcohol.  He is continuing to drink alcohol on rare occasions. Vaccination for viral hepatitis A is not needed. The patient has serologic evidence of prior exposure or vaccination with immunity. All of the above issues were discussed with the patient. All questions were answered. The patient expressed a clear understanding of the above. [address] [address] in 6 months with CT of the abdomen/chest prior to the next appointment.      Jocy Cary PA-C  Liver [place] [facility], [facility] 22.  [phone]  [address] [place] [address]

## 2020-11-02 NOTE — PROGRESS NOTES
Identified pt with two pt identifiers(name and ). Reviewed record in preparation for visit and have obtained necessary documentation. Chief Complaint   Patient presents with    Hepatitis B     F/U      Vitals:    20 1255 20 1301   BP: (!) 188/86 (!) 163/84   Pulse: 63    Resp: 16    Temp: 98 °F (36.7 °C)    TempSrc: Temporal    SpO2: 95%    Weight: 149 lb (67.6 kg)    Height: 5' 7\" (1.702 m)    PainSc:   0 - No pain        Health Maintenance Review: Patient reminded of \"due or due soon\" health maintenance. I have asked the patient to contact his/her primary care provider (PCP) for follow-up on his/her health maintenance. Coordination of Care Questionnaire:  :   1) Have you been to an emergency room, urgent care, or hospitalized since your last visit? If yes, where when, and reason for visit? no       2. Have seen or consulted any other health care provider since your last visit? If yes, where when, and reason for visit? NO      Patient is accompanied by son I have received verbal consent from Lenea Avila to discuss any/all medical information while they are present in the room.

## 2020-11-03 LAB
AFP L3 MFR SERPL: NORMAL % (ref 0–9.9)
AFP SERPL-MCNC: 2.5 NG/ML (ref 0–8)
BUN SERPL-MCNC: 17 MG/DL (ref 8–27)
BUN/CREAT SERPL: 12 (ref 10–24)
CALCIUM SERPL-MCNC: 9.4 MG/DL (ref 8.6–10.2)
CHLORIDE SERPL-SCNC: 96 MMOL/L (ref 96–106)
CO2 SERPL-SCNC: 22 MMOL/L (ref 20–29)
CREAT SERPL-MCNC: 1.43 MG/DL (ref 0.76–1.27)
ERYTHROCYTE [DISTWIDTH] IN BLOOD BY AUTOMATED COUNT: 12.9 % (ref 11.6–15.4)
GLUCOSE SERPL-MCNC: 92 MG/DL (ref 65–99)
HBV DNA SERPL NAA+PROBE-ACNC: NORMAL IU/ML
HBV DNA SERPL NAA+PROBE-LOG IU: NORMAL LOG10 IU/ML
HCT VFR BLD AUTO: 41.8 % (ref 37.5–51)
HGB BLD-MCNC: 15.1 G/DL (ref 13–17.7)
INR PPP: 1 (ref 0.9–1.2)
MCH RBC QN AUTO: 35.4 PG (ref 26.6–33)
MCHC RBC AUTO-ENTMCNC: 36.1 G/DL (ref 31.5–35.7)
MCV RBC AUTO: 98 FL (ref 79–97)
PLATELET # BLD AUTO: 244 X10E3/UL (ref 150–450)
POTASSIUM SERPL-SCNC: 4.9 MMOL/L (ref 3.5–5.2)
PROTHROMBIN TIME: 10.6 SEC (ref 9.1–12)
RBC # BLD AUTO: 4.26 X10E6/UL (ref 4.14–5.8)
REF LAB TEST REF RANGE: NORMAL
SODIUM SERPL-SCNC: 135 MMOL/L (ref 134–144)
WBC # BLD AUTO: 7.8 X10E3/UL (ref 3.4–10.8)

## 2020-11-04 LAB
ALBUMIN SERPL-MCNC: 4.5 G/DL (ref 3.8–4.8)
ALP SERPL-CCNC: 82 IU/L (ref 39–117)
ALT SERPL-CCNC: 17 IU/L (ref 0–44)
AST SERPL-CCNC: 39 IU/L (ref 0–40)
BILIRUB DIRECT SERPL-MCNC: 0.14 MG/DL (ref 0–0.4)
BILIRUB SERPL-MCNC: 0.4 MG/DL (ref 0–1.2)
PROT SERPL-MCNC: 7.1 G/DL (ref 6–8.5)

## 2020-11-05 DIAGNOSIS — C22.0 HEPATOCELLULAR CARCINOMA (HCC): Primary | ICD-10-CM

## 2020-11-05 NOTE — PROGRESS NOTES
Pt son, Jamel Rice, notified of stable liver function, HBV, and tumor marker. New elevation of creatinine, ?rt dehydration vs chronic htn. Will send lab order to patient for repeat in several weeks and if remains elevated, will need PCP or renal work-up.

## 2021-02-24 ENCOUNTER — OFFICE VISIT (OUTPATIENT)
Dept: SURGERY | Age: 61
End: 2021-02-24
Payer: COMMERCIAL

## 2021-02-24 ENCOUNTER — TELEPHONE (OUTPATIENT)
Dept: SURGERY | Age: 61
End: 2021-02-24

## 2021-02-24 VITALS
HEIGHT: 66 IN | OXYGEN SATURATION: 94 % | SYSTOLIC BLOOD PRESSURE: 190 MMHG | HEART RATE: 61 BPM | DIASTOLIC BLOOD PRESSURE: 94 MMHG | TEMPERATURE: 98.5 F | WEIGHT: 151.6 LBS | RESPIRATION RATE: 16 BRPM | BODY MASS INDEX: 24.36 KG/M2

## 2021-02-24 DIAGNOSIS — M79.89 MASS OF SOFT TISSUE OF RIGHT LOWER EXTREMITY: Primary | ICD-10-CM

## 2021-02-24 PROCEDURE — 99212 OFFICE O/P EST SF 10 MIN: CPT | Performed by: SURGERY

## 2021-02-24 NOTE — PROGRESS NOTES
Subjective:      Sriram Joshua  is a 57 y.o. male presents for evaluation of RIGHT thigh mass. Pt reports RIGHT thigh mass has been present for years and has slowly enlarged over that time. He states area is tender. Pt denies any drainage. Pt previously seen in 2017 for evaluation of liver mass. He underwent partial RIGHT hepatic liver lobectomy for hepatoma on 01/06/17. Final surgical PATH: 2 cm grade 2 moderately differentiated hepatocellular carcinoma, surgical margins negative, adjacent liver parenchyma with chronic hepatitis with bridging fibrosis (grade 2, stage 3) consistent with hep C infection, pT2 pNX. Since then, pt has developed two recurrences that were treated with ablation and     Pt is accompanied today by his son, who is helping translate as pt primarily speaks [place]an. Past Medical History:   Diagnosis Date    Arthritis     [facility] ([facility]     Hepatoma ([facility] 1/6/2017    Hypertension     PT DENIES    Liver disease     HEPATITIS B    Liver mass, right lobe 4/22/2016    Mass of soft tissue of right lower extremity 2/24/2021    Pulmonary nodule 06/2016    right middle lobe, recheck 11/16 stable at 2 mm, recheck again in 6 mo    Ulnar nerve compression     right       Past Surgical History:   Procedure Laterality Date    HX CHOLECYSTECTOMY      HX HEENT  1997    JAW SURGERY-A. A.    HX ORTHOPAEDIC Right 2014    ELBOW    HX OTHER SURGICAL  01/06/2017    partial right [id number]. David Villarreal    IR OCCL TXCATH ORGAN W SI  10/14/2019       Social History     Tobacco Use    Smoking status: Current Every Day Smoker     Packs/day: 0.50     Years: 20.00     Pack years: 10.00     Types: Cigarettes    Smokeless tobacco: Never Used   Substance Use Topics    Alcohol use: No     Alcohol/week: 0.0 standard drinks     Comment: quit 11/2016       Family History   Problem Relation Age of Onset    Anesth Problems Neg Hx        Current Outpatient Medications on File Prior to Visit   Medication Sig Dispense Refill    lisinopriL (PRINIVIL, ZESTRIL) 40 mg tablet Take 1 Tab by mouth daily. 90 Tab 1    tenofovir DISOPROXIL FUMARATE (VIREAD) 300 mg tablet TAKE 1 TABLET BY MOUTH ONCE DAILY 30 Tab 9     No current facility-administered medications on file prior to visit. Allergies   Allergen Reactions    Venom-Honey Bee Anaphylaxis       Review of Systems:  Constitutional: No fever or chills  Neurologic: No headache  Eyes: No scleral icterus or irritated eyes  Nose: No nasal pain or drainage  Mouth: No oral lesions or sore throat  Cardiac: No palpations or chest pain  Pulmonary: No cough or shortness or breath  Gastrointestinal: No nausea, emesis, diarrhea, or constipation  Genitourinary: No dysuria  Musculoskeletal: No muscle or joint tenderness  Skin: Positive for nodule  Psychiatric: No anxiety or depressed mood    Objective:     Visit Vitals  BP (!) 190/94 (BP 1 Location: Left upper arm, BP Patient Position: Sitting, BP Cuff Size: Adult)   Pulse 61   Temp 98.5 °F (36.9 °C) (Oral)   Resp 16   Ht 5' 6\" (1.676 m)   Wt 151 lb 9.6 oz (68.8 kg)   SpO2 94%   BMI 24.47 kg/m²        Physical Exam:  General: No acute distress, conversant  Eyes: PERRLA, no scleral icterus  HENT: Normocephalic without oral lesions  Neck: Trachea midline without LAD  Cardiac: Normal pulse rate and rhythm  Pulmonary: Symmetric chest rise with normal effort  GI: Soft, NT, ND, no hernia, no splenomegaly  Skin: Warm without rash  Extremities: No edema or joint stiffness; RIGHT anterior thigh with 7 x 4 cm fairly soft mass with a moderate amount of telangiectasias in the surrounding skin. Psych: Appropriate mood and affect    Labs: No results found for this or any previous visit (from the past 24 hour(s)). Assessment and Plan:       ICD-10-CM ICD-9-CM    1. Mass of soft tissue of right lower extremity  M79.89 729.99        Possible cyst or subcutaneous lipoma.  Will plan for excision in the OR under local anesthesia. Reviewed the details of this procedure and what pt should expect for recovery. This will be an outpatient procedure and pt will be sent home following surgery. All questions were answered and pt is in agreement with this plan. The patient was counseled at length about the risks of misael Covid-19 during their perioperative period and any recovery window from their procedure. The patient was made aware that misael Covid-19  may worsen their prognosis for recovering from their procedure and lend to a higher morbidity and/or mortality risk. All material risks, benefits, and reasonable alternatives including postponing the procedure were discussed. The patient does  wish to proceed with the procedure at this time. Total face to face time with patient: 15 minutes. Greater than 50% of the time was spent in counseling. This document was scribed by Ivania Foy as dictated by Dr. Fish Patino.      Signed By: Mamta Williamson MD     03/03/21

## 2021-02-24 NOTE — LETTER
3/3/2021 Patient: Murray Connelly YOB: 1960 Date of Visit: 2/24/2021 Olivia Renteria MD 
[address] [place] [address] [id number]. [phone] [address] [employer] Dear Olivia Renteria MD, Thank you for referring Mr. Ravi Casillas to [facility] for evaluation. My notes for this consultation are attached. If you have questions, please do not hesitate to call me. I look forward to following your patient along with you. Sincerely, Beni Khan MD

## 2021-02-24 NOTE — PROGRESS NOTES
Subjective:  
  
Christian Allen  is a 61 y.o. male referred by *** for evaluation of RIGHT leg cyst.  
 
Past Medical History:  
Diagnosis Date  
• Arthritis   
• Hepatitis B carrier (HCC)   
• Hepatoma (HCC) 1/6/2017  
• Hypertension   
 PT DENIES  
• Liver disease   
 HEPATITIS B  
• Liver mass, right lobe 4/22/2016  
• Pulmonary nodule 06/2016  
 right middle lobe, recheck 11/16 stable at 2 mm, recheck again in 6 mo  
• Ulnar nerve compression   
 right  
 
 
Past Surgical History:  
Procedure Laterality Date  
• HX CHOLECYSTECTOMY    
• HX HEENT  1997  
 JAW SURGERY-A.A.  
• HX ORTHOPAEDIC Right 2014  
 ELBOW  
• HX OTHER SURGICAL  01/06/2017  
 partial right [id number]. [facility]. [facility]  
• IR OCCL TXCATH ORGAN W SI  10/14/2019  
 
 
Social History  
 
Tobacco Use  
• Smoking status: Current Every Day Smoker  
  Packs/day: 0.50  
  Years: 20.00  
  Pack years: 10.00  
  Types: Cigarettes  
• Smokeless tobacco: Never Used  
Substance Use Topics  
• Alcohol use: No  
  Alcohol/week: 0.0 standard drinks  
  Comment: quit 11/2016  
 
 
Family History  
Problem Relation Age of Onset  
• Anesth Problems Neg Hx   
 
 
Current Outpatient Medications on File Prior to Visit  
Medication Sig Dispense Refill  
• lisinopriL (PRINIVIL, ZESTRIL) 40 mg tablet Take 1 Tab by mouth daily. 90 Tab 1  
• tenofovir DISOPROXIL FUMARATE (VIREAD) 300 mg tablet TAKE 1 TABLET BY MOUTH ONCE DAILY 30 Tab 9  
 
No current facility-administered medications on file prior to visit.   
 
 
Allergies  
Allergen Reactions  
• Venom-Honey Bee Anaphylaxis  
 
 
 
Review of Systems:   
A comprehensive review of systems was negative except for that written in the History of Present Illness. 
 
Objective:  
 
There were no vitals taken for this visit.  
 
Physical Exam: 
{Exam, Complete:[id number]:::0} 
*** 
SKIN:  
 
Labs: No results found for this or any previous visit (from the past 24 hour(s)). 
 
 Data Review: All previous imaging, testing and lab work was reviewed and interpreted. {Labs and RAD Results:[id number]:::0} Assessment and Plan:  
 
{No Diagnosis Found} *** Total face to face time with patient: *** minutes. Greater than 50% of the time was spent in counseling. This document was scribed by Antonio Mclean as dictated by Dr. Oni Rosas. Signed By: Rizwan White MD   
 02/24/21

## 2021-02-24 NOTE — PROGRESS NOTES
1. Have you been to the ER, urgent care clinic since your last visit? Hospitalized since your last visit? No    2. Have you seen or consulted any other health care providers outside of the [address] [place] [address] since your last visit? Include any pap smears or colon screening.   [facility] right leg cyst 2/19/2021

## 2021-03-08 ENCOUNTER — TRANSCRIBE ORDER (OUTPATIENT)
Dept: REGISTRATION | Age: 61
End: 2021-03-08

## 2021-03-08 ENCOUNTER — HOSPITAL ENCOUNTER (OUTPATIENT)
Dept: PREADMISSION TESTING | Age: 61
Discharge: HOME | End: 2021-03-08
Payer: COMMERCIAL

## 2021-03-08 DIAGNOSIS — Z01.812 PRE-PROCEDURE LAB EXAM: Primary | ICD-10-CM

## 2021-03-08 DIAGNOSIS — Z01.812 PRE-PROCEDURE LAB EXAM: ICD-10-CM

## 2021-03-08 PROCEDURE — U0003 INFECTIOUS AGENT DETECTION BY NUCLEIC ACID (DNA OR RNA); SEVERE ACUTE RESPIRATORY SYNDROME CORONAVIRUS 2 (SARS-COV-2) (CORONAVIRUS DISEASE [COVID-19]), AMPLIFIED PROBE TECHNIQUE, MAKING USE OF HIGH THROUGHPUT TECHNOLOGIES AS DESCRIBED BY CMS-2020-01-R: HCPCS

## 2021-03-09 LAB — SARS-COV-2, COV2NT: NOT DETECTED

## 2021-03-10 RX ORDER — BUPIVACAINE HYDROCHLORIDE AND EPINEPHRINE 5; 5 MG/ML; UG/ML
30 INJECTION, SOLUTION EPIDURAL; INTRACAUDAL; PERINEURAL ONCE
Status: CANCELLED | OUTPATIENT
Start: 2021-03-10 | End: 2021-03-10

## 2021-03-11 RX ORDER — MORPHINE SULFATE 2 MG/ML
2 INJECTION, SOLUTION INTRAMUSCULAR; INTRAVENOUS
Status: CANCELLED | OUTPATIENT
Start: 2021-03-11

## 2021-03-11 RX ORDER — SODIUM CHLORIDE, SODIUM LACTATE, POTASSIUM CHLORIDE, CALCIUM CHLORIDE 600; 310; 30; 20 MG/100ML; MG/100ML; MG/100ML; MG/100ML
125 INJECTION, SOLUTION INTRAVENOUS CONTINUOUS
Status: CANCELLED | OUTPATIENT
Start: 2021-03-11

## 2021-03-11 RX ORDER — OXYCODONE HYDROCHLORIDE 5 MG/1
5 TABLET ORAL AS NEEDED
Status: CANCELLED | OUTPATIENT
Start: 2021-03-11

## 2021-03-11 RX ORDER — ONDANSETRON 2 MG/ML
4 INJECTION INTRAMUSCULAR; INTRAVENOUS AS NEEDED
Status: CANCELLED | OUTPATIENT
Start: 2021-03-11

## 2021-03-11 RX ORDER — DIPHENHYDRAMINE HYDROCHLORIDE 50 MG/ML
12.5 INJECTION, SOLUTION INTRAMUSCULAR; INTRAVENOUS AS NEEDED
Status: CANCELLED | OUTPATIENT
Start: 2021-03-11 | End: 2021-03-11

## 2021-03-11 RX ORDER — SODIUM CHLORIDE 0.9 % (FLUSH) 0.9 %
5-40 SYRINGE (ML) INJECTION EVERY 8 HOURS
Status: CANCELLED | OUTPATIENT
Start: 2021-03-11

## 2021-03-11 RX ORDER — FENTANYL CITRATE 50 UG/ML
25 INJECTION, SOLUTION INTRAMUSCULAR; INTRAVENOUS
Status: CANCELLED | OUTPATIENT
Start: 2021-03-11

## 2021-03-11 RX ORDER — MIDAZOLAM HYDROCHLORIDE 1 MG/ML
1 INJECTION, SOLUTION INTRAMUSCULAR; INTRAVENOUS
Status: CANCELLED | OUTPATIENT
Start: 2021-03-11

## 2021-03-11 RX ORDER — SODIUM CHLORIDE 0.9 % (FLUSH) 0.9 %
5-40 SYRINGE (ML) INJECTION AS NEEDED
Status: CANCELLED | OUTPATIENT
Start: 2021-03-11

## 2021-03-12 ENCOUNTER — HOSPITAL ENCOUNTER (OUTPATIENT)
Age: 61
Setting detail: OUTPATIENT SURGERY
Discharge: HOME | End: 2021-03-12
Attending: SURGERY | Admitting: SURGERY
Payer: COMMERCIAL

## 2021-03-12 VITALS
TEMPERATURE: 98.1 F | HEIGHT: 66 IN | WEIGHT: 145 LBS | BODY MASS INDEX: 23.3 KG/M2 | HEART RATE: 55 BPM | RESPIRATION RATE: 16 BRPM | SYSTOLIC BLOOD PRESSURE: 168 MMHG | OXYGEN SATURATION: 96 % | DIASTOLIC BLOOD PRESSURE: 83 MMHG

## 2021-03-12 DIAGNOSIS — M79.89 MASS OF SOFT TISSUE OF RIGHT LOWER EXTREMITY: ICD-10-CM

## 2021-03-12 DIAGNOSIS — G89.18 POST-OP PAIN: Primary | ICD-10-CM

## 2021-03-12 PROCEDURE — 77030002996 HC SUT SLK J&J -A: Performed by: SURGERY

## 2021-03-12 PROCEDURE — 77030010507 HC ADH SKN DERMBND J&J -B: Performed by: SURGERY

## 2021-03-12 PROCEDURE — 77030031139 HC SUT VCRL2 J&J -A: Performed by: SURGERY

## 2021-03-12 PROCEDURE — 74011000250 HC RX REV CODE- 250: Performed by: SURGERY

## 2021-03-12 PROCEDURE — 76010000138 HC OR TIME 0.5 TO 1 HR: Performed by: SURGERY

## 2021-03-12 PROCEDURE — 88304 TISSUE EXAM BY PATHOLOGIST: CPT

## 2021-03-12 PROCEDURE — 77030040361 HC SLV COMPR DVT MDII -B: Performed by: SURGERY

## 2021-03-12 PROCEDURE — 11404 EXC TR-EXT B9+MARG 3.1-4 CM: CPT | Performed by: SURGERY

## 2021-03-12 PROCEDURE — 74011250637 HC RX REV CODE- 250/637: Performed by: ANESTHESIOLOGY

## 2021-03-12 PROCEDURE — 77030042556 HC PNCL CAUT -B: Performed by: SURGERY

## 2021-03-12 PROCEDURE — 2709999900 HC NON-CHARGEABLE SUPPLY: Performed by: SURGERY

## 2021-03-12 PROCEDURE — 76210000020 HC REC RM PH II FIRST 0.5 HR: Performed by: SURGERY

## 2021-03-12 PROCEDURE — 77030002933 HC SUT MCRYL J&J -A: Performed by: SURGERY

## 2021-03-12 PROCEDURE — 74011250636 HC RX REV CODE- 250/636: Performed by: ANESTHESIOLOGY

## 2021-03-12 RX ORDER — ACETAMINOPHEN 325 MG/1
650 TABLET ORAL ONCE
Status: COMPLETED | OUTPATIENT
Start: 2021-03-12 | End: 2021-03-12

## 2021-03-12 RX ORDER — MIDAZOLAM HYDROCHLORIDE 1 MG/ML
1 INJECTION, SOLUTION INTRAMUSCULAR; INTRAVENOUS AS NEEDED
Status: DISCONTINUED | OUTPATIENT
Start: 2021-03-12 | End: 2021-03-12 | Stop reason: HOSPADM

## 2021-03-12 RX ORDER — FENTANYL CITRATE 50 UG/ML
50 INJECTION, SOLUTION INTRAMUSCULAR; INTRAVENOUS AS NEEDED
Status: DISCONTINUED | OUTPATIENT
Start: 2021-03-12 | End: 2021-03-12 | Stop reason: HOSPADM

## 2021-03-12 RX ORDER — SODIUM CHLORIDE 0.9 % (FLUSH) 0.9 %
5-40 SYRINGE (ML) INJECTION AS NEEDED
Status: DISCONTINUED | OUTPATIENT
Start: 2021-03-12 | End: 2021-03-12 | Stop reason: HOSPADM

## 2021-03-12 RX ORDER — DEXTROSE, SODIUM CHLORIDE, SODIUM LACTATE, POTASSIUM CHLORIDE, AND CALCIUM CHLORIDE 5; .6; .31; .03; .02 G/100ML; G/100ML; G/100ML; G/100ML; G/100ML
125 INJECTION, SOLUTION INTRAVENOUS CONTINUOUS
Status: DISCONTINUED | OUTPATIENT
Start: 2021-03-12 | End: 2021-03-12 | Stop reason: HOSPADM

## 2021-03-12 RX ORDER — LIDOCAINE HYDROCHLORIDE 10 MG/ML
0.5 INJECTION, SOLUTION EPIDURAL; INFILTRATION; INTRACAUDAL; PERINEURAL AS NEEDED
Status: DISCONTINUED | OUTPATIENT
Start: 2021-03-12 | End: 2021-03-12 | Stop reason: HOSPADM

## 2021-03-12 RX ORDER — SODIUM CHLORIDE, SODIUM LACTATE, POTASSIUM CHLORIDE, CALCIUM CHLORIDE 600; 310; 30; 20 MG/100ML; MG/100ML; MG/100ML; MG/100ML
125 INJECTION, SOLUTION INTRAVENOUS CONTINUOUS
Status: DISCONTINUED | OUTPATIENT
Start: 2021-03-12 | End: 2021-03-12 | Stop reason: HOSPADM

## 2021-03-12 RX ORDER — HYDROCODONE BITARTRATE AND ACETAMINOPHEN 5; 325 MG/1; MG/1
1 TABLET ORAL
Qty: 9 TAB | Refills: 0 | Status: SHIPPED | OUTPATIENT
Start: 2021-03-12 | End: 2021-03-15

## 2021-03-12 RX ORDER — SODIUM CHLORIDE 0.9 % (FLUSH) 0.9 %
5-40 SYRINGE (ML) INJECTION EVERY 8 HOURS
Status: DISCONTINUED | OUTPATIENT
Start: 2021-03-12 | End: 2021-03-12 | Stop reason: HOSPADM

## 2021-03-12 RX ORDER — BUPIVACAINE HYDROCHLORIDE AND EPINEPHRINE 5; 5 MG/ML; UG/ML
INJECTION, SOLUTION EPIDURAL; INTRACAUDAL; PERINEURAL AS NEEDED
Status: DISCONTINUED | OUTPATIENT
Start: 2021-03-12 | End: 2021-03-12 | Stop reason: HOSPADM

## 2021-03-12 RX ADMIN — ACETAMINOPHEN 650 MG: 325 TABLET ORAL at 07:58

## 2021-03-12 RX ADMIN — SODIUM CHLORIDE, POTASSIUM CHLORIDE, SODIUM LACTATE AND CALCIUM CHLORIDE 125 ML/HR: 600; 310; 30; 20 INJECTION, SOLUTION INTRAVENOUS at 08:12

## 2021-03-12 NOTE — PERIOP NOTES
Patient is a Bahamian speaker; consent for surgery obtained through  Emily Gaston at [facility]. opportunities for questions were given and answered through the service.

## 2021-03-12 NOTE — BRIEF OP NOTE
Brief Postoperative Note    Patient: Lucy Armstrong  YOB: 1960  MRN: [id number]    Date of Procedure: 3/12/2021     Pre-Op Diagnosis: LIPOMA OF RIGHT THIGH    Post-Op Diagnosis: sebaceous cyst      Procedure(s):  EXCISION OF LARGE SEBACEOUS CYST OF THE RIGHT THIGH    Surgeon(s):  Virginia Paniagua MD    Surgical Assistant: Surg Asst-1: Danie Briones    Anesthesia: Local     Estimated Blood Loss (mL): Minimal    Complications: None    Specimens:   ID Type Source Tests Collected by Time Destination   1 : right thigh sebaceous cyst Fresh Thigh  Virginia Paniagua MD 3/12/2021 0932 Pathology        Implants: * No implants in log *    Drains: * No LDAs found *    Findings: 4.5 cm cyst of skin of thigh    Electronically Signed by Varun Curry MD on 3/12/2021 at 9:41 AM  [id number]

## 2021-03-12 NOTE — DISCHARGE INSTRUCTIONS
Patient Discharge Instructions    Rosy Ortiz / [id number] : 1960    Admitted 3/12/2021 Discharged: 3/12/2021     Take Home Medications            · It is important that you take the medication exactly as they are prescribed. · Keep your medication in the bottles provided by the pharmacist and keep a list of the medication names, dosages, and times to be taken in your wallet. · Do not take other medications without consulting your doctor. What to do at Home    Recommended diet: Regular Diet,     Recommended activity: Activity as tolerated, may shower whenever you wish          Follow-up Appointments   Procedures    FOLLOW UP VISIT Appointment in: Two Weeks     Standing Status:   Standing     Number of Occurrences:   1     Order Specific Question:   Appointment in     Answer: Two Weeks           Information obtained by :  I understand that if any problems occur once I am at home I am to contact my physician. I understand and acknowledge receipt of the instructions indicated above.                                                                                                                                            Physician's or R.N.'s Signature                                                                  Date/Time                                                                                                                                              Patient or Representative Signature                                                          Date/Time      LIPOMA OF RIGHT THIGH

## 2021-03-12 NOTE — OP NOTES
[address]   OPERATIVE REPORT    Name:  Gayatri Mccain  MR#:  [id number]  :  1960  ACCOUNT #:  [de-identified]  DATE OF SERVICE:  2021    PREOPERATIVE DIAGNOSIS:  Lipoma of the right thigh. POSTOPERATIVE DIAGNOSIS:  Huge sebaceous cyst of the skin of the right thigh. PROCEDURE PERFORMED:  Excision of sebaceous cyst of the right thigh. SURGEON:  Elke Kwan MD    ASSISTANT:  Henrietta Langley SA    ANESTHESIA:  0.5% Marcaine with epinephrine. COMPLICATIONS:  None. SPECIMENS REMOVED:  A cyst.    IMPLANTS:  None. ESTIMATED BLOOD LOSS:  Minimal.    DRAINS:  None. FINDINGS:  About a 4.5 cm cyst of the skin of the thigh. PROCEDURE:  An informed consent was obtained. The patient was on the stretcher and the right leg was prepared with Chloraprep and draped as a field. 0.5% Marcaine with epinephrine was infiltrated all around the area. A vertical incision was made and I immediately entered into the cyst and evacuated the cyst and then a sharp dissection with scalpel and scissors dissected the cyst completely from its very tight attachments to the overlying skin and then to the soft tissue filmy adhesions. The cyst was removed in toto. Wound was irrigated with saline. Several little bleeding spots were coagulated. Subcutaneous tissues were reapproximated with Vicryl and the skin was closed with subcuticular Monocryl followed by Dermabond. At the termination of the procedure, sponge, needle and instrument counts were correct. The patient tolerated this well and was brought to the PACU in satisfactory condition.       Anant Viramontes MD      [id number]  D:  2021 9:45  T:  2021 15:19  JOB #:  [id number]  CC:  Nils Marte MD

## 2021-03-12 NOTE — H&P
Subjective:      Murray Connelly  is a 57 y.o. male presents for evaluation of RIGHT thigh mass. Pt reports RIGHT thigh mass has been present for years and has slowly enlarged over that time. He states area is tender. Pt denies any drainage. Pt previously seen in 2017 for evaluation of liver mass. He underwent partial RIGHT hepatic liver lobectomy for hepatoma on 01/06/17. Final surgical PATH: 2 cm grade 2 moderately differentiated hepatocellular carcinoma, surgical margins negative, adjacent liver parenchyma with chronic hepatitis with bridging fibrosis (grade 2, stage 3) consistent with hep C infection, pT2 pNX. Since then, pt has developed two recurrences that were treated with ablation and      Pt is accompanied today by his son, who is helping translate as pt primarily speaks Kosovan. Past Medical History:   Diagnosis Date    Arthritis      Hep[facility] B carrier ([facility]      Hepatoma ([facility] 1/6/2017    Hypertension       PT DENIES    Liver disease       HEPATITIS B    Liver mass, right lobe 4/22/2016    Mass of soft tissue of right lower extremity 2/24/2021    Pulmonary nodule 06/2016     right middle lobe, recheck 11/16 stable at 2 mm, recheck again in 6 mo    Ulnar nerve compression       right               Past Surgical History:   Procedure Laterality Date    HX CHOLECYSTECTOMY        HX HEENT   1997     JAW SURGERY-A. A.    HX ORTHOPAEDIC Right 2014     ELBOW    HX OTHER SURGICAL   01/06/2017     partial right [id number].  Tressia Bears    IR OCCL TXCATH ORGAN W SI   10/14/2019         Social History            Tobacco Use    Smoking status: Current Every Day Smoker       Packs/day: 0.50       Years: 20.00       Pack years: 10.00       Types: Cigarettes    Smokeless tobacco: Never Used   Substance Use Topics    Alcohol use: No       Alcohol/week: 0.0 standard drinks       Comment: quit 11/2016               Family History   Problem Relation Age of Onset  Anesth Problems Neg Hx                  Current Outpatient Medications on File Prior to Visit   Medication Sig Dispense Refill    lisinopriL (PRINIVIL, ZESTRIL) 40 mg tablet Take 1 Tab by mouth daily. 90 Tab 1    tenofovir DISOPROXIL FUMARATE (VIREAD) 300 mg tablet TAKE 1 TABLET BY MOUTH ONCE DAILY 30 Tab 9      No current facility-administered medications on file prior to visit. Allergies   Allergen Reactions    Venom-Honey Bee Anaphylaxis         Review of Systems:  Constitutional: No fever or chills  Neurologic: No headache  Eyes: No scleral icterus or irritated eyes  Nose: No nasal pain or drainage  Mouth: No oral lesions or sore throat  Cardiac: No palpations or chest pain  Pulmonary: No cough or shortness or breath  Gastrointestinal: No nausea, emesis, diarrhea, or constipation  Genitourinary: No dysuria  Musculoskeletal: No muscle or joint tenderness  Skin: Positive for nodule  Psychiatric: No anxiety or depressed mood     Objective:      Visit Vitals  BP (!) 190/94 (BP 1 Location: Left upper arm, BP Patient Position: Sitting, BP Cuff Size: Adult)   Pulse 61   Temp 98.5 °F (36.9 °C) (Oral)   Resp 16   Ht 5' 6\" (1.676 m)   Wt 151 lb 9.6 oz (68.8 kg)   SpO2 94%   BMI 24.47 kg/m²         Physical Exam:  General: No acute distress, conversant  Eyes: PERRLA, no scleral icterus  HENT: Normocephalic without oral lesions  Neck: Trachea midline without LAD  Cardiac: Normal pulse rate and rhythm  Pulmonary: Symmetric chest rise with normal effort  GI: Soft, NT, ND, no hernia, no splenomegaly  Skin: Warm without rash  Extremities: No edema or joint stiffness; RIGHT anterior thigh with 7 x 4 cm fairly soft mass with a moderate amount of telangiectasias in the surrounding skin. Psych: Appropriate mood and affect     Labs: No results found for this or any previous visit (from the past 24 hour(s)). Assessment and Plan:          ICD-10-CM ICD-9-CM     1.  Mass of soft tissue of right lower extremity M79.89 729.99           Possible cyst or subcutaneous lipoma. Will plan for excision in the OR under local anesthesia. Reviewed the details of this procedure and what pt should expect for recovery. This will be an outpatient procedure and pt will be sent home following surgery. All questions were answered and pt is in agreement with this plan. The patient was counseled at length about the risks of misael Covid-19 during their perioperative period and any recovery window from their procedure. The patient was made aware that misael Covid-19  may worsen their prognosis for recovering from their procedure and lend to a higher morbidity and/or mortality risk. All material risks, benefits, and reasonable alternatives including postponing the procedure were discussed. The patient does  wish to proceed with the procedure at this time.                                                      ·

## 2021-03-29 ENCOUNTER — OFFICE VISIT (OUTPATIENT)
Dept: SURGERY | Age: 61
End: 2021-03-29
Payer: COMMERCIAL

## 2021-03-29 VITALS
BODY MASS INDEX: 24.43 KG/M2 | TEMPERATURE: 99.3 F | DIASTOLIC BLOOD PRESSURE: 93 MMHG | OXYGEN SATURATION: 96 % | WEIGHT: 152 LBS | HEART RATE: 65 BPM | RESPIRATION RATE: 16 BRPM | HEIGHT: 66 IN | SYSTOLIC BLOOD PRESSURE: 193 MMHG

## 2021-03-29 DIAGNOSIS — Z09 POSTOPERATIVE EXAMINATION: Primary | ICD-10-CM

## 2021-03-29 PROCEDURE — 99024 POSTOP FOLLOW-UP VISIT: CPT | Performed by: SURGERY

## 2021-03-29 NOTE — LETTER
3/29/2021 Patient: Graeme Cullen YOB: 1960 Date of Visit: 3/29/2021 Smitha James MD 
[facility] [phone]. [phone] [address] [employer] Dear Smitha James MD, Thank you for referring Mr. Kendra Catalan to [facility] for evaluation. My notes for this consultation are attached. If you have questions, please do not hesitate to call me. I look forward to following your patient along with you. Sincerely, Lucila Dakins, MD

## 2021-03-29 NOTE — PROGRESS NOTES
1. Have you been to the ER, urgent care clinic since your last visit? Hospitalized since your last visit? No    2. Have you seen or consulted any other health care providers outside of the [address] [place] [address] since your last visit? Include any pap smears or colon screening. No    Patients son is here with him today.

## 2021-03-29 NOTE — PROGRESS NOTES
Subjective:      Mark Childress  is a 57 y.o. male presents for f/u s/p excision large RIGHT thigh sebaceous cyst on 03/12/21. Final surgical PATH consistent with epidermal inclusion cyst.     Pt reports he has done well since surgery. Pt denies any signs or symptoms of infection. Pt is accompanied today by his son, who is helping translate as pt primarily speaks Kittitian. HISTORY:      Pt previously seen in 2017 for evaluation of liver mass. He underwent partial RIGHT hepatic liver lobectomy for hepatoma on 01/06/17. Final surgical PATH: 2 cm grade 2 moderately differentiated hepatocellular carcinoma, surgical margins negative, adjacent liver parenchyma with chronic hepatitis with bridging fibrosis (grade 2, stage 3) consistent with hep C infection, pT2 pNX. Since then, pt has developed two recurrences that were treated with ablation and     Pt returned in February 2021 c/o RIGHT thigh mass that has been present for years and has been slowly enlarging over time. He states area is tender. Pt denies any drainage. Past Medical History:   Diagnosis Date    Arthritis     Hep[facility] B carrier ([facility]     Hepatoma ([facility] 1/6/2017    Hypertension     PT DENIES    Liver disease     HEPATITIS B    Liver mass, right lobe 4/22/2016    Mass of soft tissue of right lower extremity 2/24/2021    Pulmonary nodule 06/2016    right middle lobe, recheck 11/16 stable at 2 mm, recheck again in 6 mo    Ulnar nerve compression     right       Past Surgical History:   Procedure Laterality Date     [address]    HX LAP CHOLECYSTECTOMY      HX LIPOMA RESECTION  03/2021    HX ORTHOPAEDIC Right 2014    ELBOW    HX OTHER SURGICAL  01/06/2017    partial right [id number]-[id number]. Kezia Linares    IR OCCL TXCATH ORGAN W SI  10/14/2019       Social History     Tobacco Use    Smoking status: Current Every Day Smoker     Packs/day: 1.00     Years: 30.00     Pack years: 30.00     Types: Cigarettes    Smokeless tobacco: Never Used   Substance Use Topics    Alcohol use: No     Alcohol/week: 0.0 standard drinks     Comment: quit 11/2016       Family History   Problem Relation Age of Onset    No Known Problems Mother     No Known Problems Father     No Known Problems Sister     No Known Problems Sister     Anesth Problems Neg Hx        Current Outpatient Medications on File Prior to Visit   Medication Sig Dispense Refill    lisinopriL (PRINIVIL, ZESTRIL) 40 mg tablet Take 1 Tab by mouth daily. 90 Tab 1    tenofovir DISOPROXIL FUMARATE (VIREAD) 300 mg tablet TAKE 1 TABLET BY MOUTH ONCE DAILY 30 Tab 9     No current facility-administered medications on file prior to visit. Allergies   Allergen Reactions    Venom-Honey Bee Anaphylaxis       Review of Systems:  Constitutional: No fever or chills  Neurologic: No headache  Eyes: No scleral icterus or irritated eyes  Nose: No nasal pain or drainage  Mouth: No oral lesions or sore throat  Cardiac: No palpations or chest pain  Pulmonary: No cough or shortness or breath  Gastrointestinal: No nausea, emesis, diarrhea, or constipation  Genitourinary: No dysuria  Musculoskeletal: No muscle or joint tenderness  Skin: No rashes or lesions  Psychiatric: No anxiety or depressed mood    Objective:     Visit Vitals  BP (!) 193/93 (BP 1 Location: Left upper arm, BP Patient Position: Sitting, BP Cuff Size: Large adult)   Pulse 65   Temp 99.3 °F (37.4 °C) (Oral)   Resp 16   Ht 5' 6\" (1.676 m)   Wt 152 lb (68.9 kg)   SpO2 96%   BMI 24.53 kg/m²        Physical Exam:  General: No acute distress, conversant  Eyes: PERRLA, no scleral icterus  HENT: Normocephalic without oral lesions  Neck: Trachea midline without LAD  Cardiac: Normal pulse rate and rhythm  Pulmonary: Symmetric chest rise with normal effort  GI: Soft, NT, ND, no hernia, no splenomegaly  Skin: Warm without rash  Extremities: No edema or joint stiffness   RIGHT thigh incision healing well.    Psych: Appropriate mood and affect    Labs: No results found for this or any previous visit (from the past 24 hour(s)). Data Review: All previous imaging, testing and lab work was reviewed and interpreted. FINAL PATHOLOGIC DIAGNOSIS 03/12/21:   Right thigh cyst, excision:   Epidermal inclusion cyst    Assessment and Plan:       ICD-10-CM ICD-9-CM    1. Postoperative examination  Z09 V67.00        Incision healing well. Final PATH consistent with epidermal inclusion cyst. F/U PRN. All questions were answered and pt is in agreement with this plan. Total face to face time with patient: 5 minutes. Greater than 50% of the time was spent in counseling. This document was scribed by Brenna Smart as dictated by Dr. Gary Dandy.      Signed By: Pastor Sabino MD     03/29/21

## 2021-04-24 ENCOUNTER — HOSPITAL ENCOUNTER (OUTPATIENT)
Dept: CT IMAGING | Age: 61
Discharge: HOME | End: 2021-04-24
Attending: PHYSICIAN ASSISTANT
Payer: COMMERCIAL

## 2021-04-24 LAB — CREAT BLD-MCNC: 1 MG/DL (ref 0.6–1.3)

## 2021-04-24 PROCEDURE — 74011000636 HC RX REV CODE- 636: Performed by: PHYSICIAN ASSISTANT

## 2021-04-24 PROCEDURE — 82565 ASSAY OF CREATININE: CPT

## 2021-04-24 PROCEDURE — 74170 CT ABD WO CNTRST FLWD CNTRST: CPT

## 2021-04-24 PROCEDURE — 71260 CT THORAX DX C+: CPT

## 2021-04-24 RX ADMIN — IOPAMIDOL 100 ML: 755 INJECTION, SOLUTION INTRAVENOUS at 10:09

## 2021-05-03 ENCOUNTER — OFFICE VISIT (OUTPATIENT)
Dept: HEMATOLOGY | Age: 61
End: 2021-05-03
Payer: COMMERCIAL

## 2021-05-03 VITALS
SYSTOLIC BLOOD PRESSURE: 172 MMHG | HEIGHT: 66 IN | OXYGEN SATURATION: 96 % | WEIGHT: 146 LBS | RESPIRATION RATE: 17 BRPM | BODY MASS INDEX: 23.46 KG/M2 | HEART RATE: 63 BPM | DIASTOLIC BLOOD PRESSURE: 91 MMHG | TEMPERATURE: 96.9 F

## 2021-05-03 DIAGNOSIS — B18.1 CHRONIC HEPATITIS B (HCC): ICD-10-CM

## 2021-05-03 DIAGNOSIS — C22.0 HEPATOCELLULAR CARCINOMA (HCC): Primary | ICD-10-CM

## 2021-05-03 PROCEDURE — 99214 OFFICE O/P EST MOD 30 MIN: CPT | Performed by: PHYSICIAN ASSISTANT

## 2021-05-03 RX ORDER — TENOFOVIR DISOPROXIL FUMARATE 300 MG/1
TABLET, FILM COATED ORAL
Qty: 30 TAB | Refills: 11 | Status: SHIPPED | OUTPATIENT
Start: 2021-05-03 | End: 2021-11-10 | Stop reason: SDUPTHER

## 2021-05-03 NOTE — PROGRESS NOTES
Identified pt with two pt identifiers(name and ). Reviewed record in preparation for visit and have obtained necessary documentation. Chief Complaint   Patient presents with    Hepatitis B     f/u    Other     liver mass      Vitals:    21 1303   BP: (!) 172/91   Pulse: 63   Resp: 17   Temp: 96.9 °F (36.1 °C)   TempSrc: Temporal   SpO2: 96%   Weight: 146 lb (66.2 kg)   Height: 5' 6\" (1.676 m)   PainSc:   0 - No pain       Health Maintenance Review: Patient reminded of \"due or due soon\" health maintenance. I have asked the patient to contact his/her primary care provider (PCP) for follow-up on his/her health maintenance. Coordination of Care Questionnaire:  :   1) Have you been to an emergency room, urgent care, or hospitalized since your last visit? If yes, where when, and reason for visit? no       2. Have seen or consulted any other health care provider since your last visit? If yes, where when, and reason for visit? NO      Patient is accompanied by son I have received verbal consent from China Pacheco to discuss any/all medical information while they are present in the room.

## 2021-05-03 NOTE — PROGRESS NOTES
Santiago Rosas MD, Frankie Dance, MD Tashi Orellana, MORALES Freire Ma, [facility]     Queta Alva, [facility]   Samira Sr P-C    Yanique Snyder, [facility]       Noel Deputado Roscoe De Shields 136    at [address], [address] [place] [address] 22.    [phone]    FAX: [address] [place] [address], [address]    [phone]    FAX: [phone]     Patient Care Team:  Sae Grajeda MD as PCP - General (Family Medicine)  Sae Grajeda MD as PCP - [facility] Provider  Ramirez Mckoy MD (Cardiology)  Yeny Haas MD (General Surgery)  Roger Justin, DUTCH as Nurse Navigator      Patient Active Problem List   Diagnosis Code    Chronic hepatitis B ([address] B18.1    Liver mass, right lobe R16.0    Pulmonary nodule R91.1    Hepatocellular carcinoma ([address] C22.0    Liver hematoma, infected S36.112A    H/O resection of liver Z90.49    Mass of soft tissue of right lower extremity M79.89       Darryle Prude returns to the [address] for management of [address] and chronic HBV. The active problem list, all pertinent past medical history, medications, radiologic findings and laboratory findings related to the liver disorder were reviewed with the patient. The patient is a 57 y.o.  male from [place] who was first noted to have abnormalities in liver transaminases in 3/2016. Serologic studies demonstrated he was positive for HBV and ASMA. Assessment of liver fibrosis with Fibroscan and surgical resection suggested stage 3 bridging fibrosis. He was started on Viread in 6/2016.   The last HBV DNA was undetectable and he has continued to tolerate this medication well and has had no issues with ongoing access to this medication. He developed [address] in segment 8, right lobe in 11/2016. This was treated with surgical resection in 1/2017. He has undergone regular surveillance for [address] following surgical resection. A CT scan in 2/2018 demonstrated a new 1.1 cm lesion in segment 7, right lobe not adjacent to the previous resection site. We had presented him with treatment options for recurrent HCC, he elected at that time to proceed with RFA. He was not open to transplant at that time, but relates that he might consider this as an option in the future if needed. Patient underwent uneventful RFA of the segment 7 lesion on 3/30/2018. He remained without recurrent lesion until imaging with CT in 9/2019 showed a new 3 cm left hepatic hypervascular mass, concerning for hepatoma. He has had Y-90 TARE done on 10/14/2019 and tolerated this procedure well. He is doing well now without abdominal pain, nausea or change in activity levels. Repeat imaging in 1/2020, 5/2020, 7/2020, 10/2020 and 4/2021 have not shown recurrent or new lesions of the chest or abdomen. He has been trying to cut back on cigarette use, but is still smoking about a 1/2 ppd and drinking a few beers \"every once in a while. \"    The patient notes persistent pain in the right side over the liver since the liver resection, this has not gotten any worse and he endorses has lessened over time, this is now not an issue. The patient has not experienced fatigue. The patient completes all daily activities without any functional limitations. He continues on BP medication and monitors this value at home on a routine basis. He reports that this is not generally elevated in the morning when he checks, he has not had headache or visual changes associated with the blood pressure. Since his last office visit, he has had removal of a cyst/lipoma of the thigh in 3/2021 that had been irritated.   This was benign and he has made full recovery. ALLERGIES  Allergies   Allergen Reactions    Venom-Honey Bee Anaphylaxis       MEDICATIONS  Current Outpatient Medications   Medication Sig    tenofovir DISOPROXIL FUMARATE (VIREAD) 300 mg tablet TAKE 1 TABLET BY MOUTH ONCE DAILY    lisinopriL (PRINIVIL, ZESTRIL) 40 mg tablet Take 1 Tab by mouth daily. No current facility-administered medications for this visit. SYSTEM REVIEW NOT RELATED TO LIVER DISEASE OR REVIEWED ABOVE:  Constitution systems: Negative for fever, chills, weight gain. Weight loss of ~5#. Eyes: Negative for visual changes. ENT: Negative for sore throat, painful swallowing. Respiratory: Negative for cough, hemoptysis, SOB. Cardiology: Negative for chest pain, palpitations. GI:  Negative for constipation or diarrhea. Some achy/pulling pain inferior to incision, persists - but to a much less extent. : Negative for urinary frequency, dysuria, hematuria, nocturia. Skin: Negative for rash. Diminished sensitivity of the skin overlying the right anterior thigh since surgery. Hematology: Negative for easy bruising, blood clots. Musculo-skeletal: Negative for back pain, muscle pain, weakness. Neurologic: Negative for headaches, dizziness, vertigo, memory problems not related to HE. Psychology: Negative for anxiety, depression. FAMILY HISTORY:  The father  of lung cancer. The patient has no knowledge of the mother's medical condition. There is no family history of liver disease. SOCIAL HISTORY:  The patient is . The patient has 5 children, and 7 grandchildren. The patient currently smokes ~1/2 pack of tobacco daily. He is trying to cut back on cigarettes in general.  The patient has previously consumed alcohol in excess. He now consumes alcoholic beverages on weekends, 1-2 times a month. This represents a significant reduction for him. The patient currently works full time as a . PHYSICAL EXAMINATION:  Visit Vitals  BP (!) 172/91 (BP 1 Location: Right upper arm, BP Patient Position: Sitting, BP Cuff Size: Adult)   Pulse 63   Temp 96.9 °F (36.1 °C) (Temporal)   Resp 17   Ht 5' 6\" (1.676 m)   Wt 146 lb (66.2 kg)   SpO2 96%   BMI 23.57 kg/m²     General: No acute distress. Eyes: Sclera anicteric. ENT: No oral lesions. Thyroid normal.  Nodes: No adenopathy. Skin: No spider angiomata. No jaundice. No palmar erythema. Respiratory: Lungs clear to auscultation. Cardiovascular: Regular heart rate. No murmurs. No JVD. Abdomen: 9-10 cm RUQ incisional scar, well-approximated without keloid or hernia. No surrounding erythema. Tender with palpation along the inferior border of the liver, which is prominent. Small, reducible umbilical hernia. Spleen not palpable. No obvious ascites. Extremities: No edema. No muscle wasting. No gross arthritic changes. Positive for significant varicose veins on the LLE. Neurologic: Alert and oriented. Cranial nerves grossly intact. No asterixis.     LABORATORY STUDIES:  [facility] [place] [facility] [address] [place] [address] Units 12/16/2020 11/2/2020   WBC 3.4 - 10.8 x10E3/uL 5.8 7.8   ANC 1.8 - 8.0 K/UL     HGB 13.0 - 17.7 g/dL 15.4 15.1    - 450 x10E3/uL 257 244   INR  CANCELED 1.0   AST 0 - 40 IU/L 33 39   ALT 0 - 44 IU/L 17 17   Alk Phos 39 - 117 IU/L 81 82   Bili, Total 0.0 - 1.2 mg/dL 0.2 0.4   Bili, Direct 0.00 - 0.40 mg/dL 0.10 0.14   Albumin 3.8 - 4.8 g/dL 4.8 4.5   BUN 8 - 27 mg/dL 10 17   Creat 0.76 - 1.27 mg/dL 0.93 1.43 (H)   Creat (iSTAT) 0.6 - 1.3 mg/dL     Na 134 - 144 mmol/L 136 135   K 3.5 - 5.2 mmol/L 4.2 4.9   Cl 96 - 106 mmol/L 99 96   CO2 20 - 29 mmol/L 23 22   Glucose 65 - 99 mg/dL 98 92     Liver [place] [facility] [place] Latest Ref Rng & Units 1/30/2020   WBC 3.4 - 10.8 x10E3/uL 6.8   ANC 1.8 - 8.0 K/UL    HGB 13.0 - 17.7 g/dL 15.5    - 450 x10E3/uL 230   INR  CANCELED   AST 0 - 40 IU/L 30   ALT 0 - 44 IU/L 19   Alk Phos 39 - 117 IU/L 102   Bili, Total 0.0 - 1.2 mg/dL <0.2   Bili, Direct 0.00 - 0.40 mg/dL 0.05   Albumin 3.8 - 4.8 g/dL 4.5   BUN 8 - 27 mg/dL 15   Creat 0.76 - 1.27 mg/dL 1.13   Creat (iSTAT) 0.6 - 1.3 mg/dL    Na 134 - 144 mmol/L 135   K 3.5 - 5.2 mmol/L 4.4   Cl 96 - 106 mmol/L 97   CO2 20 - 29 mmol/L 21   Glucose 65 - 99 mg/dL 94     Cancer Screening Latest Ref Rng & Units 12/16/2020 11/2/2020 1/30/2020   AFP, Serum 0.0 - 8.0 ng/mL 2.4 2.5 5.9   AFP-L3% 0.0 - 9.9 % Comment Comment Comment     Virology Latest Ref Rng & Units 12/16/2020 11/2/2020   HBV DNA IU/mL CANCELED HBV DNA not detected     Virology Latest Ref Rng & Units 1/30/2020   HBV DNA IU/mL HBV DNA not detected   Additional lab values drawn at today's office visit are pending at the time of documentation. SEROLOGIES:  Serologies Latest Ref Rng 6/10/2016 4/22/2016   Hep A Ab, Total Negative  Positive (A)   Hep B Surface Ag Negative  Positive (A)   Hep B Core Ab, Total Negative  Positive (A)   Hep B Surface AB QL   Non Reactive   Hep C Ab 0.0 - 0.9 s/co ratio  0.1   Hep D AB Not Detected Not Detected    Ferritin 30 - 400 ng/mL  359   Iron % Saturation 15 - 55 %  76 (HH)   AKIN, IFA   Negative   ASMCA 0 - 19 Units  83 (H)   Alpha-1 antitrypsin level 90 - 200 mg/dL  176   6/2016. HIV Ab negative. HBeAg negative, HBeAb positive. LIVER HISTOLOGY:  6/2016. Fib[facility]Scan [facility] at [facility]. EkPa was 11.8. IQR/med 26%. The results suggested a fibrosis level of F3.  1/2017. Liver resection. 2.2 cm HCC, moderately differentiated with free margins. Adjacent liver shows chronic hepatitis with bridging fibrosis (grade 2, stage 3)    ENDOSCOPIC PROCEDURES:  Not available or performed    RADIOLOGY:  3/2016. Ultrasound of liver. Normal appearing liver. Single hyperecholic mass in the right lobe measuring 1.5 cm. Consistent with hemangioma. 5/2016.  CT scan abdomen with and without IV contrast. 1.9 cm hemangioma in the right hepatic lobe. No additional liver lesion. 11/2016. Ultrasound of liver. Increase in size of the right lobe hepatic mass. CT scan or MRI recommended. 11/2016. CT scan abdomen. Interval enlargement of hypervascular segment 8 liverlesion now 2.5 x 1.8 x 3.1 cm. In the setting of hepatitis B, despite lack of CT evidence of cirrhosis, hepatocellular carcinoma is more likely than an unusual presentation of a benign lesion such as atypical hemangioma. 3/2017. CT of abdomen. No residual enhancing tumor  Gas within the right lobe resection site is unexpected 2 months postoperatively. Surgical packing material, abscess, and less likely retained postoperative gas, are possible. 4/2017. CT abdomen. No CT evidence of locally recurrent hepatocellular carcinoma, metastatic disease, or new lesions within cirrhotic appearing liver. Resolution of fluid collection. 2/2018. CT scan abdomen with and without IV contrast.  Changes consistent with chronic liver disease. New 1.1 cm enhancing liver mass with washout and rim enhancement in segment 7, right lobe. No dilated bile ducts. No ascites. 8/2018. CT abdomen. Wide ablation zone around the segment 6-7 junction HCC. No new hepatic masses. Early arterial phase limits sensitivity for small hypervascular masses. 2/2019. CT abdomen and chest.  No sign of pulmonary nodule concern. Low-attenuation ablation defect in the periphery of the right lobe of liver in segment 7 slightly increased to equivocal in size. There is no evidence of recurrent or new disease within the liver. 9/2019. CT abdomen and chest. New left hepatic hypervascular mass, 2.5 x 2.9 cm. concerning for hepatoma. Stable right middle lobe pulmonary micronodules, stable as compared to the 2016 study. 1/2020. CT abdomen. No new or recurrent liver lesions. Tiny renal hypodensities, likely cysts. 5/2020. CT abdomen/chest.  No viable tumor, no metastases. 7/2020.   CT abdomen/chest.  No evidence of active/viable tumor, posttreatment changes noted. no metastases in the chest.   10/2020. CT abdomen/chest.  No new or recurrent liver lesion, no pulmonary nodules. 2021. CT abdomen/chest. Stable sequelae of treated lesions in segment 7 and segments 2/3 of the liver. No evidence for any residual or recurrent neoplasm. No new liver abnormality. No evidence for any metastatic disease in the chest    OTHER TESTIN2017. Surgical resection of [address] segment 8.  3/2018. RFA treatment to segment 7 lesion. 10/2019. Y-90 TARE. ASSESSMENT AND PLAN:  Chronic HBV with bridging fibrosis. Liver function is normal.  The platelet count is normal.    Chronic E-antigen negative HBV with undetectable HBV DNA on tenofovir. He continues to tolerate this well. He understands that this will be a life-long medication pending loss of Hep B Surface Ag. [address] treated with surgical resection in 2017. Recurrence of [address] in 2018. The patient had elected to treat with RFA in 3/2018. Recurrence of [address] in 2019. The patient has had TARE Y-90 theraspheres in 10/2019. No new or recurrent lesions on 2021 imaging. I have outlined with the patient and his son that he will continue to need imaging by CT scan every 6 months through 5 years. This will be scheduled prior to his next office visit. Will obtain labs today to verify stable liver function and to assess AFP and HBV suppression. I have reviewed with the patient options for treatment/transplant if recurrence were to occur. Patient would entertain transplant in the future. He is making an attempt to quit cigarettes and has largely eliminated alcohol. We could also consider use of oral anti-tumor agents as needed - he has elected to defer. Pulmonary nodules on chest CT have not changed since 2016 and none described since imaging with CT in 2021 of the chest.  Will continue to monitor. I have again stressed the importance of smoking cessation. Hypertension. Patient is presently on 40 mg lisinopril through PCP for management. He tolerates this well and home monitoring has been within normal ranges. He had a cigarette just prior to coming in for his appointment. He plans on reviewing his recordings with his PCP in the near future as well. The patient was directed to continue all current medications at the current dosages. There are no contraindications for the patient to take any medications that are necessary for treatment of other medical issues. The patient was advised to remain abstinent from alcohol. He is continuing to drink alcohol on rare occasions. Vaccination for viral hepatitis A is not needed. The patient has serologic evidence of prior exposure or vaccination with immunity. All of the above issues were discussed with the patient. All questions were answered. The patient expressed a clear understanding of the above. [facility] 87 in 6 months with CT of the abdomen/chest prior to the next appointment.      Christina Keita PA-C  Liver [facility] 59, [facility] 22.  [phone]  [address] [place] [address]

## 2021-05-04 LAB
ALBUMIN SERPL-MCNC: 4.5 G/DL (ref 3.8–4.8)
ALP SERPL-CCNC: 84 IU/L (ref 39–117)
ALT SERPL-CCNC: 22 IU/L (ref 0–44)
AST SERPL-CCNC: 48 IU/L (ref 0–40)
BILIRUB DIRECT SERPL-MCNC: 0.11 MG/DL (ref 0–0.4)
BILIRUB SERPL-MCNC: 0.3 MG/DL (ref 0–1.2)
BUN SERPL-MCNC: 9 MG/DL (ref 8–27)
BUN/CREAT SERPL: 9 (ref 10–24)
CALCIUM SERPL-MCNC: 8.8 MG/DL (ref 8.6–10.2)
CHLORIDE SERPL-SCNC: 91 MMOL/L (ref 96–106)
CO2 SERPL-SCNC: 23 MMOL/L (ref 20–29)
CREAT SERPL-MCNC: 0.99 MG/DL (ref 0.76–1.27)
ERYTHROCYTE [DISTWIDTH] IN BLOOD BY AUTOMATED COUNT: 12.9 % (ref 11.6–15.4)
GLUCOSE SERPL-MCNC: 99 MG/DL (ref 65–99)
HBV DNA SERPL NAA+PROBE-ACNC: NORMAL IU/ML
HBV DNA SERPL NAA+PROBE-LOG IU: NORMAL LOG10 IU/ML
HCT VFR BLD AUTO: 42.5 % (ref 37.5–51)
HGB BLD-MCNC: 15.3 G/DL (ref 13–17.7)
MCH RBC QN AUTO: 34.7 PG (ref 26.6–33)
MCHC RBC AUTO-ENTMCNC: 36 G/DL (ref 31.5–35.7)
MCV RBC AUTO: 96 FL (ref 79–97)
PLATELET # BLD AUTO: 244 X10E3/UL (ref 150–450)
POTASSIUM SERPL-SCNC: 4.2 MMOL/L (ref 3.5–5.2)
PROT SERPL-MCNC: 7 G/DL (ref 6–8.5)
RBC # BLD AUTO: 4.41 X10E6/UL (ref 4.14–5.8)
REF LAB TEST REF RANGE: NORMAL
SODIUM SERPL-SCNC: 128 MMOL/L (ref 134–144)
WBC # BLD AUTO: 6.8 X10E3/UL (ref 3.4–10.8)

## 2021-05-05 LAB
AFP L3 MFR SERPL: NORMAL % (ref 0–9.9)
AFP SERPL-MCNC: 2.5 NG/ML (ref 0–8)

## 2021-05-05 NOTE — PROGRESS NOTES
Pt son notified of stable findings, remain on present dosing of medication and follow-up in 6 months with repeat imaging.

## 2021-06-29 ENCOUNTER — OFFICE VISIT (OUTPATIENT)
Dept: FAMILY MEDICINE CLINIC | Age: 61
End: 2021-06-29
Payer: COMMERCIAL

## 2021-06-29 VITALS
HEIGHT: 66 IN | OXYGEN SATURATION: 97 % | BODY MASS INDEX: 23.78 KG/M2 | DIASTOLIC BLOOD PRESSURE: 80 MMHG | SYSTOLIC BLOOD PRESSURE: 180 MMHG | TEMPERATURE: 98.2 F | WEIGHT: 148 LBS | HEART RATE: 64 BPM | RESPIRATION RATE: 12 BRPM

## 2021-06-29 DIAGNOSIS — I10 ESSENTIAL HYPERTENSION: Primary | ICD-10-CM

## 2021-06-29 PROCEDURE — 99213 OFFICE O/P EST LOW 20 MIN: CPT | Performed by: FAMILY MEDICINE

## 2021-06-29 RX ORDER — AMLODIPINE BESYLATE 10 MG/1
10 TABLET ORAL DAILY
Qty: 30 TABLET | Refills: 0 | Status: SHIPPED | OUTPATIENT
Start: 2021-06-29 | End: 2021-07-27 | Stop reason: SDUPTHER

## 2021-06-29 NOTE — ACP (ADVANCE CARE PLANNING)
Non-Provider Advance Care Planning (ACP) Note    Date of ACP Conversation: 6/29/2021  Persons included in Conversation: patient  Length of ACP Conversation in minutes: <16 minutes (Non-Billable)    Conversation requested by:   Provider    Authorized Decision Maker (if patient is incapable of making informed decisions):    This person is:  Next of Kin by law (only applies in absence of a Healthcare Power of  or Legal Guardian)        General ACP for ALL Patients with Decision Making Capacity:    Advance Directive Conversation with Patients who have not yet planned:  Importance of advance care planning, including choosing a healthcare agent to communicate patient's healthcare decisions if patient lost the ability to make decisions, such as after a sudden illness or accident    Review of Existing Advance Directive: (Select questions covered)  na    Interventions Provided:  Provided ACP educational materials:  Conversation Starter Kit  Advance Directive Form

## 2021-06-29 NOTE — PROGRESS NOTES
1. Have you been to the ER, urgent care clinic since your last visit? Hospitalized since your last visit? No    2. Have you seen or consulted any other health care providers outside of the [address] [place] [address] since your last visit? Include any pap smears or colon screening.  No

## 2021-06-29 NOTE — PROGRESS NOTES
Adrian Bourgeois is a 57 y.o. 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 Past Medical History:   Diagnosis Date    Arthritis     Hepatitis B carrier ([facility]     Hepatoma ([facility] 1/6/2017    Hypertension     PT DENIES    Liver disease     HEPATITIS B    Liver mass, right lobe 4/22/2016    Mass of soft tissue of right lower extremity 2/24/2021    Pulmonary nodule 06/2016    right middle lobe, recheck 11/16 stable at 2 mm, recheck again in 6 mo    Ulnar nerve compression     right       Past Surgical History:   Procedure Laterality Date     [address] A.    HX LAP CHOLECYSTECTOMY      HX LIPOMA RESECTION  03/2021    HX ORTHOPAEDIC Right 2014    ELBOW    HX OTHER SURGICAL  01/06/2017    partial right [id number]. Miranda Lasso    IR OCCL TXCATH ORGAN W SI  10/14/2019       Allergies   Allergen Reactions    Venom-Honey Bee Anaphylaxis    Lisinopril Cough       Current Outpatient Medications   Medication Sig    amLODIPine (NORVASC) 10 mg tablet Take 1 Tablet by mouth daily.  tenofovir DISOPROXIL FUMARATE (VIREAD) 300 mg tablet TAKE 1 TABLET BY MOUTH ONCE DAILY     No current facility-administered medications for this visit.        Social History     Socioeconomic History    Marital status:      Spouse name: Not on file    Number of children: Not on file    Years of education: Not on file    Highest education level: Not on file   Tobacco Use    Smoking status: Current Every Day Smoker     Packs/day: 1.00     Years: 30.00     Pack years: 30.00     Types: Cigarettes    Smokeless tobacco: Never Used   Vaping Use    Vaping Use: Never used   Substance and Sexual Activity    Alcohol use: No     Alcohol/week: 0.0 standard drinks     Comment: quit 11/2016    Drug use: No    Sexual activity: Yes     Partners: Female     Social Determinants of Health     Financial Resource Strain:     Difficulty of Paying Living Expenses:    Food Insecurity:     Worried About Running Out of Food in the Last Year:     [address] in the Last Year:    Transportation Needs:     Lack of Transportation (Medical):  Lack of Transportation (Non-Medical):    Physical Activity:     Days of Exercise per Week:     Minutes of Exercise per Session:    Stress:     Feeling of Stress :    Social Connections:     Frequency of Communication with Friends and Family:     Frequency of Social Gatherings with Friends and Family:     Attends Christian Services:     Active Member of Clubs or Organizations:     Attends Club or Organization Meetings:     Marital Status:        Family History   Problem Relation Age of Onset    No Known Problems Mother     No Known Problems Father     No Known Problems Sister     No Known Problems Sister     Anesth Problems Neg Hx          Physical Exam:  Visit Vitals  BP (!) 180/80   Pulse 64   Temp 98.2 °F (36.8 °C)   Resp 12   Ht 5' 6\" (1.676 m)   Wt 148 lb (67.1 kg)   SpO2 97%   BMI 23.89 kg/m²     Physical Exam  Vitals reviewed. Constitutional:       Appearance: He is obese. HENT:      Head: Normocephalic and atraumatic. Right Ear: Tympanic membrane, ear canal and external ear normal.      Left Ear: Tympanic membrane, ear canal and external ear normal.   Eyes:      Extraocular Movements: Extraocular movements intact. Conjunctiva/sclera: Conjunctivae normal.   Cardiovascular:      Rate and Rhythm: Normal rate and regular rhythm.       Pulses: Normal pulses. Heart sounds: Normal heart sounds. Comments: Varicose veins  Pulmonary:      Effort: Pulmonary effort is normal.      Breath sounds: Normal breath sounds. Abdominal:      General: Abdomen is flat. There is no distension. Palpations: Abdomen is soft. Tenderness: There is no abdominal tenderness. There is no left CVA tenderness or guarding. Musculoskeletal:      Right lower leg: No edema. Left lower leg: No edema. Lymphadenopathy:      Cervical: No cervical adenopathy. Skin:     General: Skin is warm and dry. Neurological:      Mental Status: He is alert and oriented to person, place, and time. Psychiatric:         Mood and Affect: Mood normal.         Behavior: Behavior normal.         Assessment/Plan:    ICD-10-CM ICD-9-CM    1. Essential hypertension  I10 401.9 amLODIPine (NORVASC) 10 mg tablet   Amlodipine started  SE discussed  stop Lisinopril      Follow-up and Dispositions    · Return in about 3 weeks (around 7/20/2021).          Pipe Taveras MD

## 2021-07-02 ENCOUNTER — APPOINTMENT (OUTPATIENT)
Dept: GENERAL RADIOLOGY | Age: 61
End: 2021-07-02
Attending: EMERGENCY MEDICINE
Payer: COMMERCIAL

## 2021-07-02 ENCOUNTER — APPOINTMENT (OUTPATIENT)
Dept: CT IMAGING | Age: 61
End: 2021-07-02
Attending: EMERGENCY MEDICINE
Payer: COMMERCIAL

## 2021-07-02 ENCOUNTER — HOSPITAL ENCOUNTER (OUTPATIENT)
Age: 61
Setting detail: OBSERVATION
Discharge: HOME | End: 2021-07-03
Attending: EMERGENCY MEDICINE | Admitting: INTERNAL MEDICINE
Payer: COMMERCIAL

## 2021-07-02 ENCOUNTER — HOSPITAL ENCOUNTER (EMERGENCY)
Dept: MRI IMAGING | Age: 61
Discharge: HOME | End: 2021-07-02
Attending: EMERGENCY MEDICINE
Payer: COMMERCIAL

## 2021-07-02 DIAGNOSIS — R41.82 ALTERED MENTAL STATUS, UNSPECIFIED ALTERED MENTAL STATUS TYPE: Primary | ICD-10-CM

## 2021-07-02 PROBLEM — S36.112A LIVER HEMATOMA: Status: RESOLVED | Noted: 2017-03-16 | Resolved: 2021-07-02

## 2021-07-02 PROBLEM — M79.89 MASS OF SOFT TISSUE OF RIGHT LOWER EXTREMITY: Status: RESOLVED | Noted: 2021-02-24 | Resolved: 2021-07-02

## 2021-07-02 PROBLEM — Z90.49 H/O RESECTION OF LIVER: Status: RESOLVED | Noted: 2018-02-16 | Resolved: 2021-07-02

## 2021-07-02 PROBLEM — G93.41 ACUTE METABOLIC ENCEPHALOPATHY: Status: ACTIVE | Noted: 2021-07-02

## 2021-07-02 LAB
ALBUMIN SERPL-MCNC: 4.2 G/DL (ref 3.5–5)
ALBUMIN/GLOB SERPL: 1.1 {RATIO} (ref 1.1–2.2)
ALP SERPL-CCNC: 83 U/L (ref 45–117)
ALT SERPL-CCNC: 42 U/L (ref 12–78)
AMPHET UR QL SCN: NEGATIVE
ANION GAP SERPL CALC-SCNC: 6 MMOL/L (ref 5–15)
APPEARANCE UR: CLEAR
AST SERPL-CCNC: 56 U/L (ref 15–37)
BACTERIA URNS QL MICRO: NEGATIVE /HPF
BARBITURATES UR QL SCN: NEGATIVE
BASOPHILS # BLD: 0.1 K/UL (ref 0–0.1)
BASOPHILS NFR BLD: 1 % (ref 0–1)
BENZODIAZ UR QL: NEGATIVE
BILIRUB SERPL-MCNC: 0.7 MG/DL (ref 0.2–1)
BILIRUB UR QL: NEGATIVE
BUN SERPL-MCNC: 10 MG/DL (ref 6–20)
BUN/CREAT SERPL: 11 (ref 12–20)
CALCIUM SERPL-MCNC: 8.7 MG/DL (ref 8.5–10.1)
CANNABINOIDS UR QL SCN: NEGATIVE
CHLORIDE SERPL-SCNC: 97 MMOL/L (ref 97–108)
CO2 SERPL-SCNC: 28 MMOL/L (ref 21–32)
COCAINE UR QL SCN: NEGATIVE
COLOR UR: NORMAL
COMMENT, HOLDF: NORMAL
CREAT SERPL-MCNC: 0.93 MG/DL (ref 0.7–1.3)
DIFFERENTIAL METHOD BLD: ABNORMAL
DRUG SCRN COMMENT,DRGCM: NORMAL
EOSINOPHIL # BLD: 0.1 K/UL (ref 0–0.4)
EOSINOPHIL NFR BLD: 1 % (ref 0–7)
EPITH CASTS URNS QL MICRO: NORMAL /LPF
ERYTHROCYTE [DISTWIDTH] IN BLOOD BY AUTOMATED COUNT: 12.8 % (ref 11.5–14.5)
ERYTHROCYTE [DISTWIDTH] IN BLOOD BY AUTOMATED COUNT: 12.8 % (ref 11.5–14.5)
GLOBULIN SER CALC-MCNC: 3.9 G/DL (ref 2–4)
GLUCOSE BLD STRIP.AUTO-MCNC: 125 MG/DL (ref 65–117)
GLUCOSE SERPL-MCNC: 81 MG/DL (ref 65–100)
GLUCOSE UR STRIP.AUTO-MCNC: NEGATIVE MG/DL
HCT VFR BLD AUTO: 44.8 % (ref 36.6–50.3)
HCT VFR BLD AUTO: 46.1 % (ref 36.6–50.3)
HGB BLD-MCNC: 16 G/DL (ref 12.1–17)
HGB BLD-MCNC: 16.6 G/DL (ref 12.1–17)
HGB UR QL STRIP: NEGATIVE
IMM GRANULOCYTES # BLD AUTO: 0 K/UL (ref 0–0.04)
IMM GRANULOCYTES NFR BLD AUTO: 0 % (ref 0–0.5)
INR PPP: 1 (ref 0.9–1.1)
KETONES UR QL STRIP.AUTO: NEGATIVE MG/DL
LEUKOCYTE ESTERASE UR QL STRIP.AUTO: NEGATIVE
LYMPHOCYTES # BLD: 1.6 K/UL (ref 0.8–3.5)
LYMPHOCYTES NFR BLD: 21 % (ref 12–49)
MCH RBC QN AUTO: 34.3 PG (ref 26–34)
MCH RBC QN AUTO: 34.3 PG (ref 26–34)
MCHC RBC AUTO-ENTMCNC: 35.7 G/DL (ref 30–36.5)
MCHC RBC AUTO-ENTMCNC: 36 G/DL (ref 30–36.5)
MCV RBC AUTO: 95.2 FL (ref 80–99)
MCV RBC AUTO: 95.9 FL (ref 80–99)
METHADONE UR QL: NEGATIVE
MONOCYTES # BLD: 0.9 K/UL (ref 0–1)
MONOCYTES NFR BLD: 12 % (ref 5–13)
NEUTS SEG # BLD: 5 K/UL (ref 1.8–8)
NEUTS SEG NFR BLD: 65 % (ref 32–75)
NITRITE UR QL STRIP.AUTO: NEGATIVE
NRBC # BLD: 0 K/UL (ref 0–0.01)
NRBC # BLD: 0 K/UL (ref 0–0.01)
NRBC BLD-RTO: 0 PER 100 WBC
NRBC BLD-RTO: 0 PER 100 WBC
OPIATES UR QL: NEGATIVE
PCP UR QL: NEGATIVE
PH UR STRIP: 7 [PH] (ref 5–8)
PLATELET # BLD AUTO: 228 K/UL (ref 150–400)
PLATELET # BLD AUTO: 245 K/UL (ref 150–400)
PMV BLD AUTO: 9.2 FL (ref 8.9–12.9)
PMV BLD AUTO: 9.3 FL (ref 8.9–12.9)
POTASSIUM SERPL-SCNC: 3.3 MMOL/L (ref 3.5–5.1)
PROT SERPL-MCNC: 8.1 G/DL (ref 6.4–8.2)
PROT UR STRIP-MCNC: NEGATIVE MG/DL
PROTHROMBIN TIME: 10.7 SEC (ref 9–11.1)
RBC # BLD AUTO: 4.67 M/UL (ref 4.1–5.7)
RBC # BLD AUTO: 4.84 M/UL (ref 4.1–5.7)
RBC #/AREA URNS HPF: NORMAL /HPF (ref 0–5)
SAMPLES BEING HELD,HOLD: NORMAL
SERVICE CMNT-IMP: ABNORMAL
SODIUM SERPL-SCNC: 131 MMOL/L (ref 136–145)
SP GR UR REFRACTOMETRY: <1.005 (ref 1–1.03)
TROPONIN I SERPL-MCNC: <0.05 NG/ML
UROBILINOGEN UR QL STRIP.AUTO: 0.2 EU/DL (ref 0.2–1)
WBC # BLD AUTO: 5.8 K/UL (ref 4.1–11.1)
WBC # BLD AUTO: 7.6 K/UL (ref 4.1–11.1)
WBC URNS QL MICRO: NORMAL /HPF (ref 0–4)

## 2021-07-02 PROCEDURE — 84443 ASSAY THYROID STIM HORMONE: CPT

## 2021-07-02 PROCEDURE — 82962 GLUCOSE BLOOD TEST: CPT

## 2021-07-02 PROCEDURE — 80320 DRUG SCREEN QUANTALCOHOLS: CPT

## 2021-07-02 PROCEDURE — 80053 COMPREHEN METABOLIC PANEL: CPT

## 2021-07-02 PROCEDURE — 82140 ASSAY OF AMMONIA: CPT

## 2021-07-02 PROCEDURE — 93005 ELECTROCARDIOGRAM TRACING: CPT

## 2021-07-02 PROCEDURE — 70551 MRI BRAIN STEM W/O DYE: CPT

## 2021-07-02 PROCEDURE — 36415 COLL VENOUS BLD VENIPUNCTURE: CPT

## 2021-07-02 PROCEDURE — 74011250637 HC RX REV CODE- 250/637: Performed by: INTERNAL MEDICINE

## 2021-07-02 PROCEDURE — 99218 HC RM OBSERVATION: CPT

## 2021-07-02 PROCEDURE — 85027 COMPLETE CBC AUTOMATED: CPT

## 2021-07-02 PROCEDURE — 85025 COMPLETE CBC W/AUTO DIFF WBC: CPT

## 2021-07-02 PROCEDURE — 80307 DRUG TEST PRSMV CHEM ANLYZR: CPT

## 2021-07-02 PROCEDURE — 99285 EMERGENCY DEPT VISIT HI MDM: CPT

## 2021-07-02 PROCEDURE — 71045 X-RAY EXAM CHEST 1 VIEW: CPT

## 2021-07-02 PROCEDURE — 82077 ASSAY SPEC XCP UR&BREATH IA: CPT

## 2021-07-02 PROCEDURE — 0042T CT CODE NEURO PERF W CBF: CPT

## 2021-07-02 PROCEDURE — 70498 CT ANGIOGRAPHY NECK: CPT

## 2021-07-02 PROCEDURE — 81001 URINALYSIS AUTO W/SCOPE: CPT

## 2021-07-02 PROCEDURE — 83735 ASSAY OF MAGNESIUM: CPT

## 2021-07-02 PROCEDURE — 74011000636 HC RX REV CODE- 636: Performed by: RADIOLOGY

## 2021-07-02 PROCEDURE — 84484 ASSAY OF TROPONIN QUANT: CPT

## 2021-07-02 PROCEDURE — 70450 CT HEAD/BRAIN W/O DYE: CPT

## 2021-07-02 PROCEDURE — 85610 PROTHROMBIN TIME: CPT

## 2021-07-02 RX ORDER — SODIUM CHLORIDE 0.9 % (FLUSH) 0.9 %
5-40 SYRINGE (ML) INJECTION AS NEEDED
Status: DISCONTINUED | OUTPATIENT
Start: 2021-07-02 | End: 2021-07-03 | Stop reason: HOSPADM

## 2021-07-02 RX ORDER — ONDANSETRON 4 MG/1
4 TABLET, ORALLY DISINTEGRATING ORAL
Status: DISCONTINUED | OUTPATIENT
Start: 2021-07-02 | End: 2021-07-03 | Stop reason: HOSPADM

## 2021-07-02 RX ORDER — AMLODIPINE BESYLATE 5 MG/1
10 TABLET ORAL DAILY
Status: DISCONTINUED | OUTPATIENT
Start: 2021-07-03 | End: 2021-07-03 | Stop reason: HOSPADM

## 2021-07-02 RX ORDER — ACETAMINOPHEN 650 MG/1
650 SUPPOSITORY RECTAL
Status: DISCONTINUED | OUTPATIENT
Start: 2021-07-02 | End: 2021-07-03 | Stop reason: HOSPADM

## 2021-07-02 RX ORDER — POLYETHYLENE GLYCOL 3350 17 G/17G
17 POWDER, FOR SOLUTION ORAL DAILY PRN
Status: DISCONTINUED | OUTPATIENT
Start: 2021-07-02 | End: 2021-07-03 | Stop reason: HOSPADM

## 2021-07-02 RX ORDER — TENOFOVIR DISOPROXIL FUMARATE 300 MG/1
300 TABLET, FILM COATED ORAL DAILY
Status: DISCONTINUED | OUTPATIENT
Start: 2021-07-03 | End: 2021-07-03 | Stop reason: HOSPADM

## 2021-07-02 RX ORDER — LORAZEPAM 2 MG/ML
2 INJECTION INTRAMUSCULAR
Status: DISCONTINUED | OUTPATIENT
Start: 2021-07-02 | End: 2021-07-03 | Stop reason: HOSPADM

## 2021-07-02 RX ORDER — HYDRALAZINE HYDROCHLORIDE 20 MG/ML
20 INJECTION INTRAMUSCULAR; INTRAVENOUS
Status: DISCONTINUED | OUTPATIENT
Start: 2021-07-02 | End: 2021-07-03 | Stop reason: HOSPADM

## 2021-07-02 RX ORDER — LORAZEPAM 2 MG/ML
1 INJECTION INTRAMUSCULAR
Status: DISCONTINUED | OUTPATIENT
Start: 2021-07-02 | End: 2021-07-03 | Stop reason: HOSPADM

## 2021-07-02 RX ORDER — ACETAMINOPHEN 325 MG/1
650 TABLET ORAL
Status: DISCONTINUED | OUTPATIENT
Start: 2021-07-02 | End: 2021-07-03 | Stop reason: HOSPADM

## 2021-07-02 RX ORDER — AMLODIPINE BESYLATE 5 MG/1
10 TABLET ORAL
Status: COMPLETED | OUTPATIENT
Start: 2021-07-02 | End: 2021-07-02

## 2021-07-02 RX ORDER — SODIUM CHLORIDE 0.9 % (FLUSH) 0.9 %
5-40 SYRINGE (ML) INJECTION EVERY 8 HOURS
Status: DISCONTINUED | OUTPATIENT
Start: 2021-07-02 | End: 2021-07-03 | Stop reason: HOSPADM

## 2021-07-02 RX ORDER — ENOXAPARIN SODIUM 100 MG/ML
40 INJECTION SUBCUTANEOUS DAILY
Status: DISCONTINUED | OUTPATIENT
Start: 2021-07-03 | End: 2021-07-03 | Stop reason: HOSPADM

## 2021-07-02 RX ORDER — ONDANSETRON 2 MG/ML
4 INJECTION INTRAMUSCULAR; INTRAVENOUS
Status: DISCONTINUED | OUTPATIENT
Start: 2021-07-02 | End: 2021-07-03 | Stop reason: HOSPADM

## 2021-07-02 RX ADMIN — AMLODIPINE BESYLATE 10 MG: 5 TABLET ORAL at 23:31

## 2021-07-02 RX ADMIN — IOPAMIDOL 140 ML: 755 INJECTION, SOLUTION INTRAVENOUS at 20:42

## 2021-07-02 NOTE — ED TRIAGE NOTES
Pt present with elevated BP and confusion per family members. Pt A&O x 4 in triage. Per family, pt has been confused since 1500 today. Code stroke level 1 initiated at this time. Dr. Arcos Gains in triage with pt for initial evaluation.

## 2021-07-02 NOTE — ED NOTES
Signs and symptoms: Confusion and elevated BP  VAN score: Negative  Last Known Well: 1500  Blood Glucose (EMS acceptable): 125   Blood Pressure (EMS acceptable): 196/87  Anticoagulants: No     Code Stroke Level 1 initiated at this time.

## 2021-07-03 VITALS
RESPIRATION RATE: 18 BRPM | DIASTOLIC BLOOD PRESSURE: 88 MMHG | HEIGHT: 68 IN | TEMPERATURE: 98 F | SYSTOLIC BLOOD PRESSURE: 161 MMHG | HEART RATE: 61 BPM | BODY MASS INDEX: 22.58 KG/M2 | WEIGHT: 149 LBS | OXYGEN SATURATION: 94 %

## 2021-07-03 LAB
ACETONE,ACETX: NEGATIVE MG/L
ALBUMIN SERPL-MCNC: 4.1 G/DL (ref 3.5–5)
ALBUMIN/GLOB SERPL: 1.1 {RATIO} (ref 1.1–2.2)
ALP SERPL-CCNC: 88 U/L (ref 45–117)
ALT SERPL-CCNC: 42 U/L (ref 12–78)
AMMONIA PLAS-SCNC: 24 UMOL/L
ANION GAP SERPL CALC-SCNC: 5 MMOL/L (ref 5–15)
AST SERPL-CCNC: 47 U/L (ref 15–37)
BILIRUB SERPL-MCNC: 1 MG/DL (ref 0.2–1)
BUN SERPL-MCNC: 9 MG/DL (ref 6–20)
BUN/CREAT SERPL: 10 (ref 12–20)
CALCIUM SERPL-MCNC: 8.7 MG/DL (ref 8.5–10.1)
CHAIN OF CUSTODY,CHC: NO
CHLORIDE SERPL-SCNC: 98 MMOL/L (ref 97–108)
CO2 SERPL-SCNC: 29 MMOL/L (ref 21–32)
CREAT SERPL-MCNC: 0.86 MG/DL (ref 0.7–1.3)
ETHANOL SERPL-MCNC: <10 MG/DL
ETHANOL,ETHX: NEGATIVE MG/L
GLOBULIN SER CALC-MCNC: 3.9 G/DL (ref 2–4)
GLUCOSE SERPL-MCNC: 89 MG/DL (ref 65–100)
ISOPROPANOL,ISOPX: NEGATIVE MG/L
MAGNESIUM SERPL-MCNC: 2.2 MG/DL (ref 1.6–2.4)
METHANOL,METHX: NEGATIVE MG/L
POTASSIUM SERPL-SCNC: 3.5 MMOL/L (ref 3.5–5.1)
PROT SERPL-MCNC: 8 G/DL (ref 6.4–8.2)
REPORT STATUS,RSTSX: NORMAL
SODIUM SERPL-SCNC: 132 MMOL/L (ref 136–145)
SPECIMEN SOURCE: NORMAL
TSH SERPL DL<=0.05 MIU/L-ACNC: 2.17 UIU/ML (ref 0.36–3.74)

## 2021-07-03 PROCEDURE — 74011000250 HC RX REV CODE- 250: Performed by: INTERNAL MEDICINE

## 2021-07-03 PROCEDURE — 74011250636 HC RX REV CODE- 250/636: Performed by: INTERNAL MEDICINE

## 2021-07-03 PROCEDURE — 74011250637 HC RX REV CODE- 250/637: Performed by: INTERNAL MEDICINE

## 2021-07-03 PROCEDURE — 96372 THER/PROPH/DIAG INJ SC/IM: CPT

## 2021-07-03 PROCEDURE — 96374 THER/PROPH/DIAG INJ IV PUSH: CPT

## 2021-07-03 PROCEDURE — 99218 HC RM OBSERVATION: CPT

## 2021-07-03 RX ORDER — HYDRALAZINE HYDROCHLORIDE 25 MG/1
50 TABLET, FILM COATED ORAL 3 TIMES DAILY
Status: DISCONTINUED | OUTPATIENT
Start: 2021-07-03 | End: 2021-07-03

## 2021-07-03 RX ADMIN — FOLIC ACID: 5 INJECTION, SOLUTION INTRAMUSCULAR; INTRAVENOUS; SUBCUTANEOUS at 00:28

## 2021-07-03 RX ADMIN — TENOFOVIR DISPROXIL FUMARATE 300 MG: 300 TABLET ORAL at 09:43

## 2021-07-03 RX ADMIN — Medication 10 ML: at 00:29

## 2021-07-03 RX ADMIN — AMLODIPINE BESYLATE 10 MG: 5 TABLET ORAL at 00:28

## 2021-07-03 RX ADMIN — ENOXAPARIN SODIUM 40 MG: 40 INJECTION SUBCUTANEOUS at 09:44

## 2021-07-03 NOTE — PROGRESS NOTES
I have reviewed discharge instructions with the patient and caregiver. The patient and caregiver verbalized understanding. IV removed. Signed copy of AVS placed in Pt chart.

## 2021-07-03 NOTE — ED PROVIDER NOTES
Is a 79-year-old male with history of hypertension, hepatocellular carcinoma, hep B who presents with family for confusion that started this afternoon. Son is at bedside who is translating as patient does not speak [place]. Reports that he last spoke with his father at 6 or 12 this morning and he sounded normal on the phone. 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 When they brought him into the garage, he kept repeating 'I do not know what is going on'. They thought he was overheated, they gave him water and checked his blood pressure which was noted to be elevated. They deny any focal weakness, unsteady gait. Code stroke Level One activated by RN in triage. During my evaluation, patient not oriented to time. Past Medical History:   Diagnosis Date    Arthritis     [facility]     Hepatoma ([facility] 1/6/2017    Hypertension     PT DENIES    Liver disease     HEPATITIS B    Liver mass, right lobe 4/22/2016    Mass of soft tissue of right lower extremity 2/24/2021    Pulmonary nodule 06/2016    right middle lobe, recheck 11/16 stable at 2 mm, recheck again in 6 mo    Ulnar nerve compression     right       Past Surgical History:   Procedure Laterality Date     [address]    HX LAP CHOLECYSTECTOMY      HX LIPOMA RESECTION  03/2021    HX ORTHOPAEDIC Right 2014    ELBOW    HX OTHER SURGICAL  01/06/2017    partial right [id number]. Heri [place]    IR OCCL TXCATH ORGAN W SI  10/14/2019         Family History:   Problem Relation Age of Onset    No Known Problems Mother     No Known Problems Father     No Known Problems Sister     No Known Problems Sister     Anesth Problems Neg Hx        Social History     Socioeconomic History    Marital status:      Spouse name: Not on file    Number of children: Not on file    Years of education: Not on file    Highest education level: Not on file Occupational History    Not on file   Tobacco Use    Smoking status: Current Every Day Smoker     Packs/day: 1.00     Years: 30.00     Pack years: 30.00     Types: Cigarettes    Smokeless tobacco: Never Used   Vaping Use    Vaping Use: Never used   Substance and Sexual Activity    Alcohol use: No     Alcohol/week: 0.0 standard drinks     Comment: quit 11/2016    Drug use: No    Sexual activity: Yes     Partners: Female   Other Topics Concern    Not on file   Social History Narrative    Not on file     Social Determinants of Health     Financial Resource Strain:     Difficulty of Paying Living Expenses:    Food Insecurity:     Worried About Running Out of Food in the Last Year:     [address] in the Last Year:    Transportation Needs:     Lack of Transportation (Medical):  Lack of Transportation (Non-Medical):    Physical Activity:     Days of Exercise per Week:     Minutes of Exercise per Session:    Stress:     Feeling of Stress :    Social Connections:     Frequency of Communication with Friends and Family:     Frequency of Social Gatherings with Friends and Family:     Attends [employer] Services:     Active Member of Clubs or Organizations:     Attends Club or Organization Meetings:     Marital Status:    Intimate Partner Violence:     Fear of Current or Ex-Partner:     Emotionally Abused:     Physically Abused:     Sexually Abused: ALLERGIES: Venom-honey bee and Lisinopril    Review of Systems   Constitutional: Negative for chills and fever. HENT: Negative for drooling and nosebleeds. Eyes: Negative for pain and itching. Respiratory: Negative for choking and stridor. Cardiovascular: Negative for leg swelling. Gastrointestinal: Negative for abdominal pain and rectal pain. Endocrine: Negative for heat intolerance and polyphagia. Genitourinary: Negative for enuresis and genital sores. Musculoskeletal: Negative for arthralgias and joint swelling. Skin: Negative for color change. Allergic/Immunologic: Negative for immunocompromised state. Neurological: Negative for tremors and speech difficulty. Hematological: Negative for adenopathy. Psychiatric/Behavioral: Positive for confusion. Negative for dysphoric mood and sleep disturbance. Vitals:    07/02/21 1918   BP: (!) 196/87   Pulse: 64   Resp: 17   Temp: 98.3 °F (36.8 °C)   SpO2: 98%   Weight: 67.6 kg (149 lb)   Height: 5' 8\" (1.727 m)            Physical Exam  Vitals and nursing note reviewed. Constitutional:       General: He is not in acute distress. Appearance: He is well-developed. He is not toxic-appearing or diaphoretic. HENT:      Head: Normocephalic. Nose: Nose normal.   Eyes:      Conjunctiva/sclera: Conjunctivae normal.   Cardiovascular:      Rate and Rhythm: Normal rate and regular rhythm. Heart sounds: Normal heart sounds. Pulmonary:      Effort: Pulmonary effort is normal. No respiratory distress. Abdominal:      General: There is no distension. Palpations: Abdomen is soft. Musculoskeletal:         General: No deformity. Normal range of motion. Cervical back: Normal range of motion and neck supple. Skin:     General: Skin is warm and dry. Neurological:      Mental Status: He is alert. He is disoriented. GCS: GCS eye subscore is 4. GCS verbal subscore is 5. GCS motor subscore is 6. Cranial Nerves: No cranial nerve deficit. Sensory: No sensory deficit. Motor: No weakness. Coordination: Coordination normal.      Gait: Gait normal.   Psychiatric:         Behavior: Behavior normal.          [facility]  ED Course as of Jul 02 2039 Fri Jul 02, 2021 2010 Dr Yeny Vivas says not tPA candidate. MRI brain without contrast. ASA. NIHSS of 2. [AL]   2013 Signed out to Dr Melinda Flores pending imaging. Ok to go home if symptoms resolve per Dr Yeny Vivas.      [AL]      ED Course User Index  [AL] Rick Mackey MD       Procedures      Perfect Serve Consult for Admission  10:41 PM    ED Room Number: ER02/02  Patient Name and age:  Landy Reese 57 y.o.  male  Working Diagnosis:   1. Altered mental status, unspecified altered mental status type        COVID-19 Suspicion:  no  Sepsis present:  no  Reassessment needed: no  Code Status:  Full Code  Readmission: no  Isolation Requirements:  no  Recommended Level of Care:  med/surg  Department:[facility] ed. Patient seen initially by Dr. Alivia Whitfield, jovanni stroke was called and was evaluated by teleneurology. They thought that the confusion was most likely related to elevated blood pressure. They said that if stroke work-up was negative and the patient's confusion cleared he could be discharged, however the patient continues to be confused, unchanged per the family.

## 2021-07-03 NOTE — PROGRESS NOTES
11:25 AM  CM noted dc order, no CM needs noted. Observation notice provided in writing to patient and/or caregiver as well as verbal explanation of the policy. Patients who are in outpatient status also receive the Observation notice. Patient has received notice and or patient representative has received via secure email, fax, or certified mail based on patient representative's preference.      Jo Ann Bowers

## 2021-07-03 NOTE — H&P
[facility]    Hospitalist Admission Note      NAME:  Tawny Thomas   :   1960   MRN:  [id number]     PCP:  Jcarlos Slater MD     Date/Time of service:  2021 11:11 PM          Subjective:     CHIEF COMPLAINT: confusion     HISTORY OF PRESENT ILLNESS:     Mr. Burnette Nageotte is a 57 y.o.  male who presented to the Emergency Department complaining of confusion. Per his family it was noted this afternoon and persists. ER full CVA workup was negative. BP was a bit elevated on presentation but is unremarkable now. He is an alcoholic drinking approx 12 beers daily with a hx of DTs. Alcohol level was not yet checked in ER. We were asked to admit him      Past Medical History:   Diagnosis Date    Arthritis     [facility] ([facility] [address]     Hepatocellular carcinoma ([facility]     Hepatoma ([facility] [address] [facility] 2017    HTN (hypertension)     Ulnar nerve compression     right        Past Surgical History:   Procedure Laterality Date    HX HEENT      JAW SURGERY-A. A.    HX LAP CHOLECYSTECTOMY      HX LIPOMA RESECTION  2021    HX ORTHOPAEDIC Right 2014    ELBOW    HX OTHER SURGICAL  2017    partial right [id number].  [address]    IR OCCL TXCATH ORGAN W SI  10/14/2019       Social History     Tobacco Use    Smoking status: Current Every Day Smoker     Packs/day: 1.00     Years: 30.00     Pack years: 30.00     Types: Cigarettes    Smokeless tobacco: Never Used   Substance Use Topics    Alcohol use: No     Alcohol/week: 0.0 standard drinks     Comment: quit 2016        Family History   Problem Relation Age of Onset    No Known Problems Mother     No Known Problems Father     No Known Problems Sister     No Known Problems Sister     Anesth Problems Neg Hx       Family hx cannot be fully assessed, since the patient cannot provide information    Allergies   Allergen Reactions    Venom-Honey Bee Anaphylaxis    Lisinopril Cough        Prior to Admission medications Medication Sig Start Date End Date Taking? Authorizing Provider   amLODIPine (NORVASC) 10 mg tablet Take 1 Tablet by mouth daily.  6/29/21  Yes Mervin Sy MD   tenofovir DISOPROXIL FUMARATE (VIREAD) 300 mg tablet TAKE 1 TABLET BY MOUTH ONCE DAILY 5/3/21  Yes KEY Giles       Review of Systems:  (bold if positive, if negative)    Gen:  fatigueEyes:  ENT:  CVS:  Pulm:  GI:  :  MS:  Skin:  Psych:  Endo:  Hem:  Renal:  Neuro:  weakness      Objective:      VITALS:    Vital signs reviewed; most recent are:    Visit Vitals  BP (!) 162/87   Pulse 60   Temp 98.3 °F (36.8 °C)   Resp 17   Ht 5' 8\" (1.727 m)   Wt 67.6 kg (149 lb)   SpO2 95%   BMI 22.66 kg/m²     SpO2 Readings from Last 6 Encounters:   07/02/21 95%   06/29/21 97%   05/03/21 96%   03/29/21 96%   03/12/21 96%   02/24/21 94%        No intake or output data in the 24 hours ending 07/02/21 2311     Exam:     Physical Exam:    Gen:  Thin, in no acute distress  HEENT:  Pink conjunctivae, PERRL, hearing intact to voice, moist mucous membranes  Neck:  Supple, without masses, thyroid non-tender  Resp:  No accessory muscle use, clear breath sounds without wheezes rales or rhonchi  Card:  No murmurs, normal S1, S2 without thrills, bruits or peripheral edema  Abd:  Soft, non-tender, non-distended, normoactive bowel sounds are present, no mass  Lymph:  No cervical or inguinal adenopathy  Musc:  No cyanosis or clubbing  Skin:  No rashes or ulcers, skin turgor is good  Neuro:  Cranial nerves are grossly intact, general motor weakness, follows command vaguely  Psych:  Poor insight, oriented to person, sleepy     Labs:    Recent Labs     07/02/21 2007   WBC 7.6   HGB 16.0   HCT 44.8        Recent Labs     07/02/21 2007   *   K 3.3*   CL 97   CO2 28   GLU 81   BUN 10   CREA 0.93   CA 8.7   ALB 4.2   TBILI 0.7   ALT 42     Lab Results   Component Value Date/Time    Glucose (POC) 125 (H) 07/02/2021 07:23 PM    Glucose (POC) 106 (H) 07/31/2017 10:13 AM     No results for input(s): PH, PCO2, PO2, HCO3, FIO2 in the last 72 hours. Recent Labs     07/02/21 2007   INR 1.0     All Micro Results     None          I have reviewed previous records       Assessment and Plan:      Acute metabolic encephalopathy - POA, unclear etiology. CVA ruled out in ER. I suspect alcohol intoxication. Check labs, ammonia, etc.  Monitor. Neuro consult in AM.    HTN (hypertension) - POA, recently switched from lisinopril to Norvasc due to cough. Resume Norvasc. May need to add hydralazine etc.    Alcohol abuse - I suspect he is drunk. Check alcohol level. CIWA protocol. I will not yet start phenobarbital.  Prn ativan. Good bag. Hyponatremia / hypokalemia - POA, mild, unclear cause or chronicity, likely related to alcohol. Replete via IVF. Chronic hepatitis B / Hx Hepatocellular carcinoma - He I monitored closely as outpatient with recent normal scans and serologies. Telemetry reviewed:   normal sinus rhythm    Risk of deterioration: high      Total time spent with patient: 48 Minutes I personally reviewed chart, notes, data and current medications in the medical record. I have personally examined and treated the patient at bedside during this period.                  Care Plan discussed with: Patient, Nursing Staff and >50% of time spent in counseling and coordination of care    Discussed:  Care Plan       ___________________________________________________    Attending Physician: Louise Pacheco MD

## 2021-07-03 NOTE — DISCHARGE INSTRUCTIONS
Discharge Instructions       PATIENT ID: Emory Da Silva  MRN: [id number]   YOB: 1960    DATE OF ADMISSION: 7/2/2021  7:29 PM    DATE OF DISCHARGE: 7/3/2021    PRIMARY CARE PROVIDER: Carey Cuevas MD     ATTENDING PHYSICIAN: Chilo Nguyen MD  DISCHARGING PROVIDER: Libia Carrasco MD        CONSULTATIONS: IP CONSULT TO NEUROLOGY    PROCEDURES/SURGERIES: * No surgery found *    PENDING TEST RESULTS:   At the time of discharge the following test results are still pending: none    FOLLOW UP APPOINTMENTS:   Follow-up Information     Follow up With Specialties Details Why Contact Info    Carey Cuevas MD Family Medicine In 1 week Follow up for blood pressure [address]  [phone]             DIET: regular    ACTIVITY: as tolerated      DISCHARGE MEDICATIONS:   See Medication Reconciliation Form    · It is important that you take the medication exactly as they are prescribed. · Keep your medication in the bottles provided by the pharmacist and keep a list of the medication names, dosages, and times to be taken in your wallet. · Do not take other medications without consulting your doctor. NOTIFY YOUR PHYSICIAN FOR ANY OF THE FOLLOWING:   Fever over 101 degrees for 24 hours. Chest pain, shortness of breath, fever, chills, nausea, vomiting, diarrhea, change in mentation, falling, weakness, bleeding. Severe pain or pain not relieved by medications. Or, any other signs or symptoms that you may have questions about.       DISPOSITION:   x Home With:   OT  PT  HH  RN       SNF/Inpatient Rehab/LTAC    Independent/assisted living    Hospice    Other:     CDMP Checked:   Yes ***     PROBLEM LIST Updated:  Yes ***       Signed:   Libia Carrasco MD  7/3/2021  11:15 AM

## 2021-07-03 NOTE — DISCHARGE SUMMARY
Discharge Summary       PATIENT ID: Miko [place]  MRN: [id number]   YOB: 1960    DATE OF ADMISSION: 7/2/2021  7:29 PM    DATE OF DISCHARGE: 07/03/21   PRIMARY CARE PROVIDER: Mason Jha MD     DISCHARGING PROVIDER: Lamonte Barraza MD      CONSULTATIONS: IP CONSULT TO NEUROLOGY    PROCEDURES/SURGERIES: * No surgery found *    ADMITTING [address] [place] [address] COURSE:     Acute metabolic encephalopathy - POA, unclear etiology. CVA ruled out in ER.  ammonia okay and tox screen neg. Was likely dehydration since he improved back to baseline with banana bag/IVF.      HTN (hypertension) - POA, recently switched from lisinopril to Norvasc due to cough.  Resume Norvasc but BP still elevated, patient reluctant to start a 2nd medication today, advised to monitor BP at home and follow up closely with PCP in 1 week.      Alcohol abuse - blood alcohol levels neg, was monitored with CIWA while here. Hyponatremia / hypokalemia - POA, mild, unclear cause or chronicity, likely related to alcohol. Replete via IVF.     Chronic hepatitis B / Hx Hepatocellular carcinoma - He I monitored closely as outpatient with recent normal scans and serologies. PENDING TEST RESULTS:   At the time of discharge the following test results are still pending: none    FOLLOW UP APPOINTMENTS:    Follow-up Information     Follow up With Specialties Details Why Contact Info    Mason Jha MD Family Medicine In 1 week Follow up for blood pressure [address]  [phone]               DIET: regular    ACTIVITY: as tolerated      DISCHARGE MEDICATIONS:  Current Discharge Medication List      CONTINUE these medications which have NOT CHANGED    Details   amLODIPine (NORVASC) 10 mg tablet Take 1 Tablet by mouth daily.   Qty: 30 Tablet, Refills: 0    Associated Diagnoses: Essential hypertension      tenofovir DISOPROXIL FUMARATE (VIREAD) 300 mg tablet TAKE 1 TABLET BY MOUTH ONCE DAILY  Qty: 30 Tab, Refills: 11               NOTIFY YOUR PHYSICIAN FOR ANY OF THE FOLLOWING:   Fever over 101 degrees for 24 hours. Chest pain, shortness of breath, fever, chills, nausea, vomiting, diarrhea, change in mentation, falling, weakness, bleeding. Severe pain or pain not relieved by medications. Or, any other signs or symptoms that you may have questions about. DISPOSITION:  x  Home With:   OT  PT  HH  RN       Long term SNF/Inpatient Rehab    Independent/assisted living    Hospice    Other:       PATIENT CONDITION AT DISCHARGE:     Functional status    Poor     Deconditioned    x Independent      Cognition    x Lucid     Forgetful     Dementia      Catheters/lines (plus indication)    Agarwal     PICC     PEG    x None      Code status   x  Full code     DNR      PHYSICAL EXAMINATION AT DISCHARGE:  General:          Alert, cooperative, no distress, appears stated age. HEENT:           Atraumatic, anicteric sclerae, pink conjunctivae                          No oral ulcers, mucosa moist, throat clear, dentition fair  Neck:               Supple, symmetrical  Lungs:             Clear to auscultation bilaterally. No Wheezing or Rhonchi. No rales. Heart:              Regular  rhythm,  No  murmur   No edema  Abdomen:        Soft, non-tender. Not distended. Bowel sounds normal  Extremities:     No cyanosis. No clubbing,                            Skin turgor normal, Capillary refill normal  Skin:                Not pale. Not Jaundiced  No rashes   Psych:             Not anxious or agitated.   Neurologic:      Alert, moves all extremities, answers questions appropriately and responds to commands       CHRONIC MEDICAL DIAGNOSES:  Problem List as of 7/3/2021 Date Reviewed: 7/2/2021        Codes Class Noted - Resolved    HTN (hypertension) ICD-10-CM: I10  ICD-9-CM: 401.9  Unknown - Present        * (Principal) Acute metabolic encephalopathy USG-36-MR: G93.41  ICD-9-CM: 348.31  7/2/2021 - Present        Alcohol abuse ICD-10-CM: F10.10  ICD-9-CM: 305.00  Unknown - Present        Arthritis ICD-10-CM: M19.90  ICD-9-CM: 716.90  Unknown - Present        Hepatocellular carcinoma ([facility] ICD-10-CM: C22.0  ICD-9-CM: 155.0  1/6/2017 - Present        Chronic hepatitis B ([facility] ICD-10-CM: B18.1  ICD-9-CM: 070.32  4/22/2016 - Present        RESOLVED: Arthritis ICD-10-CM: M19.90  ICD-9-CM: 716.90  Unknown - 7/2/2021        RESOLVED: Hepatitis B carrier ([facility] ICD-10-CM: B18.1  ICD-9-CM: V02.61  Unknown - 7/2/2021        RESOLVED: Mass of soft tissue of right lower extremity ICD-10-CM: M79.89  ICD-9-CM: 729.99  2/24/2021 - 7/2/2021        RESOLVED: H/O resection of liver ICD-10-CM: Z90.49  ICD-9-CM: V15.29  2/16/2018 - 7/2/2021    Overview Signed 2/16/2018  8:00 PM by Shruti Rivas MD     For treatment of [facility]   2017             RESOLVED: Liver hematoma, infected ICD-10-CM: V54.351S  ICD-9-CM: 864.01  3/16/2017 - 7/2/2021        RESOLVED: Pulmonary nodule ICD-10-CM: R91.1  ICD-9-CM: 793.11  6/10/2016 - 7/2/2021        RESOLVED: Liver mass, right lobe ICD-10-CM: R16.0  ICD-9-CM: 573.8  4/22/2016 - 7/2/2021              Greater than 35 minutes were spent with the patient on counseling and coordination of care    Signed:   Ernestina Lion MD  7/3/2021  11:09 AM

## 2021-07-03 NOTE — PROGRESS NOTES
Problem: Falls - Risk of  Goal: *Absence of Falls  Description: Document Simth Crowe Fall Risk and appropriate interventions in the flowsheet.   Outcome: Progressing Towards Goal  Note: Fall Risk Interventions:

## 2021-07-05 LAB
ATRIAL RATE: 54 BPM
CALCULATED P AXIS, ECG09: 57 DEGREES
CALCULATED R AXIS, ECG10: -15 DEGREES
CALCULATED T AXIS, ECG11: 31 DEGREES
DIAGNOSIS, 93000: NORMAL
P-R INTERVAL, ECG05: 192 MS
Q-T INTERVAL, ECG07: 456 MS
QRS DURATION, ECG06: 100 MS
QTC CALCULATION (BEZET), ECG08: 432 MS
VENTRICULAR RATE, ECG03: 54 BPM

## 2021-07-06 ENCOUNTER — PATIENT OUTREACH (OUTPATIENT)
Dept: CASE MANAGEMENT | Age: 61
End: 2021-07-06

## 2021-07-06 NOTE — PROGRESS NOTES
Care Transitions Initial Call    Call within 2 business days of discharge: Yes     Patient: Coarl Benson Patient : 1960 MRN: [id number]    Last Discharge [address]       Complaint Diagnosis Description Type Department Provider    21 Altered mental status; Hypertension Altered mental status, unspecified altered mental status type ED to Hosp-Admission (Discharged) (ADMIT) [id number] Barbara Shine MD; Jyoti Marquez. .. Was this an external facility discharge? No     Challenges to be reviewed by the provider   Additional needs identified to be addressed with provider:  Yes    AMS at work- CVA ruled out, dehydration and HTN POA. Method of communication with provider : chart routing    [phone] related testing was not done at this time. Advance Care Planning:   Does patient have an Advance Directive: not on file. Inpatient Readmission Risk score: No data recorded  Was this a readmission? no     Patients top risk factors for readmission: lack of knowledge about disease, level of motivation and medical condition-Hypertension   Interventions to address risk factors: Education of patient/family/caregiver/guardian to support self-management-HTN and hydration, Assessment and support for treatment adherence and medication management-monitoring BP and HR, daily weights, following low NA diet and maintaining hydration and PCP appointment scheduled    Care Transition Nurse (CTN) contacted the patient by telephone to perform post hospital discharge assessment. Verified name and  with son, Logan Evangelista as identifiers. Provided introduction to self, and explanation of the CTN role. CTN reviewed discharge instructions, medical action plan and red flags with family who verbalized understanding. Were discharge instructions available to patient? yes. Reviewed appropriate site of care based on symptoms and resources available to patient including: PCP and CTN.  Family given an opportunity to ask questions and does not have any further questions or concerns at this time. The family agrees to contact the PCP office for questions related to their healthcare. Medication reconciliation was performed with family, who verbalizes understanding of administration of home medications. Advised obtaining a 90-day supply of all daily and as-needed medications. Referral to Pharm D needed: no     Home Health/Outpatient orders at discharge: none    Durable Medical Equipment ordered at discharge: None    Covid Risk Education    Educated patient about risk for severe COVID-19 due to risk factors according to CDC guidelines. CTN reviewed discharge instructions, medical action plan and red flag symptoms with the family who verbalized understanding. Discussed COVID vaccination status: no. Education provided on COVID-19 vaccination as appropriate. Discussed exposure protocols and quarantine with CDC Guidelines. Family was given an opportunity to verbalize any questions and concerns and agrees to contact CTN or health care provider for questions related to their healthcare. Was patient discharged with a pulse oximeter? NA    Discussed follow-up appointments. If no appointment was previously scheduled, appointment scheduling offered: yes. Is follow up appointment scheduled within 7 days of discharge? no appointments in place at time of discharge- son will call PCP office about scheduling sooner appointment that original 3 wk follow up previously planned. [facility] follow up appointment(s):   Future Appointments   Date Time Provider Cristino Lawrence   11/6/2021  9:00 AM [facility] 2 [facility]   11/6/2021 10:00 AM [facility] 2 [facility]   11/8/2021  3:40 PM [facility]     [facility] follow up appointment(s): NA    Plan for follow-up call in 3-5 days based on severity of symptoms and risk factors.   Plan for next call: self management-monitoring BP and HR, daily weights, following low NA diet and maintaining hydration and follow up appointment-with PCP  CTN provided contact information for future needs. Goals Addressed                 This Visit's Progress       Chronic Disease     Initiate and reinforce education of the patient/family about management of disease and lifestyle changes. 7/6/21- spoke with armaan Colon. Father (family) have lived here since 17. His father does still drink some alcohol. Explained affects of ETOH on hydration. His father does monitor BP values at home. Explained good routine for checking BP. Check BP and HR prior to taking amlodipine. Target: 100-140/55-80-85. Call PCP for values consistently out of range. Teaching done on amlodipine- asked for father to monitor daily weights- explained good routine for weighing. Reach out for guidance about weight gain; signs of fluid retention. Discussion about lifestyle modifications to help with BP control:    Eating low NA- asked for stay under 400 mg total per intake- including beverages. stay under 2000 mg per day.  Regular exercise-active lifestyle does help but regular cardio for 20-30 min; 5 days a week will help. Son, John Colon states that father is stubborn- CTN offered 3 way conversation with him, father and CTN or  and myself to review above. [employer]          General     Reduce risk for hospitalization        7/6/21- spoke with sonJohn. Father did go into work today but he started not to feel well again due to heat and went home. Did not work over the weekend- felt well. Son was not at work today- works with father- had first child born this past Friday- they work together in a [employer]. Reviewed options to help with maintaining hydration and temperature control.   [employer]

## 2021-07-07 ENCOUNTER — OFFICE VISIT (OUTPATIENT)
Dept: FAMILY MEDICINE CLINIC | Age: 61
End: 2021-07-07
Payer: COMMERCIAL

## 2021-07-07 VITALS
OXYGEN SATURATION: 96 % | TEMPERATURE: 97.3 F | WEIGHT: 142 LBS | BODY MASS INDEX: 21.52 KG/M2 | HEART RATE: 68 BPM | HEIGHT: 68 IN | RESPIRATION RATE: 18 BRPM | DIASTOLIC BLOOD PRESSURE: 78 MMHG | SYSTOLIC BLOOD PRESSURE: 149 MMHG

## 2021-07-07 DIAGNOSIS — Z09 HOSPITAL DISCHARGE FOLLOW-UP: ICD-10-CM

## 2021-07-07 DIAGNOSIS — G93.41 ACUTE METABOLIC ENCEPHALOPATHY: ICD-10-CM

## 2021-07-07 DIAGNOSIS — F10.20 ALCOHOL USE DISORDER, MODERATE, DEPENDENCE (HCC): ICD-10-CM

## 2021-07-07 DIAGNOSIS — I10 ESSENTIAL HYPERTENSION: Primary | ICD-10-CM

## 2021-07-07 PROCEDURE — 99215 OFFICE O/P EST HI 40 MIN: CPT | Performed by: FAMILY MEDICINE

## 2021-07-07 PROCEDURE — 1111F DSCHRG MED/CURRENT MED MERGE: CPT | Performed by: FAMILY MEDICINE

## 2021-07-07 RX ORDER — LOSARTAN POTASSIUM 25 MG/1
25 TABLET ORAL DAILY
Qty: 30 TABLET | Refills: 0 | Status: SHIPPED | OUTPATIENT
Start: 2021-07-07 | End: 2021-07-27 | Stop reason: DRUGHIGH

## 2021-07-07 NOTE — PROGRESS NOTES
1. Have you been to the ER, urgent care clinic since your last visit? Hospitalized since your last visit? Yes - ER to Hospital Admission 7/2/21 thru 3/5/20 - Acute metabolic encephalopathy. 2. Have you seen or consulted any other health care providers outside of the [address] [place] [address] since your last visit? Include any pap smears or colon screening. No       7/7/2021      Chief Complaint   Patient presents with   [facility] Hypertension   [facility] Follow Up     ER to Hospital admission 7/2/21 thru 7/3/56 (Acute Metabolic Encephalopathy          History of Present Illness:        Hu Wilkinson is a 57 y.o. male admitted 5/6-8/8 for metabolic encephalopathy which was felt to be related to overheating after CVA ruled out and resolution with hydration. Continues to drink, labs demonstrated hypokalemia and hyponatremia which are chronic. SBP has continued to be very high with pressures over 200 after switching from lisinopril to amlodipine. No edema. Allergies   Allergen Reactions    Venom-Honey Bee Anaphylaxis    Lisinopril Cough       Current Outpatient Medications   Medication Sig    losartan (COZAAR) 25 mg tablet Take 1 Tablet by mouth daily.  amLODIPine (NORVASC) 10 mg tablet Take 1 Tablet by mouth daily.  tenofovir DISOPROXIL FUMARATE (VIREAD) 300 mg tablet TAKE 1 TABLET BY MOUTH ONCE DAILY     No current facility-administered medications for this visit. Physical Examination:    Visit Vitals  BP (!) 149/78 (BP 1 Location: Left upper arm, BP Patient Position: Sitting, BP Cuff Size: Adult)   Pulse 68   Temp 97.3 °F (36.3 °C) (Oral)   Resp 18   Ht 5' 8\" (1.727 m)   Wt 142 lb (64.4 kg)   SpO2 96%   BMI 21.59 kg/m²      General:  Alert, cooperative, no distress. HEENT:  Normocephalic, without obvious abnormality, atraumatic. Conjunctivae/corneas clear. Pupils equal, round, reactive to light. Extraocular movements intact. TMs and external canals normal bilaterally.  Nasal mucosa and oropharynx clear.   Lungs: Clear to auscultation bilaterally. Chest wall:  No tenderness or deformity. Heart:  Regular rate and rhythm, S1, S2 normal, no murmur, click, rub, or gallop. Abdomen:   Soft, non-tender. Bowel sounds normal. No masses. No organomegaly. Extremities: Extremities normal, atraumatic, no cyanosis or edema. Pulses: 2+ and symmetric all extremities. Skin: Skin color, texture, turgor normal. No rashes or lesions. Lymph nodes: Cervical, supraclavicular, and axillary nodes normal.   Neurologic: CNII-XII intact. Normal strength, sensation, and reflexes throughout. ASSESSMENT AND PLAN    1. Essential hypertension  Add ARB. Keep scheduled fu in two weeks  - losartan (COZAAR) 25 mg tablet; Take 1 Tablet by mouth daily. Dispense: 30 Tablet; Refill: 0    2. Alcohol use disorder, moderate, dependence (HCC)  Source of poorly controlled systolic HTN    3. Acute metabolic encephalopathy  Resolved. Orders Placed This Encounter    losartan (COZAAR) 25 mg tablet     Sig: Take 1 Tablet by mouth daily.      Dispense:  30 Tablet     Refill:  0           Dhaval Pradhan MD

## 2021-07-07 NOTE — LETTER
NOTIFICATION RETURN TO WORK / SCHOOL    7/7/2021 8:26 AM    Mr. Woodroe Nissen  [address] [id number]      To Whom It May Concern:    Woodroe Nissen is currently under the care of Mirna Looney. He will return to work/school on: 7/12/2021    If there are questions or concerns please have the patient contact our office.         Sincerely,      Antonella Lee MD

## 2021-07-27 ENCOUNTER — OFFICE VISIT (OUTPATIENT)
Dept: FAMILY MEDICINE CLINIC | Age: 61
End: 2021-07-27
Payer: COMMERCIAL

## 2021-07-27 VITALS
WEIGHT: 144 LBS | TEMPERATURE: 97.2 F | HEIGHT: 66 IN | BODY MASS INDEX: 23.14 KG/M2 | OXYGEN SATURATION: 98 % | DIASTOLIC BLOOD PRESSURE: 84 MMHG | SYSTOLIC BLOOD PRESSURE: 144 MMHG | RESPIRATION RATE: 12 BRPM | HEART RATE: 63 BPM

## 2021-07-27 DIAGNOSIS — I10 ESSENTIAL HYPERTENSION: ICD-10-CM

## 2021-07-27 PROCEDURE — 99213 OFFICE O/P EST LOW 20 MIN: CPT | Performed by: FAMILY MEDICINE

## 2021-07-27 RX ORDER — LOSARTAN POTASSIUM 50 MG/1
50 TABLET ORAL DAILY
Qty: 30 TABLET | Refills: 0 | Status: SHIPPED | OUTPATIENT
Start: 2021-07-27 | End: 2021-08-30 | Stop reason: SDUPTHER

## 2021-07-27 RX ORDER — AMLODIPINE BESYLATE 10 MG/1
10 TABLET ORAL DAILY
Qty: 90 TABLET | Refills: 0 | Status: SHIPPED | OUTPATIENT
Start: 2021-07-27 | End: 2021-10-25

## 2021-07-27 NOTE — PROGRESS NOTES
1. Have you been to the ER, urgent care clinic since your last visit? Hospitalized since your last visit? No    2. Have you seen or consulted any other health care providers outside of the [address] [place] [address] since your last visit? Include any pap smears or colon screening.  No

## 2021-07-27 NOTE — PROGRESS NOTES
Titi Freeman is a 57 y.o. male who presents to the office today with the following:  Chief Complaint   Patient presents with    Hypertension     follow up       HPI  HTN  3w ago Lisinopril was changed to Norvasc d/t cough  Next day he was at work and it was very hot and BP was very high  Had heat exhaustion  Then pt ended up in ER d/t acute metabolic encephalopathy and BP was over 200  Was kept in observation and BP was brought down    He had a F/U with Dr Kathryn Puckett who added Losartan 25 mg  Pt here for recheck  occ pt is taking another Losartan 25 mg in afternoon when BP is high    Cough is better    Review of Systems   Respiratory: Negative for cough. Cardiovascular: Negative for chest pain and leg swelling. Genitourinary: Positive for frequency and urgency. Negative for dysuria. Neurological: Negative for dizziness and headaches. See HPI. Past Medical History:   Diagnosis Date    Alcohol abuse     Arthritis     [facility] carrier ([facility]     Hepatocellular carcinoma ([facility]     Hepatoma ([facility] 1/6/2017    HTN (hypertension)     Ulnar nerve compression     right       Past Surgical History:   Procedure Laterality Date    HX HEENT  1997    JAW SURGERY-A. A.    HX LAP CHOLECYSTECTOMY      HX LIPOMA RESECTION  03/2021    HX ORTHOPAEDIC Right 2014    ELBOW    HX OTHER SURGICAL  01/06/2017    partial right [id number]. Curlee Pearson    IR OCCL TXCATH ORGAN W SI  10/14/2019       Allergies   Allergen Reactions    Venom-Honey Bee Anaphylaxis    Lisinopril Cough       Current Outpatient Medications   Medication Sig    losartan (COZAAR) 50 mg tablet Take 1 Tablet by mouth daily.  amLODIPine (NORVASC) 10 mg tablet Take 1 Tablet by mouth daily.  tenofovir DISOPROXIL FUMARATE (VIREAD) 300 mg tablet TAKE 1 TABLET BY MOUTH ONCE DAILY     No current facility-administered medications for this visit.        Social History     Socioeconomic History    Marital status:      Spouse name: Not on file    Number of children: Not on file    Years of education: Not on file    Highest education level: Not on file   Tobacco Use    Smoking status: Current Every Day Smoker     Packs/day: 1.00     Years: 30.00     Pack years: 30.00     Types: Cigarettes    Smokeless tobacco: Never Used   Vaping Use    Vaping Use: Never used   Substance and Sexual Activity    Alcohol use: No     Alcohol/week: 0.0 standard drinks     Comment: quit 11/2016    Drug use: No    Sexual activity: Yes     Partners: Female     Social Determinants of Health     Financial Resource Strain:     Difficulty of Paying Living Expenses:    Food Insecurity:     Worried About Running Out of Food in the Last Year:     Ran Out of Food in the Last Year:    Transportation Needs:     Lack of Transportation (Medical):  Lack of Transportation (Non-Medical):    Physical Activity:     Days of Exercise per Week:     Minutes of Exercise per Session:    Stress:     Feeling of Stress :    Social Connections:     Frequency of Communication with Friends and Family:     Frequency of Social Gatherings with Friends and Family:     Attends [employer] Services:     Active Member of Clubs or Organizations:     Attends Club or Organization Meetings:     Marital Status:        Family History   Problem Relation Age of Onset    No Known Problems Mother     No Known Problems Father     No Known Problems Sister     No Known Problems Sister     Anesth Problems Neg Hx          Physical Exam:  Visit Vitals  BP (!) 144/84   Pulse 63   Temp 97.2 °F (36.2 °C)   Resp 12   Ht 5' 6\" (1.676 m)   Wt 144 lb (65.3 kg)   SpO2 98%   BMI 23.24 kg/m²     Physical Exam  Vitals and nursing note reviewed. Constitutional:       Appearance: He is normal weight. HENT:      Head: Normocephalic and atraumatic. Eyes:      Extraocular Movements: Extraocular movements intact.       Conjunctiva/sclera: Conjunctivae normal.   Cardiovascular:      Rate and Rhythm: Normal rate and regular rhythm. Heart sounds: Normal heart sounds. Pulmonary:      Effort: Pulmonary effort is normal.      Breath sounds: Normal breath sounds. Musculoskeletal:      Right lower leg: No edema. Left lower leg: No edema. Neurological:      Mental Status: He is alert and oriented to person, place, and time. Psychiatric:         Mood and Affect: Mood normal.         Behavior: Behavior normal.       Pt does not want to have urine checked, he thinks he is ok    Assessment/Plan:    ICD-10-CM ICD-9-CM    1.  Essential hypertension  I10 401.9 losartan (COZAAR) 50 mg tablet      amLODIPine (NORVASC) 10 mg tablet     Cont Amlodipine 10 in am  Losartan in creased to 50 mg to take in pm  Recheck in 1 mo      Corrina Ocampo MD

## 2021-08-14 NOTE — TELEPHONE ENCOUNTER
[facility] called back and said patient just needs an appt. with Dr. Gillian Swain. Patients info given to Karena Garcia to make appt for wed. for patient to see Dr. Gillian Swain.
Soha with [facility] called stating patient and son are anxious and waiting to hear from Dr. Jennifer Duran since patient is still in pain. I told her I would route this message to Dr. Jennifer Duran as well as put on his desk.
Raymon MOISE

## 2021-08-30 ENCOUNTER — OFFICE VISIT (OUTPATIENT)
Dept: FAMILY MEDICINE CLINIC | Age: 61
End: 2021-08-30
Payer: COMMERCIAL

## 2021-08-30 VITALS
SYSTOLIC BLOOD PRESSURE: 172 MMHG | BODY MASS INDEX: 23.3 KG/M2 | TEMPERATURE: 97.9 F | HEART RATE: 67 BPM | WEIGHT: 145 LBS | HEIGHT: 66 IN | DIASTOLIC BLOOD PRESSURE: 92 MMHG | OXYGEN SATURATION: 98 %

## 2021-08-30 DIAGNOSIS — I10 ESSENTIAL HYPERTENSION: ICD-10-CM

## 2021-08-30 PROCEDURE — 99213 OFFICE O/P EST LOW 20 MIN: CPT | Performed by: FAMILY MEDICINE

## 2021-08-30 RX ORDER — LOSARTAN POTASSIUM 50 MG/1
50 TABLET ORAL DAILY
Qty: 90 TABLET | Refills: 1 | Status: SHIPPED | OUTPATIENT
Start: 2021-08-30 | End: 2022-01-25 | Stop reason: SDUPTHER

## 2021-08-30 NOTE — PROGRESS NOTES
Miller Gil is a 57 y.o. male who presents to the office today with the following:  Chief Complaint   Patient presents with    Hypertension       HPI   Does not want , son present with pt and pt able to understand a lot of english    HTN  BP at home 127-135/78    On Losartan 50 and Amlodipine 10  Cough now and then still but much better than before  And still smoking    Has CT chest scheduled in Oct      Review of Systems   Respiratory: Negative for cough. Cardiovascular: Negative for leg swelling. Neurological: Negative for dizziness and headaches. See HPI. Past Medical History:   Diagnosis Date    Alcohol abuse     Arthritis     [facility] carrier ([facility]     Hepatocellular carcinoma ([facility]     Hepatoma ([facility] 1/6/2017    HTN (hypertension)     Ulnar nerve compression     right       Past Surgical History:   Procedure Laterality Date    HX HEENT  1997    JAW SURGERY-A. A.    HX LAP CHOLECYSTECTOMY      HX LIPOMA RESECTION  03/2021    HX ORTHOPAEDIC Right 2014    ELBOW    HX OTHER SURGICAL  01/06/2017    partial right [id number]. Garrison Papito    IR OCCL TXCATH ORGAN W SI  10/14/2019       Allergies   Allergen Reactions    Venom-Honey Bee Anaphylaxis    Lisinopril Cough       Current Outpatient Medications   Medication Sig    losartan (COZAAR) 50 mg tablet Take 1 Tablet by mouth daily.  amLODIPine (NORVASC) 10 mg tablet Take 1 Tablet by mouth daily.  tenofovir DISOPROXIL FUMARATE (VIREAD) 300 mg tablet TAKE 1 TABLET BY MOUTH ONCE DAILY     No current facility-administered medications for this visit.        Social History     Socioeconomic History    Marital status:      Spouse name: Not on file    Number of children: Not on file    Years of education: Not on file    Highest education level: Not on file   Tobacco Use    Smoking status: Current Every Day Smoker     Packs/day: 1.00     Years: 30.00     Pack years: 30.00     Types: Cigarettes    Smokeless tobacco: Never Used   Vaping Use    Vaping Use: Never used   Substance and Sexual Activity    Alcohol use: No     Alcohol/week: 0.0 standard drinks     Comment: quit 11/2016    Drug use: No    Sexual activity: Yes     Partners: Female     Social Determinants of Health     Financial Resource Strain:     Difficulty of Paying Living Expenses:    Food Insecurity:     Worried About Running Out of Food in the Last Year:     [address] in the Last Year:    Transportation Needs:     Lack of Transportation (Medical):  Lack of Transportation (Non-Medical):    Physical Activity:     Days of Exercise per Week:     Minutes of Exercise per Session:    Stress:     Feeling of Stress :    Social Connections:     Frequency of Communication with Friends and Family:     Frequency of Social Gatherings with Friends and Family:     Attends [employer] Services:     Active Member of Clubs or Organizations:     Attends Club or Organization Meetings:     Marital Status:        Family History   Problem Relation Age of Onset    No Known Problems Mother     No Known Problems Father     No Known Problems Sister     No Known Problems Sister     Anesth Problems Neg Hx          Physical Exam:  Visit Vitals  BP (!) 172/92   Pulse 67   Temp 97.9 °F (36.6 °C)   Ht 5' 6\" (1.676 m)   Wt 145 lb (65.8 kg)   SpO2 98%   BMI 23.40 kg/m²     Physical Exam  Vitals and nursing note reviewed. Constitutional:       Appearance: He is normal weight. HENT:      Head: Normocephalic and atraumatic. Right Ear: Tympanic membrane, ear canal and external ear normal.      Left Ear: Tympanic membrane, ear canal and external ear normal.   Eyes:      Extraocular Movements: Extraocular movements intact. Conjunctiva/sclera: Conjunctivae normal.   Cardiovascular:      Rate and Rhythm: Normal rate and regular rhythm. Heart sounds: Normal heart sounds.    Pulmonary:      Effort: Pulmonary effort is normal.      Breath sounds: Normal breath sounds. Abdominal:      Tenderness: There is no right CVA tenderness or left CVA tenderness. Musculoskeletal:      Right lower leg: No edema. Left lower leg: No edema. Lymphadenopathy:      Cervical: No cervical adenopathy. Skin:     General: Skin is warm and dry. Neurological:      Mental Status: He is alert and oriented to person, place, and time. Psychiatric:         Mood and Affect: Mood normal.         Behavior: Behavior normal.         Assessment/Plan:    ICD-10-CM ICD-9-CM    1.  Essential hypertension  I10 401.9 losartan (COZAAR) 50 mg tablet     F/U in 6 mo  Sooner if BP at home is higher again in the 150 or higher      Nazario Rivas MD

## 2021-09-19 NOTE — ED TRIAGE NOTES
Pt reports having blurred vision that began @ 0330 this morning. Pt denies HA or having double vision.
20-Sep-2021

## 2021-09-22 ENCOUNTER — VIRTUAL VISIT (OUTPATIENT)
Dept: FAMILY MEDICINE CLINIC | Age: 61
End: 2021-09-22
Payer: COMMERCIAL

## 2021-09-22 DIAGNOSIS — Z20.822 CLOSE EXPOSURE TO COVID-19 VIRUS: Primary | ICD-10-CM

## 2021-09-22 PROCEDURE — 99213 OFFICE O/P EST LOW 20 MIN: CPT | Performed by: FAMILY MEDICINE

## 2021-09-22 NOTE — PROGRESS NOTES
Gonzalo Galicia is a 57 y.o. male who was seen by synchronous (real-time) audio-video technology on 9/22/2021 for Concern For COVID-19 (Coronavirus) (doxy [phone])        Assessment & Plan:   Diagnoses and all orders for this visit:    1. Close exposure to COVID-19 virus  -     NOVEL CORONAVIRUS (COVID-19); Future        Self isolate until results return. Get shot if negative    Subjective:     Exposed on daily basis at work and commuting to son who tested positive for COVID last week. Only sx was loss of taste and smell. Patient with no sxs. Prior to Admission medications    Medication Sig Start Date End Date Taking? Authorizing Provider   losartan (COZAAR) 50 mg tablet Take 1 Tablet by mouth daily. 8/30/21  Yes Latesha Sy MD   amLODIPine (NORVASC) 10 mg tablet Take 1 Tablet by mouth daily. 7/27/21  Yes Latesha Sy MD   tenofovir DISOPROXIL FUMARATE (VIREAD) 300 mg tablet TAKE 1 TABLET BY MOUTH ONCE DAILY 5/3/21  Yes KEY Glover    Objective:   No flowsheet data found.      [INSTRUCTIONS:  \"[x]\" Indicates a positive item  \"[]\" Indicates a negative item  -- DELETE ALL ITEMS NOT EXAMINED]    Constitutional: [x] Appears well-developed and well-nourished [x] No apparent distress      [] Abnormal -     Mental status: [x] Alert and awake  [x] Oriented to person/place/time [x] Able to follow commands    [] Abnormal -     Eyes:   EOM    [x]  Normal    [] Abnormal -   Sclera  [x]  Normal    [] Abnormal -          Discharge [x]  None visible   [] Abnormal -     HENT: [x] Normocephalic, atraumatic  [] Abnormal -   [x] Mouth/Throat: Mucous membranes are moist    External Ears [x] Normal  [] Abnormal -    Neck: [x] No visualized mass [] Abnormal -     Pulmonary/Chest: [x] Respiratory effort normal   [x] No visualized signs of difficulty breathing or respiratory distress        [] Abnormal -      Musculoskeletal:   [x] Normal gait with no signs of ataxia         [x] Normal range of motion of neck        [] Abnormal -     Neurological:        [x] No Facial Asymmetry (Cranial nerve 7 motor function) (limited exam due to video visit)          [x] No gaze palsy        [] Abnormal -          Skin:        [x] No significant exanthematous lesions or discoloration noted on facial skin         [] Abnormal -            Psychiatric:       [x] Normal Affect [] Abnormal -        [x] No Hallucinations    Other pertinent observable physical exam findings:-        We discussed the expected course, resolution and complications of the diagnosis(es) in detail. Medication risks, benefits, costs, interactions, and alternatives were discussed as indicated. I advised him to contact the office if his condition worsens, changes or fails to improve as anticipated. He expressed understanding with the diagnosis(es) and plan. Santos Mckeon, was evaluated through a synchronous (real-time) audio-video encounter. The patient (or guardian if applicable) is aware that this is a billable service. Verbal consent to proceed has been obtained within the past 12 months. The visit was conducted pursuant to the emergency declaration under the [address] [place] [address] authority and the [employer] General Act. Patient identification was verified, and a caregiver was present when appropriate. The patient was located in a state where the provider was credentialed to provide care.       Paul Verma MD

## 2021-09-24 LAB
SARS-COV-2, NAA 2 DAY TAT: NORMAL
SARS-COV-2, NAA: NOT DETECTED

## 2021-10-25 DIAGNOSIS — I10 ESSENTIAL HYPERTENSION: ICD-10-CM

## 2021-10-25 RX ORDER — AMLODIPINE BESYLATE 10 MG/1
TABLET ORAL
Qty: 90 TABLET | Refills: 0 | Status: SHIPPED | OUTPATIENT
Start: 2021-10-25 | End: 2022-01-25

## 2021-11-03 DIAGNOSIS — C22.0 HEPATOCELLULAR CARCINOMA (HCC): Primary | ICD-10-CM

## 2021-11-05 ENCOUNTER — TELEPHONE (OUTPATIENT)
Dept: HEMATOLOGY | Age: 61
End: 2021-11-05

## 2021-11-05 NOTE — TELEPHONE ENCOUNTER
Patient is scheduled for CT tomorrow. Insurance has denied CT. Authorization team indicates a peer to peer must be completed to approve procedure. Tisha Dior - [phone]. Case #[id number]. Per representative, it may have to be completed by a provider and not a nurse.

## 2021-11-05 NOTE — TELEPHONE ENCOUNTER
Spoke with Physician Support Team about denials. I was told the requests were denied because records did not include a detailed history to support [facility]. I faxed clinicals to [phone] and asked for a reconsideration review. I also asked for abdominal CT CPT code to be updated to [phone] (with and without contrast). Called Kaykay and let her know we have submitted clinicals for a reconsideration review.

## 2021-11-06 ENCOUNTER — HOSPITAL ENCOUNTER (OUTPATIENT)
Dept: CT IMAGING | Age: 61
Discharge: HOME | End: 2021-11-06
Attending: PHYSICIAN ASSISTANT
Payer: COMMERCIAL

## 2021-11-06 DIAGNOSIS — C22.0 HEPATOCELLULAR CARCINOMA (HCC): ICD-10-CM

## 2021-11-06 DIAGNOSIS — B18.1 CHRONIC HEPATITIS B (HCC): ICD-10-CM

## 2021-11-06 LAB — CREAT BLD-MCNC: 1.1 MG/DL (ref 0.6–1.3)

## 2021-11-06 PROCEDURE — 82565 ASSAY OF CREATININE: CPT

## 2021-11-06 PROCEDURE — 71260 CT THORAX DX C+: CPT

## 2021-11-06 PROCEDURE — 74011000636 HC RX REV CODE- 636: Performed by: PHYSICIAN ASSISTANT

## 2021-11-06 PROCEDURE — 74170 CT ABD WO CNTRST FLWD CNTRST: CPT

## 2021-11-06 RX ADMIN — IOPAMIDOL 100 ML: 755 INJECTION, SOLUTION INTRAVENOUS at 09:32

## 2021-11-10 ENCOUNTER — OFFICE VISIT (OUTPATIENT)
Dept: HEMATOLOGY | Age: 61
End: 2021-11-10
Payer: COMMERCIAL

## 2021-11-10 VITALS
HEIGHT: 66 IN | SYSTOLIC BLOOD PRESSURE: 148 MMHG | OXYGEN SATURATION: 97 % | RESPIRATION RATE: 16 BRPM | BODY MASS INDEX: 23.4 KG/M2 | HEART RATE: 79 BPM | WEIGHT: 145.6 LBS | DIASTOLIC BLOOD PRESSURE: 78 MMHG | TEMPERATURE: 96.4 F

## 2021-11-10 DIAGNOSIS — C22.0 HEPATOCELLULAR CARCINOMA (HCC): Primary | ICD-10-CM

## 2021-11-10 DIAGNOSIS — B18.1 CHRONIC HEPATITIS B (HCC): ICD-10-CM

## 2021-11-10 PROCEDURE — 99214 OFFICE O/P EST MOD 30 MIN: CPT | Performed by: PHYSICIAN ASSISTANT

## 2021-11-10 RX ORDER — TENOFOVIR DISOPROXIL FUMARATE 300 MG/1
TABLET, FILM COATED ORAL
Qty: 90 TABLET | Refills: 3 | Status: SHIPPED | OUTPATIENT
Start: 2021-11-10

## 2021-11-10 NOTE — PROGRESS NOTES
Identified pt with two pt identifiers(name and ). Reviewed record in preparation for visit and have obtained necessary documentation. Chief Complaint   Patient presents with    Other     Hepatocellular carcinoma ([facility]   6 mo f/u      Vitals:    11/10/21 1606 11/10/21 1611   BP: (!) 150/73 (!) 148/78   Pulse: 79    Resp: 16    Temp: (!) 96.4 °F (35.8 °C)    TempSrc: Temporal    SpO2: 97%    Weight: 145 lb 9.6 oz (66 kg)    Height: 5' 6\" (1.676 m)    PainSc:   0 - No pain        Health Maintenance Review: Patient reminded of \"due or due soon\" health maintenance. I have asked the patient to contact his/her primary care provider (PCP) for follow-up on his/her health maintenance. Coordination of Care Questionnaire:  :   1) Have you been to an emergency room, urgent care, or hospitalized since your last visit? If yes, where when, and reason for visit? no       2. Have seen or consulted any other health care provider since your last visit? If yes, where when, and reason for visit? NO      Patient is accompanied by son I have received verbal consent from Junito Ortiz to discuss any/all medical information while they are present in the room.

## 2021-11-10 NOTE — PROGRESS NOTES
Shelia Alonzo MD, Anjum Alvarado MD Gracelyn Hover, MROALES Rivera, [facility]     Queta Alva, [facility]   Brendon Dietz AKBAR-ENMA Elam, [facility]       Noel Romero [facility] 136    at [facility]    [address] [facility], [address] [place] [address], [facility] 22.    [phone]    FAX: [address] [place] [address], [address]    [phone]    FAX: [phone]     Patient Care Team:  Wan Rae MD as PCP - General (Family Medicine)  Wan Rae MD as PCP - [facility] Provider  Sheyla Wilson MD (Cardiology)  Alexa Grigsby MD (General Surgery)  Sammie Garcia RN as Nurse Navigator      Patient Active Problem List   Diagnosis Code    Chronic hepatitis B ([address] B18.1    Hepatocellular carcinoma ([address] C22.0    HTN (hypertension) I10    Acute metabolic encephalopathy I11.74    Alcohol abuse F10.10    Arthritis M19.90       Bob Victoria returns to the [address] for management of [address] and chronic HBV. The active problem list, all pertinent past medical history, medications, radiologic findings and laboratory findings related to the liver disorder were reviewed with the patient. The patient is a 57 y.o.  male from [place] who was first noted to have abnormalities in liver transaminases in 3/2016. Serologic studies demonstrated he was positive for HBV and ASMA. Assessment of liver fibrosis with Fibroscan and surgical resection suggested stage 3 bridging fibrosis. He was started on Viread in 6/2016. The last HBV DNA was undetectable and he has continued to tolerate this medication well and has had no issues with ongoing access to this medication.      He developed [address] in segment 8, right lobe in 11/2016. This was treated with surgical resection in 1/2017. He has undergone regular surveillance for [address] following surgical resection. A CT scan in 2/2018 demonstrated a new 1.1 cm lesion in segment 7, right lobe not adjacent to the previous resection site. We had presented him with treatment options for recurrent HCC, he elected at that time to proceed with RFA. He was not open to transplant at that time, but relates that he might consider this as an option in the future if needed. Patient underwent uneventful RFA of the segment 7 lesion on 3/30/2018. He remained without recurrent lesion until imaging with CT in 9/2019 showed a new 3 cm left hepatic hypervascular mass, concerning for hepatoma. He has had Y-90 TARE done on 10/14/2019 and tolerated this procedure well. He is doing well now without abdominal pain, nausea or change in activity levels. Repeat imaging in 1/2020, 5/2020, 7/2020, 10/2020 4/2021 and 11/2021 have not shown recurrent or new lesions of the chest or abdomen. He presents today for review of the latest findings. He has been trying to cut back on cigarette use, but is still smoking about a 1/2 ppd and drinking a few beers \"every once in a while. \"    The patient notes that past pain in the right side over the liver since the liver resection has largely resolved at this time. This is now not an issue. The patient has not experienced fatigue. The patient completes all daily activities without any functional limitations. He continues on BP medication and monitors this value at home on a routine basis. He reports that this is not generally elevated in the morning when he checks, he has not had headache or visual changes associated with the blood pressure.      ALLERGIES  Allergies   Allergen Reactions    Venom-Honey Bee Anaphylaxis    Lisinopril Cough       MEDICATIONS  Current Outpatient Medications   Medication Sig    amLODIPine (NORVASC) 10 mg tablet TAKE 1 TABLET BY MOUTH EVERY DAY    losartan (COZAAR) 50 mg tablet Take 1 Tablet by mouth daily.  tenofovir DISOPROXIL FUMARATE (VIREAD) 300 mg tablet TAKE 1 TABLET BY MOUTH ONCE DAILY     No current facility-administered medications for this visit. SYSTEM REVIEW NOT RELATED TO LIVER DISEASE OR REVIEWED ABOVE:  Constitution systems: Negative for fever, chills, weight gain. Weight loss of ~5#. Eyes: Negative for visual changes. ENT: Negative for sore throat, painful swallowing. Respiratory: Negative for cough, hemoptysis, SOB. Cardiology: Negative for chest pain, palpitations. GI:  Negative for constipation or diarrhea. Some achy/pulling pain inferior to incision, persists - but to a much less extent. : Negative for urinary frequency, dysuria, hematuria, nocturia. Skin: Negative for rash. Diminished sensitivity of the skin overlying the right anterior thigh since surgery. Hematology: Negative for easy bruising, blood clots. Musculo-skeletal: Negative for back pain, muscle pain, weakness. Neurologic: Negative for headaches, dizziness, vertigo, memory problems not related to HE. Psychology: Negative for anxiety, depression. FAMILY HISTORY:  The father  of lung cancer. The patient has no knowledge of the mother's medical condition. There is no family history of liver disease. SOCIAL HISTORY:  The patient is . The patient has 5 children, and 7 grandchildren. The patient currently smokes ~1/2 pack of tobacco daily. He is trying to cut back on cigarettes in general.  The patient has previously consumed alcohol in excess. He now consumes alcoholic beverages on weekends, 1-2 times a month. This represents a significant reduction for him. The patient currently works full time as a .       PHYSICAL EXAMINATION:  Visit Vitals  BP (!) 148/78   Pulse 79   Temp (!) 96.4 °F (35.8 °C) (Temporal)   Resp 16   Ht 5' 6\" (1.676 m)   Wt 145 lb 9.6 oz (66 kg)   SpO2 97%   BMI 23.50 kg/m²     General: No acute distress. Eyes: Sclera anicteric. ENT: No oral lesions. Thyroid normal.  Nodes: No adenopathy. Skin: No spider angiomata. No jaundice. No palmar erythema. Respiratory: Lungs clear to auscultation. Cardiovascular: Regular heart rate. No murmurs. No JVD. Abdomen: 9-10 cm RUQ incisional scar, well-approximated without keloid or hernia. No surrounding erythema. Tender with palpation along the inferior border of the liver, which is prominent. Small, reducible umbilical hernia. Spleen not palpable. No obvious ascites. Extremities: No edema. No muscle wasting. No gross arthritic changes. Positive for significant varicose veins on the LLE. Neurologic: Alert and oriented. Cranial nerves grossly intact. No asterixis.     LABORATORY STUDIES:  Liver [place] [address] Units 7/2/2021 7/2/2021 5/3/2021   WBC 4.1 - 11.1 K/uL 5.8 7.6 6.8   ANC 1.8 - 8.0 K/UL  5.0    HGB 12.1 - 17.0 g/dL 16.6 16.0 15.3    - 400 K/uL 245 228 244   INR 0.9 - 1.1    1.0    AST 15 - 37 U/L 47 (H) 56 (H) 48 (H)   ALT 12 - 78 U/L 42 42 22   Alk Phos 45 - 117 U/L 88 83 84   Bili, Total 0.2 - 1.0 MG/DL 1.0 0.7 0.3   Bili, Direct 0.00 - 0.40 mg/dL   0.11   Albumin 3.5 - 5.0 g/dL 4.1 4.2 4.5   BUN 6 - 20 MG/DL 9 10 9   Creat 0.70 - 1.30 MG/DL 0.86 0.93 0.99   Creat (iSTAT) 0.6 - 1.3 mg/dL      Na 136 - 145 mmol/L 132 (L) 131 (L) 128 (L)   K 3.5 - 5.1 mmol/L 3.5 3.3 (L) 4.2   Cl 97 - 108 mmol/L 98 97 91 (L)   CO2 21 - 32 mmol/L 29 28 23   Glucose 65 - 100 mg/dL 89 81 99   Magnesium 1.6 - 2.4 mg/dL 2.2     Ammonia <32 UMOL/L 24       Cancer Screening Latest Ref Rng & Units 5/3/2021 12/16/2020 11/2/2020   AFP, Serum 0.0 - 8.0 ng/mL 2.5 2.4 2.5   AFP-L3% 0.0 - 9.9 % Comment Comment Comment     Virology Latest Ref Rng & Units 5/3/2021 12/16/2020   HBV DNA IU/mL HBV DNA not detected CANCELED     Virology Latest Ref Rng & Units 11/2/2020   HBV DNA IU/mL HBV DNA not detected   Additional lab values drawn at today's office visit are pending at the time of documentation. SEROLOGIES:  Serologies Latest Ref Rng 6/10/2016 4/22/2016   Hep A Ab, Total Negative  Positive (A)   Hep B Surface Ag Negative  Positive (A)   Hep B Core Ab, Total Negative  Positive (A)   Hep B Surface AB QL   Non Reactive   Hep C Ab 0.0 - 0.9 s/co ratio  0.1   Hep D AB Not Detected Not Detected    Ferritin 30 - 400 ng/mL  359   Iron % Saturation 15 - 55 %  76 (HH)   AKIN, IFA   Negative   ASMCA 0 - 19 Units  83 (H)   Alpha-1 antitrypsin level 90 - 200 mg/dL  176   6/2016. HIV Ab negative. HBeAg negative, HBeAb positive. LIVER HISTOLOGY:  6/2016. FibroScan performed at [address] EkPa was 11.8. IQR/med 26%. The results suggested a fibrosis level of F3.  1/2017. Liver resection. 2.2 cm HCC, moderately differentiated with free margins. Adjacent liver shows chronic hepatitis with bridging fibrosis (grade 2, stage 3)    ENDOSCOPIC PROCEDURES:  Not available or performed    RADIOLOGY:  3/2016. Ultrasound of liver. Normal appearing liver. Single hyperecholic mass in the right lobe measuring 1.5 cm. Consistent with hemangioma. 5/2016. CT scan abdomen with and without IV contrast. 1.9 cm hemangioma in the right hepatic lobe. No additional liver lesion. 11/2016. Ultrasound of liver. Increase in size of the right lobe hepatic mass. CT scan or MRI recommended. 11/2016. CT scan abdomen. Interval enlargement of hypervascular segment 8 liverlesion now 2.5 x 1.8 x 3.1 cm. In the setting of hepatitis B, despite lack of CT evidence of cirrhosis, hepatocellular carcinoma is more likely than an unusual presentation of a benign lesion such as atypical hemangioma. 3/2017. CT of abdomen. No residual enhancing tumor  Gas within the right lobe resection site is unexpected 2 months postoperatively.  Surgical packing material, abscess, and less likely retained postoperative gas, are possible. 4/2017. CT abdomen. No CT evidence of locally recurrent hepatocellular carcinoma, metastatic disease, or new lesions within cirrhotic appearing liver. Resolution of fluid collection. 2/2018. CT scan abdomen with and without IV contrast.  Changes consistent with chronic liver disease. New 1.1 cm enhancing liver mass with washout and rim enhancement in segment 7, right lobe. No dilated bile ducts. No ascites. 8/2018. CT abdomen. Wide ablation zone around the segment 6-7 junction HCC. No new hepatic masses. Early arterial phase limits sensitivity for small hypervascular masses. 2/2019. CT abdomen and chest.  No sign of pulmonary nodule concern. Low-attenuation ablation defect in the periphery of the right lobe of liver in segment 7 slightly increased to equivocal in size. There is no evidence of recurrent or new disease within the liver. 9/2019. CT abdomen and chest. New left hepatic hypervascular mass, 2.5 x 2.9 cm. concerning for hepatoma. Stable right middle lobe pulmonary micronodules, stable as compared to the 2016 study. 1/2020. CT abdomen. No new or recurrent liver lesions. Tiny renal hypodensities, likely cysts. 5/2020. CT abdomen/chest.  No viable tumor, no metastases. 7/2020. CT abdomen/chest.  No evidence of active/viable tumor, posttreatment changes noted. no metastases in the chest.   10/2020. CT abdomen/chest.  No new or recurrent liver lesion, no pulmonary nodules. 4/2021. CT abdomen/chest. Stable sequelae of treated lesions in segment 7 and segments 2/3 of the liver. No evidence for any residual or recurrent neoplasm. No new liver abnormality. No evidence for any metastatic disease in the chest  11/2021. CT abdomen/chest. Stable sequelae of treated lesions in segment 7 and segments 2/3 of the liver. No evidence for any residual or recurrent neoplasm. No new liver abnormality.   No evidence for any metastatic disease in the chest.    OTHER TESTIN2017. Surgical resection of [address] segment 8.  3/2018. RFA treatment to segment 7 lesion. 10/2019. Y-90 TARE. ASSESSMENT AND PLAN:  Chronic HBV with bridging fibrosis. Liver function is normal.  The platelet count is normal.    Chronic E-antigen negative HBV with undetectable HBV DNA on tenofovir. He continues to tolerate this well. He understands that this will be a life-long medication pending loss of Hep B Surface Ag. I have again checked HBSAg status today for possible loss. [address] treated with surgical resection in 2017. Recurrence of [address] in 2018. The patient had elected to treat with RFA in 3/2018. Recurrence of [address] in 2019. The patient has had TARE Y-90 theraspheres in 10/2019. No new or recurrent lesions on 2021 imaging. I have outlined with the patient and his son that he will continue to need imaging by CT scan every 6 months through 5 years. This will be scheduled prior to his next office visit. Will obtain labs today to verify stable liver function and to assess AFP and HBV suppression. I have reviewed with the patient options for treatment/transplant if recurrence were to occur. Patient would entertain transplant in the future. He is making an attempt to quit cigarettes and has largely eliminated alcohol. We could also consider use of oral anti-tumor agents as needed - he has elected to defer. Pulmonary nodules on chest CT have not changed since 2016 and none described since imaging with CT in 2021 of the chest.  Will continue to monitor. I have again stressed the importance of smoking cessation. Hypertension. Patient is presently on 40 mg lisinopril through PCP for management. He tolerates this well and home monitoring has been within normal ranges. He had a cigarette just prior to coming in for his appointment. He plans on reviewing his recordings with his PCP in the near future as well.     The patient was directed to continue all current medications at the current dosages. There are no contraindications for the patient to take any medications that are necessary for treatment of other medical issues. The patient was advised to remain abstinent from alcohol. He is continuing to drink alcohol on rare occasions. Vaccination for viral hepatitis A is not needed. The patient has serologic evidence of prior exposure or vaccination with immunity. All of the above issues were discussed with the patient. All questions were answered. The patient expressed a clear understanding of the above. [address] [place] [address] in 6 months with CT of the abdomen/chest prior to the next appointment.      Michelle Damon PA-C  Liver [place] [facility] 59, [address] [facility] 22.  [phone]  [address] [place] [address]

## 2021-11-11 LAB
AFP L3 MFR SERPL: NORMAL % (ref 0–9.9)
AFP SERPL-MCNC: 2.5 NG/ML (ref 0–8)
ALBUMIN SERPL-MCNC: 4.4 G/DL (ref 3.8–4.8)
ALP SERPL-CCNC: 82 IU/L (ref 44–121)
ALT SERPL-CCNC: 20 IU/L (ref 0–44)
AST SERPL-CCNC: 31 IU/L (ref 0–40)
BILIRUB DIRECT SERPL-MCNC: <0.1 MG/DL (ref 0–0.4)
BILIRUB SERPL-MCNC: 0.3 MG/DL (ref 0–1.2)
BUN SERPL-MCNC: 14 MG/DL (ref 8–27)
BUN/CREAT SERPL: 14 (ref 10–24)
CALCIUM SERPL-MCNC: 9.3 MG/DL (ref 8.6–10.2)
CHLORIDE SERPL-SCNC: 99 MMOL/L (ref 96–106)
CO2 SERPL-SCNC: 24 MMOL/L (ref 20–29)
CREAT SERPL-MCNC: 1.02 MG/DL (ref 0.76–1.27)
ERYTHROCYTE [DISTWIDTH] IN BLOOD BY AUTOMATED COUNT: 12.9 % (ref 11.6–15.4)
GLUCOSE SERPL-MCNC: 83 MG/DL (ref 65–99)
HBV DNA SERPL NAA+PROBE-ACNC: NORMAL IU/ML
HBV DNA SERPL NAA+PROBE-LOG IU: NORMAL LOG10 IU/ML
HBV SURFACE AB SER QL: NON REACTIVE
HBV SURFACE AG SERPL QL IA: POSITIVE
HCT VFR BLD AUTO: 41.6 % (ref 37.5–51)
HGB BLD-MCNC: 14.6 G/DL (ref 13–17.7)
INR PPP: 1 (ref 0.9–1.2)
MCH RBC QN AUTO: 34.4 PG (ref 26.6–33)
MCHC RBC AUTO-ENTMCNC: 35.1 G/DL (ref 31.5–35.7)
MCV RBC AUTO: 98 FL (ref 79–97)
PLATELET # BLD AUTO: 236 X10E3/UL (ref 150–450)
POTASSIUM SERPL-SCNC: 4.6 MMOL/L (ref 3.5–5.2)
PROT SERPL-MCNC: 7.2 G/DL (ref 6–8.5)
PROTHROMBIN TIME: 10.3 SEC (ref 9.1–12)
RBC # BLD AUTO: 4.24 X10E6/UL (ref 4.14–5.8)
REF LAB TEST REF RANGE: NORMAL
SODIUM SERPL-SCNC: 136 MMOL/L (ref 134–144)
WBC # BLD AUTO: 5.9 X10E3/UL (ref 3.4–10.8)

## 2021-11-11 NOTE — PROGRESS NOTES
Reviewed findings in detail with patient at the time of office visit. No new or concerning findings of new or recurrent liver mass/lesion. Plan repeat in 6 months.

## 2021-11-12 NOTE — PROGRESS NOTES
Pt notified of stable findings and suppressed HBV infection. Continue with medication as prescribed.

## 2021-11-16 NOTE — TELEPHONE ENCOUNTER
Received faxed notification from [facility]  that the request for CT of the abdomen with and without contrast at [facility] has been denied (Reference #: [id number]). The letter stated the service will be approved, if provided in a less-intensive setting. Called Wilma and requested a review. A new case was initiated for a facility override and the request was approved. Case #: [id number]  Authorization #: [id number]  Effective dates: 10/28/2021 - 1/26/2022    Called [employer] and left a message regarding the above. Called patient's son, Na Souza, and updated him as well.

## 2022-01-25 DIAGNOSIS — I10 ESSENTIAL HYPERTENSION: ICD-10-CM

## 2022-01-25 RX ORDER — LOSARTAN POTASSIUM 50 MG/1
50 TABLET ORAL DAILY
Qty: 90 TABLET | Refills: 1 | Status: SHIPPED | OUTPATIENT
Start: 2022-01-25 | End: 2022-08-09 | Stop reason: SDUPTHER

## 2022-03-19 PROBLEM — C22.0 HEPATOCELLULAR CARCINOMA (HCC): Status: ACTIVE | Noted: 2017-01-06

## 2022-03-19 PROBLEM — G93.41 ACUTE METABOLIC ENCEPHALOPATHY: Status: ACTIVE | Noted: 2021-07-02

## 2022-05-10 ENCOUNTER — TELEPHONE (OUTPATIENT)
Dept: HEMATOLOGY | Age: 62
End: 2022-05-10

## 2022-05-10 DIAGNOSIS — B18.1 CHRONIC HEPATITIS B (HCC): Primary | ICD-10-CM

## 2022-05-10 NOTE — TELEPHONE ENCOUNTER
Reviewed timeline of clinic appt and imaging studies with Ms. Edwin Whalen on 5/8/2022 - at her request see if patient would like to cancel 5/10 appt and reschedule after imaging and labs are completed. Called patient, his son answered, asked if his dad would like to cancel today's appt and reschedule after Ms Edwin Whalen reviews scans and labwork - son spoke to dad in [facility], patient is ok to cancel today's appt and will look forward to hearing from Ms. Edwin Whalen after imaging and labs are completed. I will reschedule his appt and send lab orders for 5/22 when imaging is completed. Son verbalized understanding.

## 2022-05-22 ENCOUNTER — HOSPITAL ENCOUNTER (OUTPATIENT)
Dept: CT IMAGING | Age: 62
Discharge: HOME | End: 2022-05-22
Attending: PHYSICIAN ASSISTANT
Payer: COMMERCIAL

## 2022-05-22 ENCOUNTER — APPOINTMENT (OUTPATIENT)
Dept: CT IMAGING | Age: 62
End: 2022-05-22
Attending: PHYSICIAN ASSISTANT
Payer: COMMERCIAL

## 2022-05-22 DIAGNOSIS — C22.0 HEPATOCELLULAR CARCINOMA (HCC): ICD-10-CM

## 2022-05-22 LAB — CREAT BLD-MCNC: 1.1 MG/DL (ref 0.6–1.3)

## 2022-05-22 PROCEDURE — 74170 CT ABD WO CNTRST FLWD CNTRST: CPT

## 2022-05-22 PROCEDURE — 82565 ASSAY OF CREATININE: CPT

## 2022-05-22 PROCEDURE — 71260 CT THORAX DX C+: CPT

## 2022-05-22 PROCEDURE — 74011000636 HC RX REV CODE- 636: Performed by: PHYSICIAN ASSISTANT

## 2022-05-22 RX ADMIN — IOPAMIDOL 100 ML: 755 INJECTION, SOLUTION INTRAVENOUS at 08:54

## 2022-05-24 DIAGNOSIS — B18.1 CHRONIC HEPATITIS B (HCC): ICD-10-CM

## 2022-05-24 DIAGNOSIS — C22.0 HEPATOCELLULAR CARCINOMA (HCC): Primary | ICD-10-CM

## 2022-05-24 LAB
ALBUMIN SERPL-MCNC: 4.4 G/DL (ref 3.8–4.8)
ALP SERPL-CCNC: 79 IU/L (ref 44–121)
ALT SERPL-CCNC: 20 IU/L (ref 0–44)
AST SERPL-CCNC: 38 IU/L (ref 0–40)
BASOPHILS # BLD AUTO: 0.1 X10E3/UL (ref 0–0.2)
BASOPHILS NFR BLD AUTO: 1 %
BILIRUB DIRECT SERPL-MCNC: 0.17 MG/DL (ref 0–0.4)
BILIRUB SERPL-MCNC: 0.5 MG/DL (ref 0–1.2)
BUN SERPL-MCNC: 13 MG/DL (ref 8–27)
BUN/CREAT SERPL: 12 (ref 10–24)
CALCIUM SERPL-MCNC: 8.8 MG/DL (ref 8.6–10.2)
CHLORIDE SERPL-SCNC: 99 MMOL/L (ref 96–106)
CO2 SERPL-SCNC: 20 MMOL/L (ref 20–29)
CREAT SERPL-MCNC: 1.13 MG/DL (ref 0.76–1.27)
EGFR: 73 ML/MIN/1.73
EOSINOPHIL # BLD AUTO: 0.1 X10E3/UL (ref 0–0.4)
EOSINOPHIL NFR BLD AUTO: 1 %
ERYTHROCYTE [DISTWIDTH] IN BLOOD BY AUTOMATED COUNT: 13.4 % (ref 11.6–15.4)
GLUCOSE SERPL-MCNC: 92 MG/DL (ref 65–99)
HBV DNA SERPL NAA+PROBE-ACNC: NORMAL IU/ML
HBV DNA SERPL NAA+PROBE-LOG IU: NORMAL LOG10 IU/ML
HCT VFR BLD AUTO: 44.8 % (ref 37.5–51)
HGB BLD-MCNC: 15.3 G/DL (ref 13–17.7)
IMM GRANULOCYTES # BLD AUTO: 0 X10E3/UL (ref 0–0.1)
IMM GRANULOCYTES NFR BLD AUTO: 0 %
INR PPP: 1 (ref 0.9–1.2)
LYMPHOCYTES # BLD AUTO: 1.9 X10E3/UL (ref 0.7–3.1)
LYMPHOCYTES NFR BLD AUTO: 26 %
MCH RBC QN AUTO: 33.4 PG (ref 26.6–33)
MCHC RBC AUTO-ENTMCNC: 34.2 G/DL (ref 31.5–35.7)
MCV RBC AUTO: 98 FL (ref 79–97)
MONOCYTES # BLD AUTO: 0.7 X10E3/UL (ref 0.1–0.9)
MONOCYTES NFR BLD AUTO: 10 %
NEUTROPHILS # BLD AUTO: 4.5 X10E3/UL (ref 1.4–7)
NEUTROPHILS NFR BLD AUTO: 62 %
PLATELET # BLD AUTO: 235 X10E3/UL (ref 150–450)
POTASSIUM SERPL-SCNC: 4.1 MMOL/L (ref 3.5–5.2)
PROT SERPL-MCNC: 7 G/DL (ref 6–8.5)
PROTHROMBIN TIME: 10.3 SEC (ref 9.1–12)
RBC # BLD AUTO: 4.58 X10E6/UL (ref 4.14–5.8)
REF LAB TEST REF RANGE: NORMAL
SODIUM SERPL-SCNC: 136 MMOL/L (ref 134–144)
WBC # BLD AUTO: 7.3 X10E3/UL (ref 3.4–10.8)

## 2022-05-24 NOTE — PROGRESS NOTES
Pt notified of stable appearance of imaging and labs. He is having no issues at present and we will plan on rescheduling follow-up and imaging in 6 months.

## 2022-05-31 ENCOUNTER — DOCUMENTATION ONLY (OUTPATIENT)
Dept: HEMATOLOGY | Age: 62
End: 2022-05-31

## 2022-06-14 ENCOUNTER — TELEPHONE (OUTPATIENT)
Dept: HEMATOLOGY | Age: 62
End: 2022-06-14

## 2022-06-17 NOTE — TELEPHONE ENCOUNTER
[email or web address] Spoke w/patient concerning his message from below. Patient states Can Nelson called him on 6/16/22 and reviewed CT results. \"  Patient had a billing issue and patient states Elke Hurt is working on a solution. Patient aware Elkemike Hurt is on vacation. (KF)

## 2022-07-29 DIAGNOSIS — I10 ESSENTIAL HYPERTENSION: ICD-10-CM

## 2022-07-31 RX ORDER — AMLODIPINE BESYLATE 10 MG/1
TABLET ORAL
Qty: 30 TABLET | Refills: 0 | Status: SHIPPED | OUTPATIENT
Start: 2022-07-31 | End: 2022-08-09 | Stop reason: SDUPTHER

## 2022-08-09 ENCOUNTER — OFFICE VISIT (OUTPATIENT)
Dept: FAMILY MEDICINE CLINIC | Age: 62
End: 2022-08-09
Payer: COMMERCIAL

## 2022-08-09 VITALS
OXYGEN SATURATION: 95 % | SYSTOLIC BLOOD PRESSURE: 144 MMHG | WEIGHT: 145 LBS | RESPIRATION RATE: 12 BRPM | DIASTOLIC BLOOD PRESSURE: 98 MMHG | TEMPERATURE: 98 F | HEART RATE: 68 BPM | HEIGHT: 66 IN | BODY MASS INDEX: 23.3 KG/M2

## 2022-08-09 DIAGNOSIS — I49.9 IRREGULAR HEART RATE: Primary | ICD-10-CM

## 2022-08-09 DIAGNOSIS — I10 ESSENTIAL HYPERTENSION: ICD-10-CM

## 2022-08-09 DIAGNOSIS — B18.1 CHRONIC HEPATITIS B (HCC): ICD-10-CM

## 2022-08-09 DIAGNOSIS — I48.91 ATRIAL FIBRILLATION, UNSPECIFIED TYPE (HCC): ICD-10-CM

## 2022-08-09 PROCEDURE — 93000 ELECTROCARDIOGRAM COMPLETE: CPT | Performed by: FAMILY MEDICINE

## 2022-08-09 PROCEDURE — 99214 OFFICE O/P EST MOD 30 MIN: CPT | Performed by: FAMILY MEDICINE

## 2022-08-09 RX ORDER — LOSARTAN POTASSIUM 50 MG/1
50 TABLET ORAL DAILY
Qty: 90 TABLET | Refills: 1 | Status: SHIPPED | OUTPATIENT
Start: 2022-08-09

## 2022-08-09 RX ORDER — AMLODIPINE BESYLATE 10 MG/1
10 TABLET ORAL DAILY
Qty: 90 TABLET | Refills: 1 | Status: SHIPPED | OUTPATIENT
Start: 2022-08-09

## 2022-08-09 NOTE — PROGRESS NOTES
Katalina Mo is a 60 y.o. male who presents to the office today with the following:  No chief complaint on file. HPI  HTN  BP at home 123-124/78 at home  140 is high  No SE with meds    Hxo fo Hep B  Had BW done 5/23 and ok  Cut back on ETOH a lot    Never had Afib in the past  Per son pt will not come for Coumadin test  And can not afford expensive meds    HM reviewed  Pt refusing vaccinations and Colon Ca screen  Had C    Review of Systems   Respiratory:  Negative for cough and shortness of breath. Cardiovascular:  Negative for chest pain, palpitations and leg swelling. See HPI. Past Medical History:   Diagnosis Date    Alcohol abuse     Arthritis     Hepatitis B carrier ([facility]     Hepatocellular carcinoma ([facility]     Hepatoma ([facility] 1/6/2017    HTN (hypertension)     Ulnar nerve compression     right       Past Surgical History:   Procedure Laterality Date    HX HEENT  1997    JAW SURGERY-A. A. HX LAP CHOLECYSTECTOMY      HX LIPOMA RESECTION  03/2021    HX ORTHOPAEDIC Right 2014    ELBOW    HX OTHER SURGICAL  01/06/2017    partial right [id number]. Dasie Settle    IR OCCL TXCATH ORGAN W SI  10/14/2019       Allergies   Allergen Reactions    Venom-Honey Bee Anaphylaxis    Lisinopril Cough       Current Outpatient Medications   Medication Sig    amLODIPine (NORVASC) 10 mg tablet Take 1 Tablet by mouth in the morning. losartan (COZAAR) 50 mg tablet Take 1 Tablet by mouth in the morning. tenofovir DISOPROXIL FUMARATE (VIREAD) 300 mg tablet TAKE 1 TABLET BY MOUTH ONCE DAILY     No current facility-administered medications for this visit.        Social History     Socioeconomic History    Marital status:    Tobacco Use    Smoking status: Every Day     Packs/day: 1.00     Years: 30.00     Pack years: 30.00     Types: Cigarettes    Smokeless tobacco: Never   Vaping Use    Vaping Use: Never used   Substance and Sexual Activity    Alcohol use: No     Alcohol/week: 0.0 standard drinks     Comment: quit 11/2016    Drug use: Never    Sexual activity: Yes     Partners: Female       Family History   Problem Relation Age of Onset    No Known Problems Mother     No Known Problems Father     No Known Problems Sister     No Known Problems Sister     Anesth Problems Neg Hx          Physical Exam:  Visit Vitals  BP (!) 144/98   Pulse 68   Temp 98 °F (36.7 °C)   Resp 12   Ht 5' 6\" (1.676 m)   Wt 145 lb (65.8 kg)   SpO2 95%   BMI 23.40 kg/m²     Physical Exam  Nursing note reviewed. Constitutional:       Appearance: He is normal weight. HENT:      Head: Normocephalic and atraumatic. Right Ear: Tympanic membrane, ear canal and external ear normal.      Left Ear: Tympanic membrane, ear canal and external ear normal.   Eyes:      Extraocular Movements: Extraocular movements intact. Conjunctiva/sclera: Conjunctivae normal.   Cardiovascular:      Rate and Rhythm: Normal rate. Rhythm irregular. Pulses: Normal pulses. Heart sounds: Normal heart sounds. Pulmonary:      Effort: Pulmonary effort is normal.      Breath sounds: Normal breath sounds. Abdominal:      General: Abdomen is flat. There is no distension. Palpations: Abdomen is soft. Tenderness: There is no abdominal tenderness. There is no right CVA tenderness, left CVA tenderness or guarding. Musculoskeletal:      Right lower leg: No edema. Left lower leg: No edema. Lymphadenopathy:      Cervical: No cervical adenopathy. Skin:     General: Skin is warm and dry. Neurological:      Mental Status: He is alert and oriented to person, place, and time. Psychiatric:         Mood and Affect: Mood normal.         Behavior: Behavior normal.     EKG shows Afib    Assessment/Plan:    ICD-10-CM ICD-9-CM    1. Irregular heart rate  I49.9 427.9 AMB POC EKG ROUTINE W/ 12 LEADS, INTER & REP      2.  Atrial fibrillation, unspecified type (HCC) [place] I48.91 427.31 REFERRAL TO CARDIOLOGY      [facility] 3RD GENERATION      CBC WITH AUTOMATED DIFF      METABOLIC PANEL, COMPREHENSIVE      LIPID PANEL      TSH 3RD GENERATION      CBC WITH AUTOMATED DIFF      METABOLIC PANEL, COMPREHENSIVE      LIPID PANEL      3. Essential hypertension  I10 401.9 amLODIPine (NORVASC) 10 mg tablet      losartan (COZAAR) 50 mg tablet      4.  Chronic hepatitis B (HCC)  B18.1 070.32 F/U with specialist        Advised to start one Aspirin a day until seen by Cardiologist      Smitha James MD

## 2022-08-09 NOTE — PROGRESS NOTES
1. \"Have you been to the ER, urgent care clinic since your last visit? Hospitalized since your last visit? \" No    2. \"Have you seen or consulted any other health care providers outside of the [address] [place] [address] since your last visit? \" No     3. For patients aged 39-70: Has the patient had a colonoscopy / FIT/ Cologuard? No      If the patient is female:    4. For patients aged 41-77: Has the patient had a mammogram within the past 2 years? NA - based on age or sex      11. For patients aged 21-65: Has the patient had a pap smear?  NA - based on age or sex

## 2022-08-11 LAB
ALBUMIN SERPL-MCNC: 4.8 G/DL (ref 3.8–4.8)
ALBUMIN/GLOB SERPL: 1.7 {RATIO} (ref 1.2–2.2)
ALP SERPL-CCNC: 91 IU/L (ref 44–121)
ALT SERPL-CCNC: 27 IU/L (ref 0–44)
AST SERPL-CCNC: 43 IU/L (ref 0–40)
BASOPHILS # BLD AUTO: 0.1 X10E3/UL (ref 0–0.2)
BASOPHILS NFR BLD AUTO: 1 %
BILIRUB SERPL-MCNC: 0.9 MG/DL (ref 0–1.2)
BUN SERPL-MCNC: 15 MG/DL (ref 8–27)
BUN/CREAT SERPL: 14 (ref 10–24)
CALCIUM SERPL-MCNC: 9.8 MG/DL (ref 8.6–10.2)
CHLORIDE SERPL-SCNC: 96 MMOL/L (ref 96–106)
CHOLEST SERPL-MCNC: 183 MG/DL (ref 100–199)
CO2 SERPL-SCNC: 23 MMOL/L (ref 20–29)
CREAT SERPL-MCNC: 1.1 MG/DL (ref 0.76–1.27)
EGFR: 75 ML/MIN/1.73
EOSINOPHIL # BLD AUTO: 0.1 X10E3/UL (ref 0–0.4)
EOSINOPHIL NFR BLD AUTO: 1 %
ERYTHROCYTE [DISTWIDTH] IN BLOOD BY AUTOMATED COUNT: 13.7 % (ref 11.6–15.4)
GLOBULIN SER CALC-MCNC: 2.9 G/DL (ref 1.5–4.5)
GLUCOSE SERPL-MCNC: 94 MG/DL (ref 65–99)
HCT VFR BLD AUTO: 46.2 % (ref 37.5–51)
HDLC SERPL-MCNC: 91 MG/DL
HGB BLD-MCNC: 16.3 G/DL (ref 13–17.7)
IMM GRANULOCYTES # BLD AUTO: 0 X10E3/UL (ref 0–0.1)
IMM GRANULOCYTES NFR BLD AUTO: 0 %
IMP & REVIEW OF LAB RESULTS: NORMAL
LDLC SERPL CALC-MCNC: 80 MG/DL (ref 0–99)
LYMPHOCYTES # BLD AUTO: 2.1 X10E3/UL (ref 0.7–3.1)
LYMPHOCYTES NFR BLD AUTO: 31 %
MCH RBC QN AUTO: 34.2 PG (ref 26.6–33)
MCHC RBC AUTO-ENTMCNC: 35.3 G/DL (ref 31.5–35.7)
MCV RBC AUTO: 97 FL (ref 79–97)
MONOCYTES # BLD AUTO: 0.7 X10E3/UL (ref 0.1–0.9)
MONOCYTES NFR BLD AUTO: 10 %
NEUTROPHILS # BLD AUTO: 4 X10E3/UL (ref 1.4–7)
NEUTROPHILS NFR BLD AUTO: 57 %
PLATELET # BLD AUTO: 242 X10E3/UL (ref 150–450)
POTASSIUM SERPL-SCNC: 5.3 MMOL/L (ref 3.5–5.2)
PROT SERPL-MCNC: 7.7 G/DL (ref 6–8.5)
RBC # BLD AUTO: 4.77 X10E6/UL (ref 4.14–5.8)
SODIUM SERPL-SCNC: 133 MMOL/L (ref 134–144)
TRIGL SERPL-MCNC: 63 MG/DL (ref 0–149)
TSH SERPL DL<=0.005 MIU/L-ACNC: 2.82 UIU/ML (ref 0.45–4.5)
VLDLC SERPL CALC-MCNC: 12 MG/DL (ref 5–40)
WBC # BLD AUTO: 7 X10E3/UL (ref 3.4–10.8)

## 2022-08-11 NOTE — PROGRESS NOTES
I have called and talked to this patient's son Robert.  I have verified the patient's name and . I have discussed the lab results and recommendations from Dr Penny Gil. Patient verbalizes understanding at this time.

## 2022-08-11 NOTE — PROGRESS NOTES
Call pt, the thyroid test is normal  The CBC is ok  The electrolytes are a touch off, we will monitor  the kidney tests are normal  The Glucose is normal  One liver test is a little up, F/U with a specialist

## 2022-09-12 ENCOUNTER — OFFICE VISIT (OUTPATIENT)
Dept: CARDIOLOGY CLINIC | Age: 62
End: 2022-09-12
Payer: COMMERCIAL

## 2022-09-12 VITALS
BODY MASS INDEX: 23.27 KG/M2 | RESPIRATION RATE: 14 BRPM | DIASTOLIC BLOOD PRESSURE: 80 MMHG | HEIGHT: 66 IN | WEIGHT: 144.8 LBS | SYSTOLIC BLOOD PRESSURE: 136 MMHG | OXYGEN SATURATION: 97 % | HEART RATE: 80 BPM

## 2022-09-12 DIAGNOSIS — I10 PRIMARY HYPERTENSION: ICD-10-CM

## 2022-09-12 DIAGNOSIS — I48.19 PERSISTENT ATRIAL FIBRILLATION (HCC): Primary | ICD-10-CM

## 2022-09-12 PROCEDURE — 99204 OFFICE O/P NEW MOD 45 MIN: CPT | Performed by: SPECIALIST

## 2022-09-12 NOTE — PROGRESS NOTES
HISTORY OF PRESENT ILLNESS  Mark Childress is a 60 y.o. male     SUMMARY:   Problem List  Date Reviewed: 9/12/2022            Codes Class Noted    HTN (hypertension) ICD-10-CM: I10  ICD-9-CM: 401.9  Unknown        Acute metabolic encephalopathy DSZ-16-FO: G93.41  ICD-9-CM: 348.31  7/2/2021        Alcohol abuse ICD-10-CM: F10.10  ICD-9-CM: 305.00  Unknown        Arthritis ICD-10-CM: M19.90  ICD-9-CM: 716.90  Unknown        Hepatocellular carcinoma ([facility] ICD-10-CM: C22.0  ICD-9-CM: 155.0  1/6/2017        Chronic hepatitis B ([facility] ICD-10-CM: B18.1  ICD-9-CM: 070.32  4/22/2016           Current Outpatient Medications on File Prior to Visit   Medication Sig    amLODIPine (NORVASC) 10 mg tablet Take 1 Tablet by mouth in the morning. losartan (COZAAR) 50 mg tablet Take 1 Tablet by mouth in the morning. tenofovir DISOPROXIL FUMARATE (VIREAD) 300 mg tablet TAKE 1 TABLET BY MOUTH ONCE DAILY     No current facility-administered medications on file prior to visit. CARDIOLOGY STUDIES TO DATE:  3/16 negative lexiscan  3/16 echo normal lvef, mild tr, sinuses 4.4cm    Chief Complaint   Patient presents with    New Patient     HPI :  He is referred by his primary care for evaluation of atrial fibrillation duration unknown. He recently saw his primary care and an irregular heartbeat was noted. EKG was obtained and I reviewed it. It showed A. fib with a controlled ventricular response but was otherwise unremarkable. He has hypertension and is a smoker. There is no history of diabetes and cholesterols been okay. Family history is negative for premature coronary disease. He works as a  plus maintains his own house and home cuts grass and so forth with no symptoms suggestive of angina or heart failure. He is completely unaware of his atrial fibrillation.   CARDIAC ROS:   negative for chest pain, dyspnea, palpitations, syncope, orthopnea, paroxysmal nocturnal dyspnea, exertional chest pressure/discomfort, claudication, lower extremity edema    Family History   Problem Relation Age of Onset    No Known Problems Mother     No Known Problems Father     No Known Problems Sister     No Known Problems Sister     Anesth Problems Neg Hx        Past Medical History:   Diagnosis Date    Alcohol abuse     Arthritis     Hepatitis B carrier ([facility]     Hepatocellular carcinoma ([facility]     Hepatoma ([facility] 1/6/2017    HTN (hypertension)     Ulnar nerve compression     right       GENERAL ROS:  A comprehensive review of systems was negative except for that written in the HPI. Visit Vitals  /80 (BP 1 Location: Right arm, BP Patient Position: Sitting, BP Cuff Size: Adult)   Pulse 80   Resp 14   Ht 5' 6\" (1.676 m)   Wt 144 lb 12.8 oz (65.7 kg)   SpO2 97%   BMI 23.37 kg/m²       Wt Readings from Last 3 Encounters:   09/12/22 144 lb 12.8 oz (65.7 kg)   08/09/22 145 lb (65.8 kg)   11/10/21 145 lb 9.6 oz (66 kg)            BP Readings from Last 3 Encounters:   09/12/22 136/80   08/09/22 (!) 144/98   11/10/21 (!) 148/78       PHYSICAL EXAM  General appearance: alert, cooperative, no distress, appears stated age  Neurologic: Alert and oriented X 3  Neck: supple, symmetrical, trachea midline, no adenopathy, no carotid bruit, and no JVD  Lungs: clear to auscultation bilaterally  Heart: irregularly irregular rhythm, S1, S2 normal, no S3 or S4  Abdomen: soft, non-tender.  Bowel sounds normal. No masses,  no organomegaly  Extremities: extremities normal, atraumatic, no cyanosis or edema, varicose veins noted, left greater than right  Pulses: 2+ and symmetric    Lab Results   Component Value Date/Time    Cholesterol, total 183 08/10/2022 12:00 AM    Cholesterol, total 150 12/16/2020 12:00 AM    HDL Cholesterol 91 08/10/2022 12:00 AM    HDL Cholesterol 78 12/16/2020 12:00 AM    LDL, calculated 80 08/10/2022 12:00 AM    LDL, calculated 61 12/16/2020 12:00 AM    Triglyceride 63 08/10/2022 12:00 AM    Triglyceride 48 12/16/2020 12:00 AM     ASSESSMENT :      Is hard to know how long he has had A. fib given his lack of symptoms and the fact that is relatively slow it could certainly missed on a routine exam.  At this point his CHADS2 score is 1 so I think a baby aspirin is adequate for him. I explained to him and his son that at age 71 his CHADS2 score would be to and that oral anticoagulants would be recommended at that time. Working to get an echocardiogram on him to make sure there is no evidence of structural or valvular heart disease. current treatment plan is effective, no change in therapy  lab results and schedule of future lab studies reviewed with patient  reviewed diet, exercise and weight control    Encounter Diagnoses   Name Primary? Persistent atrial fibrillation ([address] Yes    Primary hypertension      No orders of the defined types were placed in this encounter. Follow-up and Dispositions    Return if symptoms worsen or fail to improve. Amanda Cox MD  9/12/2022  Please note that this dictation was completed with [employer], the computer voice recognition software. Quite often unanticipated grammatical, syntax, homophones, and other interpretive errors are inadvertently transcribed by the computer software. Please disregard these errors. Please excuse any errors that have escaped final proofreading. Thank you.

## 2022-10-26 NOTE — LETTER
NOTIFICATION RETURN TO WORK / SCHOOL    9/22/2021 2:26 PM    Mr. Jazmín Simpson  [address] [facility] [address] [address] [address]      To Whom It May Concern:    Jazmín Simpson is currently under the care of [facility] Alyce Looney. He was tested for COVID and seen virtually on 9/22/2021    If there are questions or concerns please have the patient contact our office.         Sincerely,      Gopal Ni MD Family member

## 2022-11-29 DIAGNOSIS — B18.1 CHRONIC HEPATITIS B (HCC): ICD-10-CM

## 2022-11-29 DIAGNOSIS — C22.0 HEPATOCELLULAR CARCINOMA (HCC): ICD-10-CM

## 2022-11-29 RX ORDER — TENOFOVIR DISOPROXIL FUMARATE 300 MG/1
TABLET, FILM COATED ORAL
Qty: 90 TABLET | Refills: 3 | Status: SHIPPED | OUTPATIENT
Start: 2022-11-29

## 2022-11-30 ENCOUNTER — ANCILLARY PROCEDURE (OUTPATIENT)
Dept: CARDIOLOGY CLINIC | Age: 62
End: 2022-11-30
Payer: COMMERCIAL

## 2022-11-30 VITALS — HEIGHT: 60 IN | BODY MASS INDEX: 28.27 KG/M2 | WEIGHT: 144 LBS

## 2022-11-30 DIAGNOSIS — I48.19 PERSISTENT ATRIAL FIBRILLATION (HCC): ICD-10-CM

## 2022-11-30 PROCEDURE — 93306 TTE W/DOPPLER COMPLETE: CPT | Performed by: SPECIALIST

## 2022-12-01 ENCOUNTER — TELEPHONE (OUTPATIENT)
Dept: CARDIOLOGY CLINIC | Age: 62
End: 2022-12-01

## 2022-12-01 DIAGNOSIS — I48.19 PERSISTENT ATRIAL FIBRILLATION (HCC): Primary | ICD-10-CM

## 2022-12-01 DIAGNOSIS — I77.89 ENLARGED AORTA (HCC): ICD-10-CM

## 2022-12-01 LAB
ECHO AO ASC DIAM: 3.6 CM
ECHO AO ASCENDING AORTA INDEX: 2.22 CM/M2
ECHO AO ROOT DIAM: 4.4 CM
ECHO AO ROOT INDEX: 2.72 CM/M2
ECHO AV AREA PEAK VELOCITY: 3.4 CM2
ECHO AV AREA VTI: 3.5 CM2
ECHO AV AREA/BSA PEAK VELOCITY: 2.1 CM2/M2
ECHO AV AREA/BSA VTI: 2.2 CM2/M2
ECHO AV MEAN GRADIENT: 1 MMHG
ECHO AV MEAN VELOCITY: 0.5 M/S
ECHO AV PEAK GRADIENT: 3 MMHG
ECHO AV PEAK VELOCITY: 0.8 M/S
ECHO AV VELOCITY RATIO: 0.88
ECHO AV VTI: 14.6 CM
ECHO EST RA PRESSURE: 7 MMHG
ECHO LA DIAMETER INDEX: 2.16 CM/M2
ECHO LA DIAMETER: 3.5 CM
ECHO LA TO AORTIC ROOT RATIO: 0.8
ECHO LA VOL 2C: 50 ML (ref 18–58)
ECHO LA VOL 4C: 37 ML (ref 18–58)
ECHO LA VOL BP: 46 ML (ref 18–58)
ECHO LA VOL/BSA BIPLANE: 28 ML/M2 (ref 16–34)
ECHO LA VOLUME AREA LENGTH: 52 ML
ECHO LA VOLUME INDEX A2C: 31 ML/M2 (ref 16–34)
ECHO LA VOLUME INDEX A4C: 23 ML/M2 (ref 16–34)
ECHO LA VOLUME INDEX AREA LENGTH: 32 ML/M2 (ref 16–34)
ECHO LV E' LATERAL VELOCITY: 11 CM/S
ECHO LV E' SEPTAL VELOCITY: 7 CM/S
ECHO LV EDV A2C: 87 ML
ECHO LV EDV A4C: 93 ML
ECHO LV EDV BP: 91 ML (ref 67–155)
ECHO LV EDV INDEX A4C: 57 ML/M2
ECHO LV EDV INDEX BP: 56 ML/M2
ECHO LV EDV NDEX A2C: 54 ML/M2
ECHO LV EJECTION FRACTION A2C: 60 %
ECHO LV EJECTION FRACTION A4C: 57 %
ECHO LV EJECTION FRACTION BIPLANE: 58 % (ref 55–100)
ECHO LV ESV A2C: 35 ML
ECHO LV ESV A4C: 41 ML
ECHO LV ESV BP: 38 ML (ref 22–58)
ECHO LV ESV INDEX A2C: 22 ML/M2
ECHO LV ESV INDEX A4C: 25 ML/M2
ECHO LV ESV INDEX BP: 23 ML/M2
ECHO LV FRACTIONAL SHORTENING: 41 % (ref 28–44)
ECHO LV INTERNAL DIMENSION DIASTOLE INDEX: 2.72 CM/M2
ECHO LV INTERNAL DIMENSION DIASTOLIC: 4.4 CM (ref 4.2–5.9)
ECHO LV INTERNAL DIMENSION SYSTOLIC INDEX: 1.6 CM/M2
ECHO LV INTERNAL DIMENSION SYSTOLIC: 2.6 CM
ECHO LV IVSD: 1.1 CM (ref 0.6–1)
ECHO LV MASS 2D: 180 G (ref 88–224)
ECHO LV MASS INDEX 2D: 111.1 G/M2 (ref 49–115)
ECHO LV POSTERIOR WALL DIASTOLIC: 1.2 CM (ref 0.6–1)
ECHO LV RELATIVE WALL THICKNESS RATIO: 0.55
ECHO LVOT AREA: 4.2 CM2
ECHO LVOT AV VTI INDEX: 0.85
ECHO LVOT DIAM: 2.3 CM
ECHO LVOT MEAN GRADIENT: 1 MMHG
ECHO LVOT PEAK GRADIENT: 2 MMHG
ECHO LVOT PEAK VELOCITY: 0.7 M/S
ECHO LVOT STROKE VOLUME INDEX: 31.8 ML/M2
ECHO LVOT SV: 51.5 ML
ECHO LVOT VTI: 12.4 CM
ECHO RIGHT VENTRICULAR SYSTOLIC PRESSURE (RVSP): 25 MMHG
ECHO RV INTERNAL DIMENSION: 3.9 CM
ECHO RV TAPSE: 1.7 CM (ref 1.7–?)
ECHO TV REGURGITANT MAX VELOCITY: 2.11 M/S
ECHO TV REGURGITANT PEAK GRADIENT: 18 MMHG

## 2022-12-01 NOTE — TELEPHONE ENCOUNTER
Called pt. Verified patient's identity with two identifiers. Notfiied pt of results and Dr. Adelita Caba message. Scheduled echo and 1 yr follow up with Dr. Gael Blanca same day. Patient verbalized understanding and denied further questions or concerns.

## 2022-12-01 NOTE — TELEPHONE ENCOUNTER
----- Message from Sharath Hoyos MD sent at 12/1/2022 11:08 AM EST -----  Heart muscle is strong and valves all ok.  Aorta is a little enlarged, so will need fup echo in 1yr

## 2022-12-05 ENCOUNTER — TELEPHONE (OUTPATIENT)
Dept: HEMATOLOGY | Age: 62
End: 2022-12-05

## 2022-12-05 NOTE — TELEPHONE ENCOUNTER
Called patient's son and left message re: scheduling patient's imaging studies and FU appt with Ms. Sal Sesay. Left VM with request to return my call at [phone].

## 2022-12-06 ENCOUNTER — OFFICE VISIT (OUTPATIENT)
Dept: HEMATOLOGY | Age: 62
End: 2022-12-06
Payer: COMMERCIAL

## 2022-12-06 VITALS
WEIGHT: 145 LBS | HEIGHT: 60 IN | SYSTOLIC BLOOD PRESSURE: 172 MMHG | RESPIRATION RATE: 18 BRPM | DIASTOLIC BLOOD PRESSURE: 91 MMHG | BODY MASS INDEX: 28.47 KG/M2 | OXYGEN SATURATION: 96 % | TEMPERATURE: 97 F | HEART RATE: 74 BPM

## 2022-12-06 DIAGNOSIS — B18.1 CHRONIC HEPATITIS B (HCC): ICD-10-CM

## 2022-12-06 DIAGNOSIS — C22.0 HEPATOCELLULAR CARCINOMA (HCC): Primary | ICD-10-CM

## 2022-12-06 PROCEDURE — 3074F SYST BP LT 130 MM HG: CPT | Performed by: PHYSICIAN ASSISTANT

## 2022-12-06 PROCEDURE — 99214 OFFICE O/P EST MOD 30 MIN: CPT | Performed by: PHYSICIAN ASSISTANT

## 2022-12-06 PROCEDURE — 3078F DIAST BP <80 MM HG: CPT | Performed by: PHYSICIAN ASSISTANT

## 2022-12-06 NOTE — PROGRESS NOTES
Identified pt with two pt identifiers(name and ). Reviewed record in preparation for visit and have obtained necessary documentation. Chief Complaint   Patient presents with    Follow-up      Vitals:    22 1415 22 1423   BP: (!) 157/102 (!) 172/91   Pulse: 78 74   Resp: 18    Temp: 97 °F (36.1 °C)    TempSrc: Temporal    SpO2: 96%    Weight: 145 lb (65.8 kg)    Height: 5' (1.524 m)    PainSc:   0 - No pain        Health Maintenance Review: Patient reminded of \"due or due soon\" health maintenance. I have asked the patient to contact his/her primary care provider (PCP) for follow-up on his/her health maintenance. Coordination of Care Questionnaire:  :   1) Have you been to an emergency room, urgent care, or hospitalized since your last visit? If yes, where when, and reason for visit? no       2. Have seen or consulted any other health care provider since your last visit? If yes, where when, and reason for visit? YES/ check up and med refills      Patient is accompanied by son I have received verbal consent from Yessi Macedo to discuss any/all medical information while they are present in the room.

## 2022-12-06 NOTE — PROGRESS NOTES
[address] [facility], [address]ung CROCK[address]T, Cortney Hollie [place], [place]      DelphineMORALES Le, [facility]-BC   Ross Fonseca, [facility]   Wanda Denney, FNP-ENMA Guevara, FNP-C   Juan F Felder, [facility]      [address]   at [address], [facility] Donavan [place]  22.   [phone]   FAX: [phone]  Liver [facility]   at [facility]   [address], [address]   [facility], [address]   [phone]   FAX: [phone]       Patient Care Team:  Berlinda Boxer, MD as PCP - General (Family Medicine)  Berlinda Boxer, MD as PCP - [facility]  Casey Monroy MD (Cardiovascular Disease Physician)  Lopez Kidd MD (General Surgery)  Bing Champagne RN as Nurse Navigator  Payam Carey, [address] as Physician Assistant (Hepatology)      Patient Active Problem List   Diagnosis Code    Chronic hepatitis B ([address] B18.1    Hepatocellular carcinoma ([address] C22.0    HTN (hypertension) I07    Acute metabolic encephalopathy T66.20    Alcohol abuse F10.10    Arthritis M19.90       Claudeen Sciara [facility] for management of [address] and chronic HBV. The active problem list, all pertinent past medical history, medications, radiologic findings and laboratory findings related to the liver disorder were reviewed with the patient. The patient is a 60 y.o.  male from [place] who was first noted to have abnormalities in liver transaminases in 3/2016. Serologic studies demonstrated he was positive for HBV and ASMA. Assessment of liver fibrosis with Fibroscan and surgical resection suggested stage 3 bridging fibrosis. He was started on Viread in 6/2016.   The last HBV DNA was undetectable and he has continued to tolerate this medication well and has had no issues with ongoing access to this medication. He developed [address] in segment 8, right lobe in 11/2016. This was treated with surgical resection in 1/2017. He has undergone regular surveillance for [address] following surgical resection. A CT scan in 2/2018 demonstrated a new 1.1 cm lesion in segment 7, right lobe not adjacent to the previous resection site. We had presented him with treatment options for recurrent HCC, he elected at that time to proceed with RFA. He was not open to transplant at that time, but relates that he might consider this as an option in the future if needed. Patient underwent uneventful RFA of the segment 7 lesion on 3/30/2018. He remained without recurrent lesion until imaging with CT in 9/2019 showed a new 3 cm left hepatic hypervascular mass, concerning for hepatoma. He has had Y-90 TARE done on 10/14/2019 and tolerated this procedure well. He is doing well now without abdominal pain, nausea or change in activity levels. Repeat imaging in 1/2020, 5/2020, 7/2020, 10/2020 4/2021, 11/2021. And 5/2022 have not shown recurrent or new lesions of the chest or abdomen. Insurance has put limitations on interval off CT evaluation at one year. He has been trying to cut back on cigarette use, but is still smoking about a 1/2 - 1 ppd and drinking a few beers \"every once in a while. \"    The patient notes that past pain in the right side over the liver since the liver resection has largely resolved at this time. This is now not an issue. The patient has not experienced fatigue. The patient completes all daily activities without any functional limitations. He continues on BP medication and monitors this value at home on a routine basis. He reports that this is not generally elevated in the morning when he checks, he has not had headache or visual changes associated with the blood pressure.      ALLERGIES  Allergies   Allergen Reactions    Venom-Honey Bee Anaphylaxis    Lisinopril Cough MEDICATIONS  Current Outpatient Medications   Medication Sig    tenofovir DISOPROXIL FUMARATE (VIREAD) 300 mg tablet TAKE 1 TABLET BY MOUTH ONCE DAILY    amLODIPine (NORVASC) 10 mg tablet Take 1 Tablet by mouth in the morning. losartan (COZAAR) 50 mg tablet Take 1 Tablet by mouth in the morning. No current facility-administered medications for this visit. SYSTEM REVIEW NOT RELATED TO LIVER DISEASE OR REVIEWED ABOVE:  Constitution systems: Negative for fever, chills, weight gain. Weight stable. Eyes: Negative for visual changes. ENT: Negative for sore throat, painful swallowing. Respiratory: Negative for cough, hemoptysis, SOB. Cardiology: Negative for chest pain, palpitations. GI:  Negative for constipation or diarrhea. Some achy/pulling pain inferior to incision, persists - but to a much less extent. : Negative for urinary frequency, dysuria, hematuria, nocturia. Skin: Negative for rash. Diminished sensitivity of the skin overlying the right anterior thigh since surgery. Hematology: Negative for easy bruising, blood clots. Musculo-skeletal: Negative for back pain, muscle pain, weakness. Neurologic: Negative for headaches, dizziness, vertigo, memory problems not related to HE. Psychology: Negative for anxiety, depression. FAMILY HISTORY:  The father  of lung cancer. The patient has no knowledge of the mother's medical condition. There is no family history of liver disease. SOCIAL HISTORY:  The patient is . The patient has 5 children, and 7 grandchildren. The patient currently smokes ~1/2 pack of tobacco daily. He is trying to cut back on cigarettes in general.  The patient has previously consumed alcohol in excess. He now consumes alcoholic beverages on weekends, 1-2 times a month. This represents a significant reduction for him. The patient currently works full time as a .       PHYSICAL EXAMINATION:  Visit Vitals  BP (!) 172/91 (BP 1 Location: Left upper arm, BP Patient Position: Sitting, BP Cuff Size: Adult)   Pulse 74   Temp 97 °F (36.1 °C) (Temporal)   Resp 18   Ht 5' (1.524 m)   Wt 145 lb (65.8 kg)   SpO2 96%   BMI 28.32 kg/m²     General: No acute distress. Eyes: Sclera anicteric. ENT: No oral lesions. Thyroid normal.  Nodes: No adenopathy. Skin: No spider angiomata. No jaundice. No palmar erythema. Respiratory: Lungs clear to auscultation. Cardiovascular: Regular heart rate. No murmurs. No JVD. Abdomen: 9-10 cm RUQ incisional scar, well-approximated without keloid or hernia. No surrounding erythema. Tender with palpation along the inferior border of the liver, which is prominent. Small, reducible umbilical hernia. Spleen not palpable. No obvious ascites. Extremities: No edema. No muscle wasting. No gross arthritic changes. Positive for significant varicose veins on the LLE. Neurologic: Alert and oriented. Cranial nerves grossly intact. No asterixis.     LABORATORY STUDIES:  Liver [place] [address] Units 8/10/2022 5/23/2022   WBC 3.4 - 10.8 x10E3/uL 7.0 7.3   ANC 1.4 - 7.0 x10E3/uL 4.0 4.5   HGB 13.0 - 17.7 g/dL 16.3 15.3    - 450 x10E3/uL 242 235   INR 0.9 - 1.2  1.0   AST 0 - 40 IU/L 43 (H) 38   ALT 0 - 44 IU/L 27 20   Alk Phos 44 - 121 IU/L 91 79   Bili, Total 0.0 - 1.2 mg/dL 0.9 0.5   Bili, Direct 0.00 - 0.40 mg/dL  0.17   Albumin 3.8 - 4.8 g/dL 4.8 4.4   BUN 8 - 27 mg/dL 15 13   Creat 0.76 - 1.27 mg/dL 1.10 1.13   Creat (iSTAT) 0.6 - 1.3 mg/dL     Na 134 - 144 mmol/L 133 (L) 136   K 3.5 - 5.2 mmol/L 5.3 (H) 4.1   Cl 96 - 106 mmol/L 96 99   CO2 20 - 29 mmol/L 23 20   Glucose 65 - 99 mg/dL 94 92   Magnesium 1.6 - 2.4 mg/dL     Ammonia <32 UMOL/L       Cancer Screening Latest Ref Rng & Units 11/10/2021 5/3/2021 12/16/2020   AFP, Serum 0.0 - 8.0 ng/mL 2.5 2.5 2.4   AFP-L3% 0.0 - 9.9 % Comment Comment Comment     Virology Latest Ref Rng & Units 5/23/2022 11/10/2021   HBV DNA IU/mL HBV DNA not detected HBV DNA not detected     Virology Latest Ref Rng & Units 5/3/2021   HBV DNA IU/mL HBV DNA not detected   Additional lab values drawn at today's office visit are pending at the time of documentation. SEROLOGIES:  Serologies Latest Ref Rng 6/10/2016 4/22/2016   Hep A Ab, Total Negative  Positive (A)   Hep B Surface Ag Negative  Positive (A)   Hep B Core Ab, Total Negative  Positive (A)   Hep B Surface AB QL   Non Reactive   Hep C Ab 0.0 - 0.9 s/co ratio  0.1   Hep D AB Not Detected Not Detected    Ferritin 30 - 400 ng/mL  359   Iron % Saturation 15 - 55 %  76 (HH)   AKIN, IFA   Negative   ASMCA 0 - 19 Units  83 (H)   Alpha-1 antitrypsin level 90 - 200 mg/dL  176   6/2016. HIV Ab negative. HBeAg negative, HBeAb positive. LIVER HISTOLOGY:  6/2016. FibroScan performed at [address]. EkPa was 11.8. IQR/med 26%. The results suggested a fibrosis level of F3.  1/2017. Liver resection. 2.2 cm HCC, moderately differentiated with free margins. Adjacent liver shows chronic hepatitis with bridging fibrosis (grade 2, stage 3)    ENDOSCOPIC PROCEDURES:  Not available or performed    RADIOLOGY:  3/2016. Ultrasound of liver. Normal appearing liver. Single hyperecholic mass in the right lobe measuring 1.5 cm. Consistent with hemangioma. 5/2016. CT scan abdomen with and without IV contrast. 1.9 cm hemangioma in the right hepatic lobe. No additional liver lesion. 11/2016. Ultrasound of liver. Increase in size of the right lobe hepatic mass. CT scan or MRI recommended. 11/2016. CT scan abdomen. Interval enlargement of hypervascular segment 8 liverlesion now 2.5 x 1.8 x 3.1 cm. In the setting of hepatitis B, despite lack of CT evidence of cirrhosis, hepatocellular carcinoma is more likely than an unusual presentation of a benign lesion such as atypical hemangioma. 3/2017. CT of abdomen.   No residual enhancing tumor  Gas within the right lobe resection site is unexpected 2 months postoperatively. Surgical packing material, abscess, and less likely retained postoperative gas, are possible. 4/2017. CT abdomen. No CT evidence of locally recurrent hepatocellular carcinoma, metastatic disease, or new lesions within cirrhotic appearing liver. Resolution of fluid collection. 2/2018. CT scan abdomen with and without IV contrast.  Changes consistent with chronic liver disease. New 1.1 cm enhancing liver mass with washout and rim enhancement in segment 7, right lobe. No dilated bile ducts. No ascites. 8/2018. CT abdomen. Wide ablation zone around the segment 6-7 junction HCC. No new hepatic masses. Early arterial phase limits sensitivity for small hypervascular masses. 2/2019. CT abdomen and chest.  No sign of pulmonary nodule concern. Low-attenuation ablation defect in the periphery of the right lobe of liver in segment 7 slightly increased to equivocal in size. There is no evidence of recurrent or new disease within the liver. 9/2019. CT abdomen and chest. New left hepatic hypervascular mass, 2.5 x 2.9 cm. concerning for hepatoma. Stable right middle lobe pulmonary micronodules, stable as compared to the 2016 study. 1/2020. CT abdomen. No new or recurrent liver lesions. Tiny renal hypodensities, likely cysts. 5/2020. CT abdomen/chest.  No viable tumor, no metastases. 7/2020. CT abdomen/chest.  No evidence of active/viable tumor, posttreatment changes noted. no metastases in the chest.   10/2020. CT abdomen/chest.  No new or recurrent liver lesion, no pulmonary nodules. 4/2021. CT abdomen/chest. Stable sequelae of treated lesions in segment 7 and segments 2/3 of the liver. No evidence for any residual or recurrent neoplasm. No new liver abnormality. No evidence for any metastatic disease in the chest  11/2021. CT abdomen/chest. Stable sequelae of treated lesions in segment 7 and segments 2/3 of the liver. No evidence for any residual or recurrent neoplasm.  No new liver abnormality. No evidence for any metastatic disease in the chest.  2022. CT chest/abdomen. Posttreatment changes from radioembolization in the left hepatic lobe and  microwave ablation in the right lobe. No new liver lesions. No evidence of metastatic disease in the chest or abdomen. OTHER TESTIN2017. Surgical resection of [address] segment 8.  3/2018. RFA treatment to segment 7 lesion. 10/2019. Y-90 TARE. ASSESSMENT AND PLAN:  Chronic HBV with bridging fibrosis. Liver function is normal.  The platelet count is normal.    Chronic E-antigen negative HBV with undetectable HBV DNA on tenofovir. He continues to tolerate this well. He understands that this will be a life-long medication. [address] treated with surgical resection in 2017. Recurrence of [address] in 2018. The patient had elected to treat with RFA in 3/2018. Recurrence of [address] in 2019. The patient has had TARE Y-90 theraspheres in 10/2019. No new or recurrent lesions on 2022 imaging. I have outlined with the patient and his son that he will continue to need imaging by CT scan every 6 months through 5 years. His insurance company has denied this interval and is supporting once yearly scans at this point, our appeal of this decision/restriction is denied. I have discussed this at length with the patient and we will plan on repeat CT in 2023 pending review of any changes in AFP at this time. Will obtain labs today to verify stable liver function and to assess AFP and HBV suppression. Pulmonary nodules on chest CT have not changed since 2016 and none described since imaging with CT in 2022 of the chest.  Will continue to monitor. I have again stressed the importance of smoking cessation. Hypertension. Patient is presently on 40 mg lisinopril through PCP for management. He tolerates this well and home monitoring has been within normal ranges. He had a cigarette just prior to coming in for his appointment. He plans on reviewing his recordings with his PCP in the near future as well. The patient was directed to continue all current medications at the current dosages. There are no contraindications for the patient to take any medications that are necessary for treatment of other medical issues. The patient was advised to remain abstinent from alcohol. He is continuing to drink alcohol on rare occasions. Vaccination for viral hepatitis A is not needed. The patient has serologic evidence of prior exposure or vaccination with immunity. All of the above issues were discussed with the patient. All questions were answered. The patient expressed a clear understanding of the above. [address] [facility] in 6 months with CT of the abdomen/chest prior to the next appointment.      Dedrick Barragan PA-C  Liver [facility], [address]  22.  [phone]  [address]

## 2022-12-07 LAB
ALBUMIN SERPL-MCNC: 5 G/DL (ref 3.8–4.8)
ALP SERPL-CCNC: 105 IU/L (ref 44–121)
ALT SERPL-CCNC: 26 IU/L (ref 0–44)
AST SERPL-CCNC: 35 IU/L (ref 0–40)
BILIRUB DIRECT SERPL-MCNC: 0.14 MG/DL (ref 0–0.4)
BILIRUB SERPL-MCNC: 0.3 MG/DL (ref 0–1.2)
BUN SERPL-MCNC: 17 MG/DL (ref 8–27)
BUN/CREAT SERPL: 14 (ref 10–24)
CALCIUM SERPL-MCNC: 9.3 MG/DL (ref 8.6–10.2)
CHLORIDE SERPL-SCNC: 100 MMOL/L (ref 96–106)
CO2 SERPL-SCNC: 25 MMOL/L (ref 20–29)
CREAT SERPL-MCNC: 1.24 MG/DL (ref 0.76–1.27)
EGFR: 65 ML/MIN/1.73
ERYTHROCYTE [DISTWIDTH] IN BLOOD BY AUTOMATED COUNT: 12.3 % (ref 11.6–15.4)
GLUCOSE SERPL-MCNC: 93 MG/DL (ref 70–99)
HCT VFR BLD AUTO: 48 % (ref 37.5–51)
HGB BLD-MCNC: 16.7 G/DL (ref 13–17.7)
MCH RBC QN AUTO: 33.7 PG (ref 26.6–33)
MCHC RBC AUTO-ENTMCNC: 34.8 G/DL (ref 31.5–35.7)
MCV RBC AUTO: 97 FL (ref 79–97)
PLATELET # BLD AUTO: 258 X10E3/UL (ref 150–450)
POTASSIUM SERPL-SCNC: 3.9 MMOL/L (ref 3.5–5.2)
PROT SERPL-MCNC: 7.9 G/DL (ref 6–8.5)
RBC # BLD AUTO: 4.96 X10E6/UL (ref 4.14–5.8)
SODIUM SERPL-SCNC: 140 MMOL/L (ref 134–144)
WBC # BLD AUTO: 7.9 X10E3/UL (ref 3.4–10.8)

## 2022-12-08 DIAGNOSIS — C22.0 HEPATOCELLULAR CARCINOMA (HCC): Primary | ICD-10-CM

## 2022-12-08 DIAGNOSIS — B18.1 CHRONIC HEPATITIS B (HCC): ICD-10-CM

## 2022-12-08 LAB
AFP L3 MFR SERPL: NORMAL % (ref 0–9.9)
AFP SERPL-MCNC: 2.8 NG/ML (ref 0–8.4)
HBV DNA SERPL NAA+PROBE-ACNC: NORMAL IU/ML
HBV DNA SERPL NAA+PROBE-LOG IU: NORMAL LOG10 IU/ML
REF LAB TEST REF RANGE: NORMAL

## 2022-12-08 NOTE — PROGRESS NOTES
Pt notified of stable lab findings and normal tumor marker. Will follow-up in 6 months with repeat CT at that time (ordered).

## 2023-04-22 ENCOUNTER — TRANSCRIBE ORDERS (OUTPATIENT)
Facility: HOSPITAL | Age: 63
End: 2023-04-22

## 2023-04-22 DIAGNOSIS — C22.0 HEPATOCELLULAR CARCINOMA (HCC): Primary | ICD-10-CM

## 2023-04-22 DIAGNOSIS — B18.1 CHRONIC HEPATITIS B (HCC): ICD-10-CM

## 2023-05-21 RX ORDER — TENOFOVIR DISOPROXIL FUMARATE 300 MG/1
1 TABLET, FILM COATED ORAL DAILY
COMMUNITY
Start: 2022-11-29 | End: 2023-06-20

## 2023-05-21 RX ORDER — AMLODIPINE BESYLATE 10 MG/1
10 TABLET ORAL DAILY
COMMUNITY
Start: 2022-08-09

## 2023-05-21 RX ORDER — LOSARTAN POTASSIUM 50 MG/1
50 TABLET ORAL DAILY
COMMUNITY
Start: 2022-08-09

## 2023-05-24 ENCOUNTER — TELEPHONE (OUTPATIENT)
Age: 63
End: 2023-05-24

## 2023-05-24 ENCOUNTER — CLINICAL DOCUMENTATION (OUTPATIENT)
Age: 63
End: 2023-05-24

## 2023-05-24 NOTE — PROGRESS NOTES
Spoke with Christofer prior authorization, Dr Med Judge re request for CT Abd/Pelvis with and without contrast as peer to peer request for reconsideration. This was granted and she provided the following PA #:  [id number] at [facility], [facility] through 11/17/23. [address] [facility] department notified as this is scheduled for Sat 5/27/23.

## 2023-05-27 ENCOUNTER — HOSPITAL ENCOUNTER (OUTPATIENT)
Facility: HOSPITAL | Age: 63
End: 2023-05-27
Payer: COMMERCIAL

## 2023-05-27 DIAGNOSIS — C22.0 HEPATOCELLULAR CARCINOMA (HCC): ICD-10-CM

## 2023-05-27 DIAGNOSIS — B18.1 CHRONIC HEPATITIS B (HCC): ICD-10-CM

## 2023-05-27 LAB — CREAT BLD-MCNC: 1.6 MG/DL (ref 0.6–1.3)

## 2023-05-27 PROCEDURE — 82565 ASSAY OF CREATININE: CPT

## 2023-05-27 PROCEDURE — 74178 CT ABD&PLV WO CNTR FLWD CNTR: CPT

## 2023-05-27 PROCEDURE — 6360000004 HC RX CONTRAST MEDICATION: Performed by: PHYSICIAN ASSISTANT

## 2023-05-27 RX ADMIN — IOPAMIDOL 100 ML: 755 INJECTION, SOLUTION INTRAVENOUS at 09:16

## 2023-06-05 ENCOUNTER — TELEMEDICINE (OUTPATIENT)
Age: 63
End: 2023-06-05
Payer: COMMERCIAL

## 2023-06-05 DIAGNOSIS — C22.0 LIVER CELL CARCINOMA (HCC): Primary | ICD-10-CM

## 2023-06-05 DIAGNOSIS — B18.1 CHRONIC VIRAL HEPATITIS B WITHOUT DELTA AGENT AND WITHOUT COMA (HCC): ICD-10-CM

## 2023-06-05 PROCEDURE — 99214 OFFICE O/P EST MOD 30 MIN: CPT | Performed by: PHYSICIAN ASSISTANT

## 2023-06-05 ASSESSMENT — PATIENT HEALTH QUESTIONNAIRE - PHQ9
1. LITTLE INTEREST OR PLEASURE IN DOING THINGS: 0
2. FEELING DOWN, DEPRESSED OR HOPELESS: 0
SUM OF ALL RESPONSES TO PHQ QUESTIONS 1-9: 0
SUM OF ALL RESPONSES TO PHQ QUESTIONS 1-9: 0
SUM OF ALL RESPONSES TO PHQ9 QUESTIONS 1 & 2: 0
SUM OF ALL RESPONSES TO PHQ QUESTIONS 1-9: 0
SUM OF ALL RESPONSES TO PHQ QUESTIONS 1-9: 0

## 2023-06-05 NOTE — PROGRESS NOTES
Identified pt with two pt identifiers(name and ). Reviewed record in preparation for visit and have obtained necessary documentation. Chief Complaint   Patient presents with    Other     VV 6month follow up     There were no vitals taken for this visit. 1. \"Have you been to the ER, urgent care clinic since your last visit? Hospitalized since your last visit? \" No    2. \"Have you seen or consulted any other health care providers outside of the [address] [place] [address] since your last visit? \" No     Patient is accompanied by Son I have received verbal consent from Ian Gallardo to discuss any/all medical information while they are present in the room.
problem list, all pertinent past medical history, medications, radiologic findings and laboratory findings related to the liver disorder were reviewed with the patient. The patient is a 60 y.o.  male from [place] who was first noted to have abnormalities in liver transaminases in 3/2016. Serologic studies demonstrated he was positive for HBV and ASMA. Assessment of liver fibrosis with Fibroscan and surgical resection suggested stage 3 bridging fibrosis. He was started on Viread in 6/2016. The last HBV DNA was undetectable and he has continued to tolerate this medication well and has had no issues with ongoing access to this medication. He has had no side effects of this medication. He developed [address] in segment 8, right lobe in 11/2016. This was treated with surgical resection in 1/2017. He has undergone regular surveillance for [address] following surgical resection. A CT scan in 2/2018 demonstrated a new 1.1 cm lesion in segment 7, right lobe not adjacent to the previous resection site. We had presented him with treatment options for recurrent HCC, he elected at that time to proceed with RFA. He was not open to transplant at that time, but relates that he might consider this as an option in the future if needed. Patient underwent uneventful RFA of the segment 7 lesion on 3/30/2018. He remained without recurrent lesion until imaging with CT in 9/2019 showed a new 3 cm left hepatic hypervascular mass, concerning for hepatoma. He has had Y-90 TARE done on 10/14/2019 and tolerated this procedure well. He is doing well now without abdominal pain, nausea or change in activity levels. Repeat imaging in 1/2020, 5/2020, 7/2020, 10/2020 4/2021, 11/2021. And 5/2022 have not shown recurrent or new lesions of the chest or abdomen. Insurance has put limitations on interval off CT evaluation at one year.  This was recently done again in 5/2023 and he presents for discussion of these

## 2023-06-20 ENCOUNTER — CLINICAL DOCUMENTATION (OUTPATIENT)
Age: 63
End: 2023-06-20

## 2023-06-20 DIAGNOSIS — N18.32 CHRONIC RENAL IMPAIRMENT, STAGE 3B (HCC): Primary | ICD-10-CM

## 2023-06-20 DIAGNOSIS — B18.1 CHRONIC VIRAL HEPATITIS B WITHOUT DELTA AGENT AND WITHOUT COMA (HCC): Primary | ICD-10-CM

## 2023-06-20 LAB
AFP L3 MFR SERPL: NORMAL % (ref 0–9.9)
AFP SERPL-MCNC: 3 NG/ML (ref 0–8.4)

## 2023-06-20 RX ORDER — TENOFOVIR DISOPROXIL FUMARATE 300 MG/1
TABLET, FILM COATED ORAL
Qty: 1 TABLET | Refills: 0 | Status: SHIPPED | OUTPATIENT
Start: 2023-06-20

## 2023-06-20 NOTE — PROGRESS NOTES
Lab orders dated 6.20.23 mailed to patient's home, per Alayna GARCIA    Referral, last office notes, and recent lab results faxed to [facility] (at [phone]) per RADHA Miguel    Fax confirmation received.

## 2023-08-02 LAB
BUN SERPL-MCNC: 14 MG/DL (ref 8–27)
BUN/CREAT SERPL: 10 (ref 10–24)
CALCIUM SERPL-MCNC: 9.5 MG/DL (ref 8.6–10.2)
CHLORIDE SERPL-SCNC: 93 MMOL/L (ref 96–106)
CO2 SERPL-SCNC: 25 MMOL/L (ref 20–29)
CREAT SERPL-MCNC: 1.45 MG/DL (ref 0.76–1.27)
EGFRCR SERPLBLD CKD-EPI 2021: 54 ML/MIN/1.73
GLUCOSE SERPL-MCNC: 89 MG/DL (ref 70–99)
POTASSIUM SERPL-SCNC: 4.5 MMOL/L (ref 3.5–5.2)
SODIUM SERPL-SCNC: 132 MMOL/L (ref 134–144)

## 2023-12-06 ENCOUNTER — OFFICE VISIT (OUTPATIENT)
Age: 63
End: 2023-12-06
Payer: COMMERCIAL

## 2023-12-06 VITALS
HEART RATE: 98 BPM | DIASTOLIC BLOOD PRESSURE: 90 MMHG | HEIGHT: 60 IN | BODY MASS INDEX: 28.39 KG/M2 | TEMPERATURE: 97.8 F | WEIGHT: 144.6 LBS | OXYGEN SATURATION: 98 % | SYSTOLIC BLOOD PRESSURE: 147 MMHG

## 2023-12-06 DIAGNOSIS — B18.1 CHRONIC VIRAL HEPATITIS B WITHOUT DELTA AGENT AND WITHOUT COMA (HCC): ICD-10-CM

## 2023-12-06 DIAGNOSIS — Z87.898 H/O SOLITARY PULMONARY NODULE: ICD-10-CM

## 2023-12-06 DIAGNOSIS — F17.200 SMOKER: Primary | ICD-10-CM

## 2023-12-06 PROCEDURE — 3080F DIAST BP >= 90 MM HG: CPT | Performed by: PHYSICIAN ASSISTANT

## 2023-12-06 PROCEDURE — 3077F SYST BP >= 140 MM HG: CPT | Performed by: PHYSICIAN ASSISTANT

## 2023-12-06 PROCEDURE — 99214 OFFICE O/P EST MOD 30 MIN: CPT | Performed by: PHYSICIAN ASSISTANT

## 2023-12-06 RX ORDER — TENOFOVIR DISOPROXIL FUMARATE 300 MG/1
300 TABLET, FILM COATED ORAL DAILY
Qty: 30 TABLET | Refills: 5 | Status: SHIPPED | OUTPATIENT
Start: 2023-12-06

## 2023-12-06 ASSESSMENT — PATIENT HEALTH QUESTIONNAIRE - PHQ9
SUM OF ALL RESPONSES TO PHQ QUESTIONS 1-9: 0
SUM OF ALL RESPONSES TO PHQ9 QUESTIONS 1 & 2: 0
SUM OF ALL RESPONSES TO PHQ QUESTIONS 1-9: 0
2. FEELING DOWN, DEPRESSED OR HOPELESS: 0
1. LITTLE INTEREST OR PLEASURE IN DOING THINGS: 0

## 2023-12-06 NOTE — PROGRESS NOTES
MD Micheal, Gio Nguyen [place]      LASHAY Dongworth, AGPCNP-BC   Yves Hilwendy, ACNPC-AG   Scheryl Silvia, FNP-C  Ozan Andrew, FNP-C   Jose E Wylie, AGPCNP-BC      [address] [facility] [address]   at [facility]   [address] [facility], [address] [facility], [address]   [phone]   FAX: [phone]  [facility], [address] [facility], [address]   [phone]   FAX: [phone]     Patient Care Team:  Gabbi Ramos MD as PCP - General (Internal Medicine)  Bill Rowland MD as PCP - Empaneled Provider  Tatiana Justyn, [address] as Physician Assistant  Sybil Ahn RN as Care Coordinator (Hepatology)  Neeru Dos Santos MD (Nephrology)    Patient Active Problem List   Diagnosis    HTN (hypertension)    Alcohol abuse    Arthritis    Hepatocellular carcinoma ([address])    Chronic hepatitis B ([address])    Acute metabolic encephalopathy     Dena Justice returns to the [address] [place] [address],[address] of [place] for management of [address] and chronic HBV. The active problem list, all pertinent past medical history, medications, radiologic findings and laboratory findings related to the liver disorder were reviewed with the patient. The patient is a 58 y.o.  male from [facility] who was first noted to have abnormalities in liver transaminases in 3/2016. Serologic studies demonstrated he was positive for HBV and ASMA. Assessment of liver fibrosis with Fibroscan and surgical resection suggested stage 3 bridging fibrosis. He was started on Viread in 6/2016. The last HBV DNA was undetectable and he has continued to tolerate this medication well. He has had renal dose adjustment to qod and has had stabilization of renal function.   He has seen Dr Lisa Su and recent labs in late 8/2023

## 2023-12-07 LAB
ALBUMIN SERPL-MCNC: 4.4 G/DL (ref 3.9–4.9)
ALP SERPL-CCNC: 67 IU/L (ref 44–121)
ALT SERPL-CCNC: 27 IU/L (ref 0–44)
AST SERPL-CCNC: 46 IU/L (ref 0–40)
BILIRUB DIRECT SERPL-MCNC: 0.21 MG/DL (ref 0–0.4)
BILIRUB SERPL-MCNC: 0.5 MG/DL (ref 0–1.2)
BUN SERPL-MCNC: 15 MG/DL (ref 8–27)
BUN/CREAT SERPL: 11 (ref 10–24)
CALCIUM SERPL-MCNC: 9.4 MG/DL (ref 8.6–10.2)
CHLORIDE SERPL-SCNC: 96 MMOL/L (ref 96–106)
CO2 SERPL-SCNC: 22 MMOL/L (ref 20–29)
CREAT SERPL-MCNC: 1.33 MG/DL (ref 0.76–1.27)
EGFRCR SERPLBLD CKD-EPI 2021: 60 ML/MIN/1.73
ERYTHROCYTE [DISTWIDTH] IN BLOOD BY AUTOMATED COUNT: 12.3 % (ref 11.6–15.4)
GLUCOSE SERPL-MCNC: 92 MG/DL (ref 70–99)
HBV DNA SERPL NAA+PROBE-ACNC: NORMAL IU/ML
HBV DNA SERPL NAA+PROBE-LOG IU: NORMAL LOG10 IU/ML
HCT VFR BLD AUTO: 42.5 % (ref 37.5–51)
HGB BLD-MCNC: 15.1 G/DL (ref 13–17.7)
INR PPP: 1 (ref 0.9–1.2)
MCH RBC QN AUTO: 34.3 PG (ref 26.6–33)
MCHC RBC AUTO-ENTMCNC: 35.5 G/DL (ref 31.5–35.7)
MCV RBC AUTO: 97 FL (ref 79–97)
PLATELET # BLD AUTO: 209 X10E3/UL (ref 150–450)
POTASSIUM SERPL-SCNC: 4.1 MMOL/L (ref 3.5–5.2)
PROT SERPL-MCNC: 7.4 G/DL (ref 6–8.5)
PROTHROMBIN TIME: 11 SEC (ref 9.1–12)
RBC # BLD AUTO: 4.4 X10E6/UL (ref 4.14–5.8)
REF LAB TEST REF RANGE: NORMAL
SODIUM SERPL-SCNC: 136 MMOL/L (ref 134–144)
WBC # BLD AUTO: 5.2 X10E3/UL (ref 3.4–10.8)

## 2023-12-08 LAB
AFP L3 MFR SERPL: NORMAL % (ref 0–9.9)
AFP SERPL-MCNC: 2.9 NG/ML (ref 0–8.4)

## 2023-12-13 DIAGNOSIS — B18.1 CHRONIC VIRAL HEPATITIS B WITHOUT DELTA AGENT AND WITHOUT COMA (HCC): ICD-10-CM

## 2023-12-13 RX ORDER — TENOFOVIR DISOPROXIL FUMARATE 300 MG/1
300 TABLET, FILM COATED ORAL EVERY OTHER DAY
Qty: 45 TABLET | Refills: 2 | Status: ACTIVE | OUTPATIENT
Start: 2023-12-13

## 2024-03-19 NOTE — LETTER
NOTIFICATION OF RETURN TO WORK  
 
1/16/2017 3:30 PM 
 
Mr. Jose Chen 
[address] [facility] 
[address] [facility] [address] [id number] [place] Reasoner To Whom It May Concern: 
 
Jose Chen was under the care of [address] from 1-6-17 to present. He will be able to return to work on 1-17-17 with no lifting over 15 pounds for 1 week, then may resume full duty, no restrictions on 1-25-17. If there are questions or concerns please have the patient contact our office. Sincerely, Marlo Red MD/nuvia
4 = No assist / stand by assistance

## 2024-05-21 ENCOUNTER — CLINICAL DOCUMENTATION (OUTPATIENT)
Age: 64
End: 2024-05-21

## 2024-05-26 ENCOUNTER — HOSPITAL ENCOUNTER (OUTPATIENT)
Facility: HOSPITAL | Age: 64
Discharge: HOME | End: 2024-05-29
Payer: COMMERCIAL

## 2024-05-26 DIAGNOSIS — F17.200 SMOKER: ICD-10-CM

## 2024-05-26 DIAGNOSIS — Z87.898 H/O SOLITARY PULMONARY NODULE: ICD-10-CM

## 2024-05-26 DIAGNOSIS — B18.1 CHRONIC VIRAL HEPATITIS B WITHOUT DELTA AGENT AND WITHOUT COMA (HCC): ICD-10-CM

## 2024-05-26 LAB — CREAT BLD-MCNC: 1.3 MG/DL (ref 0.6–1.3)

## 2024-05-26 PROCEDURE — 6360000004 HC RX CONTRAST MEDICATION: Performed by: PHYSICIAN ASSISTANT

## 2024-05-26 PROCEDURE — 74170 CT ABD WO CNTRST FLWD CNTRST: CPT

## 2024-05-26 PROCEDURE — 71260 CT THORAX DX C+: CPT

## 2024-05-26 PROCEDURE — 82565 ASSAY OF CREATININE: CPT

## 2024-05-26 RX ADMIN — IOPAMIDOL 100 ML: 755 INJECTION, SOLUTION INTRAVENOUS at 09:07

## 2024-06-06 ENCOUNTER — OFFICE VISIT (OUTPATIENT)
Age: 64
End: 2024-06-06
Payer: COMMERCIAL

## 2024-06-06 VITALS
HEIGHT: 60 IN | OXYGEN SATURATION: 98 % | RESPIRATION RATE: 16 BRPM | WEIGHT: 141.4 LBS | HEART RATE: 85 BPM | SYSTOLIC BLOOD PRESSURE: 156 MMHG | TEMPERATURE: 98.3 F | DIASTOLIC BLOOD PRESSURE: 86 MMHG | BODY MASS INDEX: 27.76 KG/M2

## 2024-06-06 DIAGNOSIS — B18.1 CHRONIC VIRAL HEPATITIS B WITHOUT DELTA AGENT AND WITHOUT COMA (HCC): Primary | ICD-10-CM

## 2024-06-06 PROCEDURE — 3079F DIAST BP 80-89 MM HG: CPT | Performed by: PHYSICIAN ASSISTANT

## 2024-06-06 PROCEDURE — 99214 OFFICE O/P EST MOD 30 MIN: CPT | Performed by: PHYSICIAN ASSISTANT

## 2024-06-06 PROCEDURE — 3077F SYST BP >= 140 MM HG: CPT | Performed by: PHYSICIAN ASSISTANT

## 2024-06-06 PROCEDURE — 1123F ACP DISCUSS/DSCN MKR DOCD: CPT | Performed by: PHYSICIAN ASSISTANT

## 2024-06-06 RX ORDER — AMOXICILLIN AND CLAVULANATE POTASSIUM 875; 125 MG/1; MG/1
1 TABLET, FILM COATED ORAL 2 TIMES DAILY
COMMUNITY
Start: 2024-06-05

## 2024-06-06 ASSESSMENT — PATIENT HEALTH QUESTIONNAIRE - PHQ9
1. LITTLE INTEREST OR PLEASURE IN DOING THINGS: NOT AT ALL
2. FEELING DOWN, DEPRESSED OR HOPELESS: NOT AT ALL
SUM OF ALL RESPONSES TO PHQ QUESTIONS 1-9: 0
SUM OF ALL RESPONSES TO PHQ QUESTIONS 1-9: 0
SUM OF ALL RESPONSES TO PHQ9 QUESTIONS 1 & 2: 0
SUM OF ALL RESPONSES TO PHQ QUESTIONS 1-9: 0
SUM OF ALL RESPONSES TO PHQ QUESTIONS 1-9: 0

## 2024-06-06 NOTE — PROGRESS NOTES
Rm    Chief Complaint   Patient presents with    6 Month Follow-Up        BP (!) 156/86 (Site: Left Upper Arm)   Pulse 85   Temp 98.3 °F (36.8 °C)   Resp 16   Ht 1.524 m (5')   Wt 64.1 kg (141 lb 6.4 oz)   SpO2 98%   BMI 27.62 kg/m²      1. Have you been to the ER, urgent care clinic since your last visit?  Hospitalized since your last visit? [facility] 6[facility]24 infection on leg.    2. Have you seen or consulted any other health care providers outside of the [facility] since your last visit?  Include any pap smears or colon screening.  No     Health Maintenance Due   Topic Date Due    COVID-19 Vaccine (1) Never done    Pneumococcal 65+ years Vaccine (1 of 2 - PCV) Never done    Hepatitis C screen  Never done    Hepatitis A vaccine (1 of 2 - Risk 2-dose series) Never done    DTaP/Tdap/Td vaccine (1 - Tdap) Never done    Colorectal Cancer Screen  Never done    Shingles vaccine (1 of 2) Never done    Hepatitis B vaccine (1 of 3 - Risk 3-dose series) Never done    Respiratory Syncytial Virus (RSV) Pregnant or age 60 yrs+ (1 - 1-dose 60+ series) Never done             No data to display                 Failed to redirect to the Timeline version of the [employer].    Failed to redirect to the Timeline version of the [employer].             
episode.     LOC. Unclear as to cause.  I have asked him to follow-up with PCP for further evaluations and have obtained A1c on his labs as glucose at the time of the accident was markedly elevated.  I have also drawn serum ammonia on labs today. He has not had HE in the past but son has noted some increased confusion. I have given general instructions to increase elimination of stool with Miralax to address any relationship to ammonia. He will also follow-up with cardiology as above.     The patient was directed to continue all current medications at the current dosages. There are no contraindications for the patient to take any medications that are necessary for treatment of other medical issues.    The patient was advised to remain abstinent from alcohol. He is continuing to drink alcohol on rare occasions.     Vaccination for viral hepatitis A is not needed. The patient has serologic evidence of prior exposure or vaccination with immunity.    FOLLOW-UP:  All of the above issues were discussed with the patient.  All questions were answered.  The patient expressed a clear understanding of the above.    Follow-up [facility] in 6 months with repeat US at that time. Labs today for monitoring of AFP, liver/renal function and HBV.     Phyllis Brooks PA-C  [facility]  [address] [place] [address], [address]  [place]  [address] [phone]  [facility]

## 2024-06-07 LAB
AFP L3 MFR SERPL: NORMAL % (ref 0–9.9)
AFP SERPL-MCNC: 2.5 NG/ML (ref 0–8.4)
ALBUMIN SERPL-MCNC: 4.8 G/DL (ref 3.9–4.9)
ALP SERPL-CCNC: 94 IU/L (ref 44–121)
ALT SERPL-CCNC: 37 IU/L (ref 0–44)
AMMONIA PLAS-MCNC: 76 UG/DL (ref 40–200)
AST SERPL-CCNC: 60 IU/L (ref 0–40)
BILIRUB DIRECT SERPL-MCNC: 0.4 MG/DL (ref 0–0.4)
BILIRUB SERPL-MCNC: 0.9 MG/DL (ref 0–1.2)
BUN SERPL-MCNC: 12 MG/DL (ref 8–27)
BUN/CREAT SERPL: 9 (ref 10–24)
CALCIUM SERPL-MCNC: 9.7 MG/DL (ref 8.6–10.2)
CHLORIDE SERPL-SCNC: 96 MMOL/L (ref 96–106)
CO2 SERPL-SCNC: 24 MMOL/L (ref 20–29)
CREAT SERPL-MCNC: 1.33 MG/DL (ref 0.76–1.27)
EGFRCR SERPLBLD CKD-EPI 2021: 59 ML/MIN/1.73
ERYTHROCYTE [DISTWIDTH] IN BLOOD BY AUTOMATED COUNT: 13.2 % (ref 11.6–15.4)
GLUCOSE SERPL-MCNC: 102 MG/DL (ref 70–99)
HBA1C MFR BLD: 6.1 % (ref 4.8–5.6)
HCT VFR BLD AUTO: 46.3 % (ref 37.5–51)
HGB BLD-MCNC: 16.1 G/DL (ref 13–17.7)
INR PPP: 1 (ref 0.9–1.2)
MCH RBC QN AUTO: 33.9 PG (ref 26.6–33)
MCHC RBC AUTO-ENTMCNC: 34.8 G/DL (ref 31.5–35.7)
MCV RBC AUTO: 98 FL (ref 79–97)
PLATELET # BLD AUTO: 220 X10E3/UL (ref 150–450)
POTASSIUM SERPL-SCNC: 4.7 MMOL/L (ref 3.5–5.2)
PROT SERPL-MCNC: 7.5 G/DL (ref 6–8.5)
PROTHROMBIN TIME: 10.8 SEC (ref 9.1–12)
RBC # BLD AUTO: 4.75 X10E6/UL (ref 4.14–5.8)
SODIUM SERPL-SCNC: 133 MMOL/L (ref 134–144)
WBC # BLD AUTO: 6 X10E3/UL (ref 3.4–10.8)

## 2024-06-10 ENCOUNTER — HOSPITAL ENCOUNTER (EMERGENCY)
Facility: HOSPITAL | Age: 64
Discharge: HOME | End: 2024-06-10
Attending: EMERGENCY MEDICINE
Payer: COMMERCIAL

## 2024-06-10 VITALS
TEMPERATURE: 98.3 F | BODY MASS INDEX: 22.14 KG/M2 | DIASTOLIC BLOOD PRESSURE: 88 MMHG | OXYGEN SATURATION: 98 % | RESPIRATION RATE: 18 BRPM | HEIGHT: 66 IN | HEART RATE: 94 BPM | WEIGHT: 137.79 LBS | SYSTOLIC BLOOD PRESSURE: 169 MMHG

## 2024-06-10 DIAGNOSIS — I83.892 BLEEDING FROM VARICOSE VEINS OF LEFT LOWER EXTREMITY: ICD-10-CM

## 2024-06-10 DIAGNOSIS — S81.812A LACERATION OF LEFT LOWER LEG, INITIAL ENCOUNTER: Primary | ICD-10-CM

## 2024-06-10 LAB
ALBUMIN SERPL-MCNC: 4 G/DL (ref 3.5–5)
ALBUMIN/GLOB SERPL: 0.9 (ref 1.1–2.2)
ALP SERPL-CCNC: 97 U/L (ref 45–117)
ALT SERPL-CCNC: 47 U/L (ref 12–78)
ANION GAP SERPL CALC-SCNC: 8 MMOL/L (ref 5–15)
APTT PPP: 27.5 SEC (ref 22.1–31)
AST SERPL-CCNC: 60 U/L (ref 15–37)
BASOPHILS # BLD: 0.1 K/UL (ref 0–0.1)
BASOPHILS NFR BLD: 1 % (ref 0–1)
BILIRUB SERPL-MCNC: 1.3 MG/DL (ref 0.2–1)
BUN SERPL-MCNC: 9 MG/DL (ref 6–20)
BUN/CREAT SERPL: 7 (ref 12–20)
CALCIUM SERPL-MCNC: 9.8 MG/DL (ref 8.5–10.1)
CHLORIDE SERPL-SCNC: 101 MMOL/L (ref 97–108)
CO2 SERPL-SCNC: 28 MMOL/L (ref 21–32)
CREAT SERPL-MCNC: 1.23 MG/DL (ref 0.7–1.3)
DIFFERENTIAL METHOD BLD: ABNORMAL
EOSINOPHIL # BLD: 0 K/UL (ref 0–0.4)
EOSINOPHIL NFR BLD: 1 % (ref 0–7)
ERYTHROCYTE [DISTWIDTH] IN BLOOD BY AUTOMATED COUNT: 13.4 % (ref 11.5–14.5)
GLOBULIN SER CALC-MCNC: 4.4 G/DL (ref 2–4)
GLUCOSE SERPL-MCNC: 105 MG/DL (ref 65–100)
HCT VFR BLD AUTO: 47.1 % (ref 36.6–50.3)
HGB BLD-MCNC: 16 G/DL (ref 12.1–17)
IMM GRANULOCYTES # BLD AUTO: 0 K/UL (ref 0–0.04)
IMM GRANULOCYTES NFR BLD AUTO: 0 % (ref 0–0.5)
INR PPP: 1 (ref 0.9–1.1)
LYMPHOCYTES # BLD: 1.4 K/UL (ref 0.8–3.5)
LYMPHOCYTES NFR BLD: 21 % (ref 12–49)
MCH RBC QN AUTO: 33.8 PG (ref 26–34)
MCHC RBC AUTO-ENTMCNC: 34 G/DL (ref 30–36.5)
MCV RBC AUTO: 99.6 FL (ref 80–99)
MONOCYTES # BLD: 0.7 K/UL (ref 0–1)
MONOCYTES NFR BLD: 11 % (ref 5–13)
NEUTS SEG # BLD: 4.4 K/UL (ref 1.8–8)
NEUTS SEG NFR BLD: 66 % (ref 32–75)
NRBC # BLD: 0 K/UL (ref 0–0.01)
NRBC BLD-RTO: 0 PER 100 WBC
PLATELET # BLD AUTO: 228 K/UL (ref 150–400)
PMV BLD AUTO: 9.2 FL (ref 8.9–12.9)
POTASSIUM SERPL-SCNC: 4 MMOL/L (ref 3.5–5.1)
PROT SERPL-MCNC: 8.4 G/DL (ref 6.4–8.2)
PROTHROMBIN TIME: 10.6 SEC (ref 9–11.1)
RBC # BLD AUTO: 4.73 M/UL (ref 4.1–5.7)
SODIUM SERPL-SCNC: 137 MMOL/L (ref 136–145)
THERAPEUTIC RANGE: NORMAL SECS (ref 58–77)
WBC # BLD AUTO: 6.7 K/UL (ref 4.1–11.1)

## 2024-06-10 PROCEDURE — 85610 PROTHROMBIN TIME: CPT

## 2024-06-10 PROCEDURE — 85730 THROMBOPLASTIN TIME PARTIAL: CPT

## 2024-06-10 PROCEDURE — 80053 COMPREHEN METABOLIC PANEL: CPT

## 2024-06-10 PROCEDURE — 99283 EMERGENCY DEPT VISIT LOW MDM: CPT

## 2024-06-10 PROCEDURE — 12001 RPR S/N/AX/GEN/TRNK 2.5CM/<: CPT

## 2024-06-10 PROCEDURE — 85025 COMPLETE CBC W/AUTO DIFF WBC: CPT

## 2024-06-10 PROCEDURE — 36415 COLL VENOUS BLD VENIPUNCTURE: CPT

## 2024-06-10 ASSESSMENT — PAIN SCALES - GENERAL: PAINLEVEL_OUTOF10: 0

## 2024-06-11 NOTE — ED PROVIDER NOTES
EMERGENCY DEPARTMENT HISTORY AND PHYSICAL EXAM    Date: 6/10/2024  Patient Name: Mike Gr  Patient Age and Sex: 64 y.o. male  MRN:  [id number]  CSN:  [id number]    History of Present Illness     Chief Complaint   Patient presents with    Bleeding/Bruising     Ambulatory to triage reporting bleeding from a cut on a varicose vein that happened around 1100 this morning when he scratched his L calf on a piece of sharp metal. Arrives with bleeding controlled. Reports recent tetanus vaccination.       History Provided By: Patient    Ability to gather history was limited by:     HPI: Mike Gr, 64 y.o. male   Presenting with mild bleeding from his left lower leg in the setting of an approximately 2 cm superficial laceration.  He has extensive varicose veins and seems to be oozing blood continuously from a varicose vein.  The initial minor injury occurred about 8 hours ago.      Tobacco Use      Smoking status: Every Day        Packs/day: 1.00        Types: Cigarettes      Smokeless tobacco: Never     Past History   The patient's medical, surgical, and social history were reviewed by me today.    Current Medications:  No current facility-administered medications on file prior to encounter.     Current Outpatient Medications on File Prior to Encounter   Medication Sig Dispense Refill    amoxicillin-clavulanate (AUGMENTIN) 875-125 MG per tablet Take 1 tablet by mouth 2 times daily      tenofovir disoproxil fumarate (VIREAD) 300 MG tablet Take 1 tablet by mouth every other day 45 tablet 2    amLODIPine (NORVASC) 10 MG tablet Take 1 tablet by mouth daily      losartan (COZAAR) 50 MG tablet Take 1 tablet by mouth daily         Past Medical History:   Diagnosis Date    Alcohol abuse     Arthritis     Hepatitis B carrier (HCC)     Hepatocellular carcinoma (HCC)     Hepatoma (HCC) 1/6/2017    HTN (hypertension)     Ulnar nerve compression     right       Past Surgical History:   Procedure Laterality Date    CHOLECYSTECTOMY,

## 2024-11-25 DIAGNOSIS — B18.1 CHRONIC VIRAL HEPATITIS B WITHOUT DELTA AGENT AND WITHOUT COMA (HCC): ICD-10-CM

## 2024-11-25 RX ORDER — TENOFOVIR DISOPROXIL FUMARATE 300 MG/1
300 TABLET, FILM COATED ORAL EVERY OTHER DAY
Qty: 45 TABLET | Refills: 2 | Status: ACTIVE | OUTPATIENT
Start: 2024-11-25

## 2024-12-09 ENCOUNTER — OFFICE VISIT (OUTPATIENT)
Age: 64
End: 2024-12-09
Payer: COMMERCIAL

## 2024-12-09 VITALS
WEIGHT: 151.8 LBS | HEIGHT: 66 IN | DIASTOLIC BLOOD PRESSURE: 94 MMHG | BODY MASS INDEX: 24.4 KG/M2 | SYSTOLIC BLOOD PRESSURE: 165 MMHG | TEMPERATURE: 99.4 F | OXYGEN SATURATION: 95 % | HEART RATE: 105 BPM

## 2024-12-09 DIAGNOSIS — R79.89 ELEVATED FERRITIN LEVEL: ICD-10-CM

## 2024-12-09 DIAGNOSIS — B18.1 CHRONIC VIRAL HEPATITIS B WITHOUT DELTA AGENT AND WITHOUT COMA (HCC): ICD-10-CM

## 2024-12-09 DIAGNOSIS — C22.0 LIVER CELL CARCINOMA (HCC): Primary | ICD-10-CM

## 2024-12-09 PROCEDURE — 1123F ACP DISCUSS/DSCN MKR DOCD: CPT | Performed by: PHYSICIAN ASSISTANT

## 2024-12-09 PROCEDURE — 99214 OFFICE O/P EST MOD 30 MIN: CPT | Performed by: PHYSICIAN ASSISTANT

## 2024-12-09 PROCEDURE — 3077F SYST BP >= 140 MM HG: CPT | Performed by: PHYSICIAN ASSISTANT

## 2024-12-09 PROCEDURE — 3080F DIAST BP >= 90 MM HG: CPT | Performed by: PHYSICIAN ASSISTANT

## 2024-12-09 RX ORDER — TENOFOVIR DISOPROXIL FUMARATE 300 MG/1
300 TABLET, FILM COATED ORAL EVERY OTHER DAY
Qty: 30 TABLET | Refills: 5 | Status: ACTIVE | OUTPATIENT
Start: 2024-12-09

## 2024-12-09 ASSESSMENT — PATIENT HEALTH QUESTIONNAIRE - PHQ9
1. LITTLE INTEREST OR PLEASURE IN DOING THINGS: NOT AT ALL
SUM OF ALL RESPONSES TO PHQ QUESTIONS 1-9: 0
DEPRESSION UNABLE TO ASSESS: FUNCTIONAL CAPACITY MOTIVATION LIMITS ACCURACY
SUM OF ALL RESPONSES TO PHQ QUESTIONS 1-9: 0
2. FEELING DOWN, DEPRESSED OR HOPELESS: NOT AT ALL
SUM OF ALL RESPONSES TO PHQ9 QUESTIONS 1 & 2: 0

## 2024-12-09 ASSESSMENT — ANXIETY QUESTIONNAIRES
GAD7 TOTAL SCORE: 0
7. FEELING AFRAID AS IF SOMETHING AWFUL MIGHT HAPPEN: NOT AT ALL
1. FEELING NERVOUS, ANXIOUS, OR ON EDGE: NOT AT ALL
4. TROUBLE RELAXING: NOT AT ALL
6. BECOMING EASILY ANNOYED OR IRRITABLE: NOT AT ALL
IF YOU CHECKED OFF ANY PROBLEMS ON THIS QUESTIONNAIRE, HOW DIFFICULT HAVE THESE PROBLEMS MADE IT FOR YOU TO DO YOUR WORK, TAKE CARE OF THINGS AT HOME, OR GET ALONG WITH OTHER PEOPLE: NOT DIFFICULT AT ALL
3. WORRYING TOO MUCH ABOUT DIFFERENT THINGS: NOT AT ALL
5. BEING SO RESTLESS THAT IT IS HARD TO SIT STILL: NOT AT ALL
2. NOT BEING ABLE TO STOP OR CONTROL WORRYING: NOT AT ALL

## 2024-12-09 NOTE — PROGRESS NOTES
Chief Complaint   Patient presents with    Follow-up     N/A     Vitals:    12/09/24 1459   BP: (!) 165/94   Site: Left Upper Arm   Position: Sitting   Pulse: (!) 105   Temp: 99.4 °F (37.4 °C)   TempSrc: Temporal   SpO2: 95%   Weight: 68.9 kg (151 lb 12.8 oz)   Height: 1.676 m (5' 6\")     .  \"Have you been to the ER, urgent care clinic since your last visit?  Hospitalized since your last visit?\"    NO    “Have you seen or consulted any other health care providers outside of [facility] since your last visit?”    NO        “Have you had a colorectal cancer screening such as a colonoscopy/FIT/Cologuard?    NO    No colonoscopy on file  No cologuard on file  No FIT/FOBT on file   No flexible sigmoidoscopy on file         Click Here for Release of Records Request    
no pulmonary nodules.   2021.  CT abdomen/chest. Stable sequelae of treated lesions in segment 7 and segments 2/3 of the liver. No evidence for any residual or recurrent neoplasm. No new liver abnormality.  No evidence for any metastatic disease in the chest  2021.  CT abdomen/chest. Stable sequelae of treated lesions in segment 7 and segments 2/3 of the liver. No evidence for any residual or recurrent neoplasm. No new liver abnormality.  No evidence for any metastatic disease in the chest.  2022.  CT chest/abdomen.  Posttreatment changes from radioembolization in the left hepatic lobe and  microwave ablation in the right lobe. No new liver lesions.  No evidence of metastatic disease in the chest or abdomen.  2023.  CT abdomen with and without contrast. Posttreatment changes from radioembolization in the left hepatic lobe and microwave ablation in the right lobe with no CT evidence of locally recurrent disease and no new liver lesions.  2024. CT chest/abdomen. Status post left lobe radioembolization with expected atrophic changes.  Status post segment 7 liver ablation without evidence of local recurrence.   No evidence of new hepatocellular carcinoma.  Clear lungs without suspicious lung nodule.   No lymphadenopathy in the chest or abdomen. No ascites.     OTHER TESTIN2017.  Surgical resection of HCC, segment 8.  3/2018.  RFA treatment to segment 7 lesion.  10/2019.  Y-90 TARE.    ASSESSMENT AND PLAN:  Chronic HBV with bridging fibrosis.     Liver function is normal.  The platelet count is normal.    Chronic E-antigen negative HBV with undetectable HBV DNA on tenofovir. He continues to tolerate this well. He understands that this will be a life-long medication due to advanced fibrosis and reduction in HCC with suppressive therapy.     HCC treated with surgical resection in 2017.  Recurrence of HCC in 2018.  The patient had elected to treat with RFA in 3/2018.   Recurrence of HCC in 2019.  The

## 2024-12-10 LAB
ALBUMIN SERPL-MCNC: 4.4 G/DL (ref 3.9–4.9)
ALP SERPL-CCNC: 82 IU/L (ref 44–121)
ALT SERPL-CCNC: 22 IU/L (ref 0–44)
AST SERPL-CCNC: 46 IU/L (ref 0–40)
BILIRUB DIRECT SERPL-MCNC: 0.21 MG/DL (ref 0–0.4)
BILIRUB SERPL-MCNC: 0.5 MG/DL (ref 0–1.2)
BUN SERPL-MCNC: 16 MG/DL (ref 8–27)
BUN/CREAT SERPL: 14 (ref 10–24)
CALCIUM SERPL-MCNC: 9.1 MG/DL (ref 8.6–10.2)
CHLORIDE SERPL-SCNC: 98 MMOL/L (ref 96–106)
CO2 SERPL-SCNC: 20 MMOL/L (ref 20–29)
CREAT SERPL-MCNC: 1.17 MG/DL (ref 0.76–1.27)
EGFRCR SERPLBLD CKD-EPI 2021: 69 ML/MIN/1.73
ERYTHROCYTE [DISTWIDTH] IN BLOOD BY AUTOMATED COUNT: 12.8 % (ref 11.6–15.4)
FERRITIN SERPL-MCNC: 101 NG/ML (ref 30–400)
GLUCOSE SERPL-MCNC: 101 MG/DL (ref 70–99)
HCT VFR BLD AUTO: 44.9 % (ref 37.5–51)
HGB BLD-MCNC: 15.5 G/DL (ref 13–17.7)
INR PPP: 1 (ref 0.9–1.2)
IRON SATN MFR SERPL: 52 % (ref 15–55)
IRON SERPL-MCNC: 196 UG/DL (ref 38–169)
MCH RBC QN AUTO: 34.6 PG (ref 26.6–33)
MCHC RBC AUTO-ENTMCNC: 34.5 G/DL (ref 31.5–35.7)
MCV RBC AUTO: 100 FL (ref 79–97)
PLATELET # BLD AUTO: 249 X10E3/UL (ref 150–450)
POTASSIUM SERPL-SCNC: 4.1 MMOL/L (ref 3.5–5.2)
PROT SERPL-MCNC: 7.4 G/DL (ref 6–8.5)
PROTHROMBIN TIME: 11.7 SEC (ref 9.1–12)
RBC # BLD AUTO: 4.48 X10E6/UL (ref 4.14–5.8)
SODIUM SERPL-SCNC: 136 MMOL/L (ref 134–144)
TIBC SERPL-MCNC: 374 UG/DL (ref 250–450)
UIBC SERPL-MCNC: 178 UG/DL (ref 111–343)
WBC # BLD AUTO: 7.9 X10E3/UL (ref 3.4–10.8)

## 2024-12-11 LAB
AFP L3 MFR SERPL: NORMAL % (ref 0–9.9)
AFP SERPL-MCNC: 3.1 NG/ML (ref 0–8.4)
HBV DNA SERPL NAA+PROBE-ACNC: NORMAL IU/ML
HBV DNA SERPL NAA+PROBE-LOG IU: NORMAL LOG10 IU/ML
REF LAB TEST REF RANGE: NORMAL

## 2024-12-17 LAB
HFE GENE MUT ANL BLD/T: NORMAL
IMP & REVIEW OF LAB RESULTS: NORMAL

## 2024-12-18 ENCOUNTER — TELEPHONE (OUTPATIENT)
Age: 64
End: 2024-12-18

## 2024-12-18 DIAGNOSIS — B18.1 CHRONIC VIRAL HEPATITIS B WITHOUT DELTA AGENT AND WITHOUT COMA (HCC): ICD-10-CM

## 2024-12-18 RX ORDER — TENOFOVIR DISOPROXIL FUMARATE 300 MG/1
300 TABLET, FILM COATED ORAL EVERY OTHER DAY
Qty: 45 TABLET | Refills: 3 | Status: SHIPPED | OUTPATIENT
Start: 2024-12-18

## 2025-01-11 ENCOUNTER — HOSPITAL ENCOUNTER (OUTPATIENT)
Facility: HOSPITAL | Age: 65
Discharge: HOME | End: 2025-01-14
Payer: COMMERCIAL

## 2025-01-11 DIAGNOSIS — B18.1 CHRONIC VIRAL HEPATITIS B WITHOUT DELTA AGENT AND WITHOUT COMA (HCC): ICD-10-CM

## 2025-01-11 PROCEDURE — 76700 US EXAM ABDOM COMPLETE: CPT

## 2025-01-13 DIAGNOSIS — B18.1 CHRONIC VIRAL HEPATITIS B WITHOUT DELTA AGENT AND WITHOUT COMA (HCC): Primary | ICD-10-CM

## 2025-01-13 DIAGNOSIS — C22.0 LIVER CELL CARCINOMA (HCC): ICD-10-CM

## 2025-01-14 ENCOUNTER — CLINICAL DOCUMENTATION (OUTPATIENT)
Age: 65
End: 2025-01-14

## 2025-08-06 ENCOUNTER — OFFICE VISIT (OUTPATIENT)
Age: 65
End: 2025-08-06
Payer: COMMERCIAL

## 2025-08-06 VITALS
HEIGHT: 66 IN | SYSTOLIC BLOOD PRESSURE: 180 MMHG | BODY MASS INDEX: 23.46 KG/M2 | WEIGHT: 146 LBS | DIASTOLIC BLOOD PRESSURE: 97 MMHG | RESPIRATION RATE: 16 BRPM | HEART RATE: 82 BPM

## 2025-08-06 DIAGNOSIS — C22.0 LIVER CELL CARCINOMA (HCC): ICD-10-CM

## 2025-08-06 DIAGNOSIS — B18.1 CHRONIC VIRAL HEPATITIS B WITHOUT DELTA AGENT AND WITHOUT COMA (HCC): Primary | ICD-10-CM

## 2025-08-06 PROCEDURE — 99214 OFFICE O/P EST MOD 30 MIN: CPT | Performed by: PHYSICIAN ASSISTANT

## 2025-08-06 PROCEDURE — 3080F DIAST BP >= 90 MM HG: CPT | Performed by: PHYSICIAN ASSISTANT

## 2025-08-06 PROCEDURE — 3077F SYST BP >= 140 MM HG: CPT | Performed by: PHYSICIAN ASSISTANT

## 2025-08-06 PROCEDURE — 1123F ACP DISCUSS/DSCN MKR DOCD: CPT | Performed by: PHYSICIAN ASSISTANT

## 2025-08-06 RX ORDER — TENOFOVIR DISOPROXIL FUMARATE 300 MG/1
300 TABLET, FILM COATED ORAL EVERY OTHER DAY
Qty: 45 TABLET | Refills: 3 | Status: ACTIVE | OUTPATIENT
Start: 2025-08-06

## 2025-08-07 LAB
ALBUMIN SERPL-MCNC: 4.5 G/DL (ref 3.9–4.9)
ALP SERPL-CCNC: 72 IU/L (ref 44–121)
ALT SERPL-CCNC: 21 IU/L (ref 0–44)
AST SERPL-CCNC: 28 IU/L (ref 0–40)
BILIRUB DIRECT SERPL-MCNC: 0.14 MG/DL (ref 0–0.4)
BILIRUB SERPL-MCNC: 0.3 MG/DL (ref 0–1.2)
BUN SERPL-MCNC: 15 MG/DL (ref 8–27)
BUN/CREAT SERPL: 11 (ref 10–24)
CALCIUM SERPL-MCNC: 9.7 MG/DL (ref 8.6–10.2)
CHLORIDE SERPL-SCNC: 102 MMOL/L (ref 96–106)
CO2 SERPL-SCNC: 25 MMOL/L (ref 20–29)
CREAT SERPL-MCNC: 1.41 MG/DL (ref 0.76–1.27)
EGFRCR SERPLBLD CKD-EPI 2021: 55 ML/MIN/1.73
ERYTHROCYTE [DISTWIDTH] IN BLOOD BY AUTOMATED COUNT: 13.1 % (ref 11.6–15.4)
GLUCOSE SERPL-MCNC: 92 MG/DL (ref 70–99)
HCT VFR BLD AUTO: 46.8 % (ref 37.5–51)
HGB BLD-MCNC: 15.7 G/DL (ref 13–17.7)
INR PPP: 1 (ref 0.9–1.2)
MCH RBC QN AUTO: 34.4 PG (ref 26.6–33)
MCHC RBC AUTO-ENTMCNC: 33.5 G/DL (ref 31.5–35.7)
MCV RBC AUTO: 103 FL (ref 79–97)
PLATELET # BLD AUTO: 265 X10E3/UL (ref 150–450)
POTASSIUM SERPL-SCNC: 4.7 MMOL/L (ref 3.5–5.2)
PROT SERPL-MCNC: 7.4 G/DL (ref 6–8.5)
PROTHROMBIN TIME: 10.7 SEC (ref 9.1–12)
RBC # BLD AUTO: 4.56 X10E6/UL (ref 4.14–5.8)
SODIUM SERPL-SCNC: 139 MMOL/L (ref 134–144)
WBC # BLD AUTO: 6.9 X10E3/UL (ref 3.4–10.8)

## 2025-08-09 LAB
AFP L3 MFR SERPL: NORMAL % (ref 0–9.9)
AFP SERPL-MCNC: 2.1 NG/ML (ref 0–8.4)

## (undated) DEVICE — SUTURE VCRL SZ 3-0 L27IN ABSRB UD L26MM SH 1/2 CIR J416H

## (undated) DEVICE — SYR 10ML LUER LOK 1/5ML GRAD --

## (undated) DEVICE — PACK,LAPAROTOMY,2 REINFORCED GOWNS: Brand: MEDLINE

## (undated) DEVICE — BLADE ASSEMB CLP HAIR FINE --

## (undated) DEVICE — SUTURE VCRL SZ 2-0 L27IN ABSRB UD L26MM SH 1/2 CIR J417H

## (undated) DEVICE — SURGICAL PROCEDURE PACK BASIN MAJ SET CUST NO CAUT

## (undated) DEVICE — PENCIL SMK EVAC 10 FT BLADE ELECTRD ROCKER FOR TELSCP

## (undated) DEVICE — GARMENT,MEDLINE,DVT,INT,CALF,MED, GEN2: Brand: MEDLINE

## (undated) DEVICE — SOL IRR SOD CL 0.9% 1000ML BTL --

## (undated) DEVICE — DERMABOND SKIN ADH 0.7ML -- DERMABOND ADVANCED 12/BX

## (undated) DEVICE — DRAPE,UTILTY,TAPE,15X26, 4EA/PK: Brand: MEDLINE

## (undated) DEVICE — REM POLYHESIVE ADULT PATIENT RETURN ELECTRODE: Brand: VALLEYLAB

## (undated) DEVICE — STERILE POLYISOPRENE POWDER-FREE SURGICAL GLOVES WITH EMOLLIENT COATING: Brand: PROTEXIS

## (undated) DEVICE — SUTURE PERMAHAND SZ 2-0 L18IN NONABSORBABLE BLK L26MM PS 1588H

## (undated) DEVICE — SUTURE MCRYL SZ 4-0 L27IN ABSRB UD L19MM PS-2 1/2 CIR PRIM Y426H

## (undated) DEVICE — PREP SKN CHLRAPRP APL 26ML STR --

## (undated) DEVICE — TOWEL SURG W17XL27IN STD BLU COT NONFENESTRATED PREWASHED

## (undated) DEVICE — STRAP,POSITIONING,KNEE/BODY,FOAM,4X60": Brand: MEDLINE

## (undated) DEVICE — INFECTION CONTROL KIT SYS

## (undated) DEVICE — SPONGE GZ W4XL4IN COT 12 PLY TYP VII WVN C FLD DSGN